# Patient Record
Sex: MALE | Race: WHITE | NOT HISPANIC OR LATINO
[De-identification: names, ages, dates, MRNs, and addresses within clinical notes are randomized per-mention and may not be internally consistent; named-entity substitution may affect disease eponyms.]

---

## 2017-01-04 ENCOUNTER — APPOINTMENT (OUTPATIENT)
Dept: CARDIOLOGY | Facility: CLINIC | Age: 82
End: 2017-01-04

## 2017-01-16 ENCOUNTER — APPOINTMENT (OUTPATIENT)
Dept: CARDIOLOGY | Facility: CLINIC | Age: 82
End: 2017-01-16

## 2017-02-05 ENCOUNTER — TRANSCRIPTION ENCOUNTER (OUTPATIENT)
Age: 82
End: 2017-02-05

## 2017-02-06 ENCOUNTER — APPOINTMENT (OUTPATIENT)
Dept: CARDIOLOGY | Facility: CLINIC | Age: 82
End: 2017-02-06

## 2017-02-07 ENCOUNTER — APPOINTMENT (OUTPATIENT)
Dept: CARDIOLOGY | Facility: CLINIC | Age: 82
End: 2017-02-07

## 2017-03-07 ENCOUNTER — RX RENEWAL (OUTPATIENT)
Age: 82
End: 2017-03-07

## 2017-03-13 ENCOUNTER — APPOINTMENT (OUTPATIENT)
Dept: CARDIOLOGY | Facility: CLINIC | Age: 82
End: 2017-03-13

## 2017-03-20 ENCOUNTER — NON-APPOINTMENT (OUTPATIENT)
Age: 82
End: 2017-03-20

## 2017-03-20 ENCOUNTER — APPOINTMENT (OUTPATIENT)
Dept: CARDIOLOGY | Facility: CLINIC | Age: 82
End: 2017-03-20

## 2017-03-20 VITALS
SYSTOLIC BLOOD PRESSURE: 122 MMHG | HEIGHT: 68 IN | HEART RATE: 65 BPM | OXYGEN SATURATION: 98 % | WEIGHT: 165 LBS | BODY MASS INDEX: 25.01 KG/M2 | DIASTOLIC BLOOD PRESSURE: 76 MMHG | RESPIRATION RATE: 17 BRPM

## 2017-03-20 DIAGNOSIS — I49.3 VENTRICULAR PREMATURE DEPOLARIZATION: ICD-10-CM

## 2017-03-22 ENCOUNTER — APPOINTMENT (OUTPATIENT)
Dept: ELECTROPHYSIOLOGY | Facility: CLINIC | Age: 82
End: 2017-03-22

## 2017-03-22 VITALS
SYSTOLIC BLOOD PRESSURE: 121 MMHG | BODY MASS INDEX: 25.01 KG/M2 | WEIGHT: 165 LBS | HEIGHT: 68 IN | DIASTOLIC BLOOD PRESSURE: 68 MMHG

## 2017-03-27 ENCOUNTER — TRANSCRIPTION ENCOUNTER (OUTPATIENT)
Age: 82
End: 2017-03-27

## 2017-03-27 ENCOUNTER — OUTPATIENT (OUTPATIENT)
Dept: INPATIENT UNIT | Facility: HOSPITAL | Age: 82
LOS: 1 days | Discharge: HOME | End: 2017-03-27
Payer: MEDICARE

## 2017-03-27 VITALS
SYSTOLIC BLOOD PRESSURE: 147 MMHG | HEIGHT: 67 IN | RESPIRATION RATE: 16 BRPM | WEIGHT: 162.04 LBS | TEMPERATURE: 98 F | OXYGEN SATURATION: 96 % | HEART RATE: 67 BPM | DIASTOLIC BLOOD PRESSURE: 74 MMHG

## 2017-03-27 VITALS
HEART RATE: 60 BPM | DIASTOLIC BLOOD PRESSURE: 73 MMHG | RESPIRATION RATE: 17 BRPM | SYSTOLIC BLOOD PRESSURE: 123 MMHG | OXYGEN SATURATION: 96 %

## 2017-03-27 DIAGNOSIS — I44.2 ATRIOVENTRICULAR BLOCK, COMPLETE: ICD-10-CM

## 2017-03-27 DIAGNOSIS — Z45.018 ENCOUNTER FOR ADJUSTMENT AND MANAGEMENT OF OTHER PART OF CARDIAC PACEMAKER: ICD-10-CM

## 2017-03-27 DIAGNOSIS — Z98.890 OTHER SPECIFIED POSTPROCEDURAL STATES: Chronic | ICD-10-CM

## 2017-03-27 DIAGNOSIS — Z95.0 PRESENCE OF CARDIAC PACEMAKER: Chronic | ICD-10-CM

## 2017-03-27 LAB
ALBUMIN SERPL ELPH-MCNC: 4.2 G/DL — SIGNIFICANT CHANGE UP (ref 3.3–5)
ALP SERPL-CCNC: 58 U/L — SIGNIFICANT CHANGE UP (ref 40–120)
ALT FLD-CCNC: 27 U/L RC — SIGNIFICANT CHANGE UP (ref 10–45)
ANION GAP SERPL CALC-SCNC: 13 MMOL/L — SIGNIFICANT CHANGE UP (ref 5–17)
AST SERPL-CCNC: 25 U/L — SIGNIFICANT CHANGE UP (ref 10–40)
BILIRUB SERPL-MCNC: 0.7 MG/DL — SIGNIFICANT CHANGE UP (ref 0.2–1.2)
BUN SERPL-MCNC: 19 MG/DL — SIGNIFICANT CHANGE UP (ref 7–23)
CALCIUM SERPL-MCNC: 9.1 MG/DL — SIGNIFICANT CHANGE UP (ref 8.4–10.5)
CHLORIDE SERPL-SCNC: 103 MMOL/L — SIGNIFICANT CHANGE UP (ref 96–108)
CO2 SERPL-SCNC: 26 MMOL/L — SIGNIFICANT CHANGE UP (ref 22–31)
CREAT SERPL-MCNC: 1.08 MG/DL — SIGNIFICANT CHANGE UP (ref 0.5–1.3)
GLUCOSE SERPL-MCNC: 110 MG/DL — HIGH (ref 70–99)
HCT VFR BLD CALC: 45.7 % — SIGNIFICANT CHANGE UP (ref 39–50)
HGB BLD-MCNC: 15.7 G/DL — SIGNIFICANT CHANGE UP (ref 13–17)
MCHC RBC-ENTMCNC: 32.3 PG — SIGNIFICANT CHANGE UP (ref 27–34)
MCHC RBC-ENTMCNC: 34.4 GM/DL — SIGNIFICANT CHANGE UP (ref 32–36)
MCV RBC AUTO: 93.8 FL — SIGNIFICANT CHANGE UP (ref 80–100)
PLATELET # BLD AUTO: 113 K/UL — LOW (ref 150–400)
POTASSIUM SERPL-MCNC: 4.6 MMOL/L — SIGNIFICANT CHANGE UP (ref 3.5–5.3)
POTASSIUM SERPL-SCNC: 4.6 MMOL/L — SIGNIFICANT CHANGE UP (ref 3.5–5.3)
PROT SERPL-MCNC: 6.7 G/DL — SIGNIFICANT CHANGE UP (ref 6–8.3)
RBC # BLD: 4.88 M/UL — SIGNIFICANT CHANGE UP (ref 4.2–5.8)
RBC # FLD: 12.1 % — SIGNIFICANT CHANGE UP (ref 10.3–14.5)
SODIUM SERPL-SCNC: 142 MMOL/L — SIGNIFICANT CHANGE UP (ref 135–145)
WBC # BLD: 7.5 K/UL — SIGNIFICANT CHANGE UP (ref 3.8–10.5)
WBC # FLD AUTO: 7.5 K/UL — SIGNIFICANT CHANGE UP (ref 3.8–10.5)

## 2017-03-27 PROCEDURE — 85027 COMPLETE CBC AUTOMATED: CPT

## 2017-03-27 PROCEDURE — 71010: CPT | Mod: 26

## 2017-03-27 PROCEDURE — 93005 ELECTROCARDIOGRAM TRACING: CPT

## 2017-03-27 PROCEDURE — 33228 REMV&REPLC PM GEN DUAL LEAD: CPT

## 2017-03-27 PROCEDURE — 93010 ELECTROCARDIOGRAM REPORT: CPT

## 2017-03-27 PROCEDURE — 80053 COMPREHEN METABOLIC PANEL: CPT

## 2017-03-27 PROCEDURE — C1785: CPT

## 2017-03-27 PROCEDURE — 71045 X-RAY EXAM CHEST 1 VIEW: CPT

## 2017-03-27 RX ORDER — ASPIRIN/CALCIUM CARB/MAGNESIUM 324 MG
81 TABLET ORAL DAILY
Qty: 0 | Refills: 0 | Status: DISCONTINUED | OUTPATIENT
Start: 2017-03-27 | End: 2017-03-27

## 2017-03-27 RX ORDER — CEFAZOLIN SODIUM 1 G
1000 VIAL (EA) INJECTION ONCE
Qty: 0 | Refills: 0 | Status: DISCONTINUED | OUTPATIENT
Start: 2017-03-27 | End: 2017-03-27

## 2017-03-27 RX ORDER — CEPHALEXIN 500 MG
250 CAPSULE ORAL THREE TIMES A DAY
Qty: 0 | Refills: 0 | Status: DISCONTINUED | OUTPATIENT
Start: 2017-03-28 | End: 2017-03-27

## 2017-03-27 RX ORDER — CEFAZOLIN SODIUM 1 G
2000 VIAL (EA) INJECTION ONCE
Qty: 0 | Refills: 0 | Status: COMPLETED | OUTPATIENT
Start: 2017-03-27 | End: 2017-03-27

## 2017-03-27 RX ORDER — LISINOPRIL 2.5 MG/1
20 TABLET ORAL DAILY
Qty: 0 | Refills: 0 | Status: DISCONTINUED | OUTPATIENT
Start: 2017-03-27 | End: 2017-03-27

## 2017-03-27 RX ORDER — SODIUM CHLORIDE 9 MG/ML
1000 INJECTION, SOLUTION INTRAVENOUS
Qty: 0 | Refills: 0 | Status: DISCONTINUED | OUTPATIENT
Start: 2017-03-27 | End: 2017-03-27

## 2017-03-27 RX ORDER — METOPROLOL TARTRATE 50 MG
25 TABLET ORAL DAILY
Qty: 0 | Refills: 0 | Status: DISCONTINUED | OUTPATIENT
Start: 2017-03-27 | End: 2017-03-27

## 2017-03-27 RX ORDER — INSULIN LISPRO 100/ML
VIAL (ML) SUBCUTANEOUS
Qty: 0 | Refills: 0 | Status: DISCONTINUED | OUTPATIENT
Start: 2017-03-27 | End: 2017-03-27

## 2017-03-27 RX ORDER — SIMVASTATIN 20 MG/1
40 TABLET, FILM COATED ORAL AT BEDTIME
Qty: 0 | Refills: 0 | Status: DISCONTINUED | OUTPATIENT
Start: 2017-03-27 | End: 2017-03-27

## 2017-03-27 RX ORDER — GLUCAGON INJECTION, SOLUTION 0.5 MG/.1ML
1 INJECTION, SOLUTION SUBCUTANEOUS ONCE
Qty: 0 | Refills: 0 | Status: DISCONTINUED | OUTPATIENT
Start: 2017-03-27 | End: 2017-03-27

## 2017-03-27 RX ORDER — INSULIN LISPRO 100/ML
VIAL (ML) SUBCUTANEOUS AT BEDTIME
Qty: 0 | Refills: 0 | Status: DISCONTINUED | OUTPATIENT
Start: 2017-03-27 | End: 2017-03-27

## 2017-03-27 RX ORDER — DEXTROSE 50 % IN WATER 50 %
25 SYRINGE (ML) INTRAVENOUS ONCE
Qty: 0 | Refills: 0 | Status: DISCONTINUED | OUTPATIENT
Start: 2017-03-27 | End: 2017-03-27

## 2017-03-27 RX ORDER — LEVOTHYROXINE SODIUM 125 MCG
50 TABLET ORAL DAILY
Qty: 0 | Refills: 0 | Status: DISCONTINUED | OUTPATIENT
Start: 2017-03-27 | End: 2017-03-27

## 2017-03-27 RX ORDER — DEXTROSE 50 % IN WATER 50 %
1 SYRINGE (ML) INTRAVENOUS ONCE
Qty: 0 | Refills: 0 | Status: DISCONTINUED | OUTPATIENT
Start: 2017-03-27 | End: 2017-03-27

## 2017-03-27 RX ORDER — DEXTROSE 50 % IN WATER 50 %
12.5 SYRINGE (ML) INTRAVENOUS ONCE
Qty: 0 | Refills: 0 | Status: DISCONTINUED | OUTPATIENT
Start: 2017-03-27 | End: 2017-03-27

## 2017-03-27 RX ADMIN — SIMVASTATIN 40 MILLIGRAM(S): 20 TABLET, FILM COATED ORAL at 21:53

## 2017-03-27 RX ADMIN — Medication 100 MILLIGRAM(S): at 21:08

## 2017-03-27 NOTE — DISCHARGE NOTE ADULT - MEDICATION SUMMARY - MEDICATIONS TO TAKE
I will START or STAY ON the medications listed below when I get home from the hospital:    Ecotrin Adult Low Strength 81 mg oral delayed release tablet  -- 1 tab(s) by mouth once a day  -- Indication: For cad    ramipril 5 mg oral capsule  -- 1 cap(s) by mouth once a day  -- Indication: For High blood pressure    metFORMIN 500 mg oral tablet, extended release  -- 1 tab(s) by mouth once a day  -- Indication: For Diabetes    simvastatin 40 mg oral tablet  -- 1 tab(s) by mouth once a day (at bedtime)  -- Indication: For High cholesterol    metoprolol succinate 25 mg oral tablet, extended release  -- 1 tab(s) by mouth once a day  -- Indication: For High blood pressure    cephalexin 250 mg oral tablet  -- 1 tab(s) by mouth every 8 hours  -- Finish all this medication unless otherwise directed by prescriber.    -- Indication: For chest wall wound_ s/p PPM generator change    Synthroid 50 mcg (0.05 mg) oral tablet  -- 1 tab(s) by mouth once a day  -- Indication: For Hypothyroid

## 2017-03-27 NOTE — DISCHARGE NOTE ADULT - CARE PLAN
Principal Discharge DX:	Artificial pacemaker  Goal:	s/p PPM generator change  Instructions for follow-up, activity and diet:	- please follow all discharge instruction as given by EP nurse Practitioner  please cont antibiotics as prescribed- finish all doses  - please follow up for WOUND CHECK assessment with MD  Secondary Diagnosis:	DM (diabetes mellitus)  Goal:	Your hemoglobin A1C will be at a normal range (normal range is from 6-8)  Instructions for follow-up, activity and diet:	Continue to follow with your primary care MD or your endocrinologist. Discuss what the goal hemoglobin A1C level is for you.  Follow a heart healthy diabetic diet. If you check your fingerstick glucose at home, call your MD if it is greater than 250mg/dL on 2 occasions or less than 100mg/dL on 2 occasions. Know signs of low blood sugar, such as: dizziness, shakiness, sweating, confusion, hunger, nervousness- drink 4 ounces apple juice if occurs and call your doctor. Know early signs of high blood sugar, such as: frequent urination, increased thirst, blurry vision, fatigue, headache - call your doctor if this occurs.  Secondary Diagnosis:	HLD (hyperlipidemia)  Goal:	Your LDL cholesterol will be less than 70mg/dL  Instructions for follow-up, activity and diet:	Continue with your cholesterol medications. Eat a heart healthy diet that is low in saturated fats and salt, and includes whole grains, fruits, vegetables and lean protein; exercise regularly (consult with your physician or cardiologist first); maintain a heart healthy weight; if you smoke - quit (A resource to help you stop smoking is the [facility] – phone number [phone].). Continue to follow with your primary physician or cardiologist.  Secondary Diagnosis:	HTN (hypertension)  Goal:	Your blood pressure will be controlled.  Instructions for follow-up, activity and diet:	Continue with your blood pressure medications; eat a heart healthy diet with low salt diet; exercise regularly (consult with your physician or cardiologist first); maintain a heart healthy weight; if you smoke - quit (A resource to help you stop smoking is the [facility] [employer] – phone number [phone].); include healthy ways to manage stress. Continue to follow with your primary care physician or cardiologist. Principal Discharge DX:	Artificial pacemaker  Goal:	s/p PPM generator change  Instructions for follow-up, activity and diet:	- please follow all discharge instruction as given by EP nurse Practitioner  please cont antibiotics as prescribed- finish all doses  - please follow up for WOUND CHECK assessment with MD  Secondary Diagnosis:	DM (diabetes mellitus)  Goal:	Your hemoglobin A1C will be at a normal range (normal range is from 6-8)  Instructions for follow-up, activity and diet:	Continue to follow with your primary care MD or your endocrinologist. Discuss what the goal hemoglobin A1C level is for you.  Follow a heart healthy diabetic diet. If you check your fingerstick glucose at home, call your MD if it is greater than 250mg/dL on 2 occasions or less than 100mg/dL on 2 occasions. Know signs of low blood sugar, such as: dizziness, shakiness, sweating, confusion, hunger, nervousness- drink 4 ounces apple juice if occurs and call your doctor. Know early signs of high blood sugar, such as: frequent urination, increased thirst, blurry vision, fatigue, headache - call your doctor if this occurs.  Secondary Diagnosis:	HLD (hyperlipidemia)  Goal:	Your LDL cholesterol will be less than 70mg/dL  Instructions for follow-up, activity and diet:	Continue with your cholesterol medications. Eat a heart healthy diet that is low in saturated fats and salt, and includes whole grains, fruits, vegetables and lean protein; exercise regularly (consult with your physician or cardiologist first); maintain a heart healthy weight; if you smoke - quit (A resource to help you stop smoking is the [facility] [employer] – phone number [phone].). Continue to follow with your primary physician or cardiologist.  Secondary Diagnosis:	HTN (hypertension)  Goal:	Your blood pressure will be controlled.  Instructions for follow-up, activity and diet:	Continue with your blood pressure medications; eat a heart healthy diet with low salt diet; exercise regularly (consult with your physician or cardiologist first); maintain a heart healthy weight; if you smoke - quit (A resource to help you stop smoking is the [facility] [employer] [facility] – phone number [phone].); include healthy ways to manage stress. Continue to follow with your primary care physician or cardiologist.

## 2017-03-27 NOTE — DISCHARGE NOTE ADULT - NS AS ACTIVITY OBS
Walking-Outdoors allowed/Walking-Indoors allowed/No Heavy lifting/straining/activity as per EP nurse practitioner please

## 2017-03-27 NOTE — DISCHARGE NOTE ADULT - CARE PROVIDER_API CALL
Zacarias Pool (MD), Cardiac Electrophysiology; Cardiology; Internal Medicine  [address] [place] [address]  Phone: [phone]  Fax: [phone]

## 2017-03-27 NOTE — DISCHARGE NOTE ADULT - PATIENT PORTAL LINK FT
“You can access the [facility] Patient Portal, offered by [facility], by registering with the following website: [email or web address]”

## 2017-03-27 NOTE — DISCHARGE NOTE ADULT - PLAN OF CARE
s/p PPM generator change - please follow all discharge instruction as given by EP nurse Practitioner  please cont antibiotics as prescribed- finish all doses  - please follow up for WOUND CHECK assessment with MD Your hemoglobin A1C will be at a normal range (normal range is from 6-8) Continue to follow with your primary care MD or your endocrinologist. Discuss what the goal hemoglobin A1C level is for you.  Follow a heart healthy diabetic diet. If you check your fingerstick glucose at home, call your MD if it is greater than 250mg/dL on 2 occasions or less than 100mg/dL on 2 occasions. Know signs of low blood sugar, such as: dizziness, shakiness, sweating, confusion, hunger, nervousness- drink 4 ounces apple juice if occurs and call your doctor. Know early signs of high blood sugar, such as: frequent urination, increased thirst, blurry vision, fatigue, headache - call your doctor if this occurs. Your LDL cholesterol will be less than 70mg/dL Continue with your cholesterol medications. Eat a heart healthy diet that is low in saturated fats and salt, and includes whole grains, fruits, vegetables and lean protein; exercise regularly (consult with your physician or cardiologist first); maintain a heart healthy weight; if you smoke - quit (A resource to help you stop smoking is the [facility] – phone number [phone].). Continue to follow with your primary physician or cardiologist. Your blood pressure will be controlled. Continue with your blood pressure medications; eat a heart healthy diet with low salt diet; exercise regularly (consult with your physician or cardiologist first); maintain a heart healthy weight; if you smoke - quit (A resource to help you stop smoking is the [facility] – phone number [phone].); include healthy ways to manage stress. Continue to follow with your primary care physician or cardiologist.

## 2017-03-27 NOTE — DISCHARGE NOTE ADULT - CARE PROVIDERS DIRECT ADDRESSES
[email or web address]

## 2017-03-27 NOTE — H&P CARDIOLOGY - HISTORY OF PRESENT ILLNESS
81 y/o male with PMHx of PPM, DM, type 2, HLD, HTN, Hypothyroidism presents for PPM generator change. Patient denies any chest pain, SOB, palpitation, dizziness/ syncope. Seen & evaluated by cardiologist & recommends for generator change.

## 2017-03-27 NOTE — DISCHARGE NOTE ADULT - ADDITIONAL INSTRUCTIONS
follow up for wound check- appointment given by EP nurse practitioner   follow up with your PCP and cards in 1-2 weeks

## 2017-03-28 RX ORDER — CEPHALEXIN 500 MG
1 CAPSULE ORAL
Qty: 9 | Refills: 0 | OUTPATIENT
Start: 2017-03-28 | End: 2017-03-31

## 2017-04-05 ENCOUNTER — NON-APPOINTMENT (OUTPATIENT)
Age: 82
End: 2017-04-05

## 2017-04-05 ENCOUNTER — APPOINTMENT (OUTPATIENT)
Dept: ELECTROPHYSIOLOGY | Facility: CLINIC | Age: 82
End: 2017-04-05

## 2017-04-05 VITALS
DIASTOLIC BLOOD PRESSURE: 76 MMHG | HEIGHT: 68 IN | SYSTOLIC BLOOD PRESSURE: 130 MMHG | OXYGEN SATURATION: 96 % | HEART RATE: 60 BPM

## 2017-05-01 ENCOUNTER — APPOINTMENT (OUTPATIENT)
Dept: CARDIOLOGY | Facility: CLINIC | Age: 82
End: 2017-05-01

## 2017-05-08 ENCOUNTER — RX RENEWAL (OUTPATIENT)
Age: 82
End: 2017-05-08

## 2017-07-24 ENCOUNTER — NON-APPOINTMENT (OUTPATIENT)
Age: 82
End: 2017-07-24

## 2017-07-24 ENCOUNTER — APPOINTMENT (OUTPATIENT)
Dept: CARDIOLOGY | Facility: CLINIC | Age: 82
End: 2017-07-24

## 2017-07-24 VITALS
HEART RATE: 55 BPM | OXYGEN SATURATION: 98 % | WEIGHT: 165 LBS | BODY MASS INDEX: 25.01 KG/M2 | RESPIRATION RATE: 16 BRPM | HEIGHT: 68 IN | SYSTOLIC BLOOD PRESSURE: 134 MMHG | DIASTOLIC BLOOD PRESSURE: 66 MMHG

## 2017-08-16 ENCOUNTER — APPOINTMENT (OUTPATIENT)
Dept: CARDIOLOGY | Facility: CLINIC | Age: 82
End: 2017-08-16
Payer: MEDICARE

## 2017-08-16 PROCEDURE — 93288 INTERROG EVL PM/LDLS PM IP: CPT

## 2017-09-05 ENCOUNTER — RX RENEWAL (OUTPATIENT)
Age: 82
End: 2017-09-05

## 2017-09-18 ENCOUNTER — NON-APPOINTMENT (OUTPATIENT)
Age: 82
End: 2017-09-18

## 2017-09-18 ENCOUNTER — APPOINTMENT (OUTPATIENT)
Dept: CARDIOLOGY | Facility: CLINIC | Age: 82
End: 2017-09-18
Payer: MEDICARE

## 2017-09-18 VITALS
HEART RATE: 57 BPM | OXYGEN SATURATION: 100 % | RESPIRATION RATE: 16 BRPM | BODY MASS INDEX: 25.7 KG/M2 | WEIGHT: 169 LBS | SYSTOLIC BLOOD PRESSURE: 116 MMHG | DIASTOLIC BLOOD PRESSURE: 84 MMHG

## 2017-09-18 DIAGNOSIS — I47.2 VENTRICULAR TACHYCARDIA: ICD-10-CM

## 2017-09-18 DIAGNOSIS — Z95.0 PRESENCE OF CARDIAC PACEMAKER: ICD-10-CM

## 2017-09-18 PROCEDURE — 99215 OFFICE O/P EST HI 40 MIN: CPT

## 2017-09-18 PROCEDURE — 93000 ELECTROCARDIOGRAM COMPLETE: CPT

## 2017-09-18 RX ORDER — METFORMIN ER 500 MG 500 MG/1
500 TABLET ORAL
Qty: 90 | Refills: 0 | Status: ACTIVE | COMMUNITY
Start: 2017-01-09

## 2017-09-25 ENCOUNTER — RESULT REVIEW (OUTPATIENT)
Age: 82
End: 2017-09-25

## 2017-09-28 ENCOUNTER — OUTPATIENT (OUTPATIENT)
Dept: OUTPATIENT SERVICES | Facility: HOSPITAL | Age: 82
LOS: 1 days | End: 2017-09-28
Payer: MEDICARE

## 2017-09-28 DIAGNOSIS — M54.16 RADICULOPATHY, LUMBAR REGION: ICD-10-CM

## 2017-09-28 DIAGNOSIS — Z95.0 PRESENCE OF CARDIAC PACEMAKER: Chronic | ICD-10-CM

## 2017-09-28 DIAGNOSIS — Z98.890 OTHER SPECIFIED POSTPROCEDURAL STATES: Chronic | ICD-10-CM

## 2017-09-28 PROCEDURE — 77003 FLUOROGUIDE FOR SPINE INJECT: CPT

## 2017-09-28 PROCEDURE — 62323 NJX INTERLAMINAR LMBR/SAC: CPT

## 2017-10-24 ENCOUNTER — OUTPATIENT (OUTPATIENT)
Dept: OUTPATIENT SERVICES | Facility: HOSPITAL | Age: 82
LOS: 1 days | End: 2017-10-24
Payer: MEDICARE

## 2017-10-24 DIAGNOSIS — Z95.0 PRESENCE OF CARDIAC PACEMAKER: Chronic | ICD-10-CM

## 2017-10-24 DIAGNOSIS — M54.16 RADICULOPATHY, LUMBAR REGION: ICD-10-CM

## 2017-10-24 DIAGNOSIS — Z98.890 OTHER SPECIFIED POSTPROCEDURAL STATES: Chronic | ICD-10-CM

## 2017-10-24 PROCEDURE — 77003 FLUOROGUIDE FOR SPINE INJECT: CPT

## 2017-10-24 PROCEDURE — 64483 NJX AA&/STRD TFRM EPI L/S 1: CPT | Mod: LT

## 2017-11-20 ENCOUNTER — APPOINTMENT (OUTPATIENT)
Dept: CARDIOLOGY | Facility: CLINIC | Age: 82
End: 2017-11-20
Payer: MEDICARE

## 2017-11-20 PROCEDURE — 93293 PM PHONE R-STRIP DEVICE EVAL: CPT

## 2017-11-21 ENCOUNTER — OUTPATIENT (OUTPATIENT)
Dept: OUTPATIENT SERVICES | Facility: HOSPITAL | Age: 82
LOS: 1 days | End: 2017-11-21
Payer: MEDICARE

## 2017-11-21 DIAGNOSIS — Z98.890 OTHER SPECIFIED POSTPROCEDURAL STATES: Chronic | ICD-10-CM

## 2017-11-21 DIAGNOSIS — M54.16 RADICULOPATHY, LUMBAR REGION: ICD-10-CM

## 2017-11-21 DIAGNOSIS — Z95.0 PRESENCE OF CARDIAC PACEMAKER: Chronic | ICD-10-CM

## 2017-11-21 LAB — GLUCOSE BLDC GLUCOMTR-MCNC: 112 MG/DL — HIGH (ref 70–99)

## 2017-11-21 PROCEDURE — 64484 NJX AA&/STRD TFRM EPI L/S EA: CPT | Mod: LT

## 2017-11-21 PROCEDURE — 82962 GLUCOSE BLOOD TEST: CPT

## 2017-11-21 PROCEDURE — 64483 NJX AA&/STRD TFRM EPI L/S 1: CPT | Mod: LT

## 2017-11-21 PROCEDURE — 77003 FLUOROGUIDE FOR SPINE INJECT: CPT

## 2018-01-22 ENCOUNTER — APPOINTMENT (OUTPATIENT)
Dept: CARDIOLOGY | Facility: CLINIC | Age: 83
End: 2018-01-22
Payer: MEDICARE

## 2018-01-22 ENCOUNTER — NON-APPOINTMENT (OUTPATIENT)
Age: 83
End: 2018-01-22

## 2018-01-22 VITALS — DIASTOLIC BLOOD PRESSURE: 74 MMHG | SYSTOLIC BLOOD PRESSURE: 130 MMHG

## 2018-01-22 VITALS
DIASTOLIC BLOOD PRESSURE: 71 MMHG | SYSTOLIC BLOOD PRESSURE: 151 MMHG | OXYGEN SATURATION: 98 % | WEIGHT: 164 LBS | HEIGHT: 68 IN | BODY MASS INDEX: 24.86 KG/M2 | RESPIRATION RATE: 16 BRPM | HEART RATE: 60 BPM

## 2018-01-22 DIAGNOSIS — Z87.891 PERSONAL HISTORY OF NICOTINE DEPENDENCE: ICD-10-CM

## 2018-01-22 PROCEDURE — 93000 ELECTROCARDIOGRAM COMPLETE: CPT

## 2018-01-22 PROCEDURE — 99214 OFFICE O/P EST MOD 30 MIN: CPT

## 2018-01-22 PROCEDURE — 93306 TTE W/DOPPLER COMPLETE: CPT

## 2018-02-13 ENCOUNTER — APPOINTMENT (OUTPATIENT)
Dept: ORTHOPEDIC SURGERY | Facility: CLINIC | Age: 83
End: 2018-02-13
Payer: MEDICARE

## 2018-02-13 VITALS
DIASTOLIC BLOOD PRESSURE: 84 MMHG | SYSTOLIC BLOOD PRESSURE: 158 MMHG | HEIGHT: 68 IN | WEIGHT: 162 LBS | HEART RATE: 59 BPM | BODY MASS INDEX: 24.55 KG/M2

## 2018-02-13 DIAGNOSIS — M51.26 OTHER INTERVERTEBRAL DISC DISPLACEMENT, LUMBAR REGION: ICD-10-CM

## 2018-02-13 DIAGNOSIS — Z87.442 PERSONAL HISTORY OF URINARY CALCULI: ICD-10-CM

## 2018-02-13 PROCEDURE — 99204 OFFICE O/P NEW MOD 45 MIN: CPT

## 2018-02-13 RX ORDER — LUTEIN 6 MG
TABLET ORAL
Refills: 0 | Status: ACTIVE | COMMUNITY

## 2018-02-13 RX ORDER — AMOXICILLIN 500 MG/1
500 CAPSULE ORAL
Qty: 21 | Refills: 0 | Status: DISCONTINUED | COMMUNITY
Start: 2017-08-17

## 2018-02-13 RX ORDER — MELOXICAM 15 MG/1
15 TABLET ORAL
Qty: 30 | Refills: 0 | Status: DISCONTINUED | COMMUNITY
Start: 2017-09-14 | End: 2018-02-13

## 2018-02-13 RX ORDER — AMOXICILLIN 875 MG/1
875 TABLET, FILM COATED ORAL
Qty: 10 | Refills: 0 | Status: DISCONTINUED | COMMUNITY
Start: 2017-09-15

## 2018-02-13 RX ORDER — LEVOTHYROXINE SODIUM 0.07 MG/1
75 TABLET ORAL
Qty: 90 | Refills: 0 | Status: ACTIVE | COMMUNITY
Start: 2017-12-27

## 2018-02-13 RX ORDER — AZITHROMYCIN 250 MG/1
250 TABLET, FILM COATED ORAL
Qty: 6 | Refills: 0 | Status: DISCONTINUED | COMMUNITY
Start: 2018-01-23

## 2018-02-13 RX ORDER — MELOXICAM 15 MG/1
15 TABLET ORAL
Refills: 0 | Status: DISCONTINUED | COMMUNITY
End: 2018-02-13

## 2018-02-13 RX ORDER — FLUTICASONE PROPIONATE 50 UG/1
50 SPRAY, METERED NASAL
Qty: 16 | Refills: 0 | Status: DISCONTINUED | COMMUNITY
Start: 2018-01-19

## 2018-02-25 ENCOUNTER — FORM ENCOUNTER (OUTPATIENT)
Age: 83
End: 2018-02-25

## 2018-02-26 ENCOUNTER — APPOINTMENT (OUTPATIENT)
Dept: RADIOLOGY | Facility: HOSPITAL | Age: 83
End: 2018-02-26
Payer: MEDICARE

## 2018-02-26 ENCOUNTER — OUTPATIENT (OUTPATIENT)
Dept: OUTPATIENT SERVICES | Facility: HOSPITAL | Age: 83
LOS: 1 days | End: 2018-02-26
Payer: MEDICARE

## 2018-02-26 DIAGNOSIS — M54.16 RADICULOPATHY, LUMBAR REGION: ICD-10-CM

## 2018-02-26 DIAGNOSIS — Z95.0 PRESENCE OF CARDIAC PACEMAKER: Chronic | ICD-10-CM

## 2018-02-26 DIAGNOSIS — M48.061 SPINAL STENOSIS, LUMBAR REGION WITHOUT NEUROGENIC CLAUDICATION: ICD-10-CM

## 2018-02-26 DIAGNOSIS — M51.26 OTHER INTERVERTEBRAL DISC DISPLACEMENT, LUMBAR REGION: ICD-10-CM

## 2018-02-26 DIAGNOSIS — Z00.00 ENCOUNTER FOR GENERAL ADULT MEDICAL EXAMINATION WITHOUT ABNORMAL FINDINGS: ICD-10-CM

## 2018-02-26 DIAGNOSIS — Z98.890 OTHER SPECIFIED POSTPROCEDURAL STATES: Chronic | ICD-10-CM

## 2018-02-26 PROCEDURE — 72132 CT LUMBAR SPINE W/DYE: CPT

## 2018-02-26 PROCEDURE — 62304 MYELOGRAPHY LUMBAR INJECTION: CPT

## 2018-02-26 PROCEDURE — 72132 CT LUMBAR SPINE W/DYE: CPT | Mod: 26

## 2018-02-28 ENCOUNTER — APPOINTMENT (OUTPATIENT)
Dept: CARDIOLOGY | Facility: CLINIC | Age: 83
End: 2018-02-28
Payer: MEDICARE

## 2018-02-28 PROCEDURE — 93288 INTERROG EVL PM/LDLS PM IP: CPT

## 2018-03-01 ENCOUNTER — APPOINTMENT (OUTPATIENT)
Dept: ORTHOPEDIC SURGERY | Facility: CLINIC | Age: 83
End: 2018-03-01
Payer: MEDICARE

## 2018-03-01 VITALS — WEIGHT: 162 LBS | HEIGHT: 68 IN | BODY MASS INDEX: 24.55 KG/M2

## 2018-03-01 PROCEDURE — 99213 OFFICE O/P EST LOW 20 MIN: CPT

## 2018-03-07 ENCOUNTER — OUTPATIENT (OUTPATIENT)
Dept: OUTPATIENT SERVICES | Facility: HOSPITAL | Age: 83
LOS: 1 days | End: 2018-03-07
Payer: MEDICARE

## 2018-03-07 VITALS
TEMPERATURE: 98 F | RESPIRATION RATE: 16 BRPM | OXYGEN SATURATION: 96 % | DIASTOLIC BLOOD PRESSURE: 81 MMHG | WEIGHT: 164.02 LBS | HEART RATE: 60 BPM | SYSTOLIC BLOOD PRESSURE: 126 MMHG | HEIGHT: 68 IN

## 2018-03-07 DIAGNOSIS — E11.9 TYPE 2 DIABETES MELLITUS WITHOUT COMPLICATIONS: ICD-10-CM

## 2018-03-07 DIAGNOSIS — M51.26 OTHER INTERVERTEBRAL DISC DISPLACEMENT, LUMBAR REGION: ICD-10-CM

## 2018-03-07 DIAGNOSIS — M54.16 RADICULOPATHY, LUMBAR REGION: ICD-10-CM

## 2018-03-07 DIAGNOSIS — I35.0 NONRHEUMATIC AORTIC (VALVE) STENOSIS: ICD-10-CM

## 2018-03-07 DIAGNOSIS — M48.061 SPINAL STENOSIS, LUMBAR REGION WITHOUT NEUROGENIC CLAUDICATION: ICD-10-CM

## 2018-03-07 DIAGNOSIS — Z95.0 PRESENCE OF CARDIAC PACEMAKER: Chronic | ICD-10-CM

## 2018-03-07 DIAGNOSIS — Z95.0 PRESENCE OF CARDIAC PACEMAKER: ICD-10-CM

## 2018-03-07 DIAGNOSIS — Z01.818 ENCOUNTER FOR OTHER PREPROCEDURAL EXAMINATION: ICD-10-CM

## 2018-03-07 DIAGNOSIS — Z98.890 OTHER SPECIFIED POSTPROCEDURAL STATES: Chronic | ICD-10-CM

## 2018-03-07 LAB
ANION GAP SERPL CALC-SCNC: 8 MMOL/L — SIGNIFICANT CHANGE UP (ref 5–17)
BUN SERPL-MCNC: 24 MG/DL — HIGH (ref 7–23)
CALCIUM SERPL-MCNC: 9.3 MG/DL — SIGNIFICANT CHANGE UP (ref 8.4–10.5)
CHLORIDE SERPL-SCNC: 103 MMOL/L — SIGNIFICANT CHANGE UP (ref 96–108)
CO2 SERPL-SCNC: 30 MMOL/L — SIGNIFICANT CHANGE UP (ref 22–31)
CREAT SERPL-MCNC: 1.12 MG/DL — SIGNIFICANT CHANGE UP (ref 0.5–1.3)
GLUCOSE SERPL-MCNC: 102 MG/DL — HIGH (ref 70–99)
HBA1C BLD-MCNC: 6.6 % — HIGH (ref 4–5.6)
HCT VFR BLD CALC: 45 % — SIGNIFICANT CHANGE UP (ref 39–50)
HGB BLD-MCNC: 15.5 G/DL — SIGNIFICANT CHANGE UP (ref 13–17)
MCHC RBC-ENTMCNC: 31.8 PG — SIGNIFICANT CHANGE UP (ref 27–34)
MCHC RBC-ENTMCNC: 34.4 GM/DL — SIGNIFICANT CHANGE UP (ref 32–36)
MCV RBC AUTO: 92.4 FL — SIGNIFICANT CHANGE UP (ref 80–100)
MRSA PCR RESULT.: SIGNIFICANT CHANGE UP
PLATELET # BLD AUTO: 126 K/UL — LOW (ref 150–400)
POTASSIUM SERPL-MCNC: 4 MMOL/L — SIGNIFICANT CHANGE UP (ref 3.5–5.3)
POTASSIUM SERPL-SCNC: 4 MMOL/L — SIGNIFICANT CHANGE UP (ref 3.5–5.3)
RBC # BLD: 4.87 M/UL — SIGNIFICANT CHANGE UP (ref 4.2–5.8)
RBC # FLD: 13.4 % — SIGNIFICANT CHANGE UP (ref 10.3–14.5)
S AUREUS DNA NOSE QL NAA+PROBE: SIGNIFICANT CHANGE UP
SODIUM SERPL-SCNC: 141 MMOL/L — SIGNIFICANT CHANGE UP (ref 135–145)
WBC # BLD: 5.07 K/UL — SIGNIFICANT CHANGE UP (ref 3.8–10.5)
WBC # FLD AUTO: 5.07 K/UL — SIGNIFICANT CHANGE UP (ref 3.8–10.5)

## 2018-03-07 PROCEDURE — 83036 HEMOGLOBIN GLYCOSYLATED A1C: CPT

## 2018-03-07 PROCEDURE — 85027 COMPLETE CBC AUTOMATED: CPT

## 2018-03-07 PROCEDURE — G0463: CPT

## 2018-03-07 PROCEDURE — 80048 BASIC METABOLIC PNL TOTAL CA: CPT

## 2018-03-07 PROCEDURE — 87640 STAPH A DNA AMP PROBE: CPT

## 2018-03-07 PROCEDURE — 87641 MR-STAPH DNA AMP PROBE: CPT

## 2018-03-07 RX ORDER — LEVOTHYROXINE SODIUM 125 MCG
1 TABLET ORAL
Qty: 0 | Refills: 0 | COMMUNITY

## 2018-03-07 RX ORDER — RAMIPRIL 5 MG
1 CAPSULE ORAL
Qty: 0 | Refills: 0 | COMMUNITY

## 2018-03-07 RX ORDER — CEFAZOLIN SODIUM 1 G
2000 VIAL (EA) INJECTION ONCE
Qty: 0 | Refills: 0 | Status: DISCONTINUED | OUTPATIENT
Start: 2018-03-13 | End: 2018-03-16

## 2018-03-07 NOTE — H&P PST ADULT - PROBLEM SELECTOR PLAN 2
recent EKG, Echo, cardiac note on file  Continue cardiac medications  Asprin patient stopped last dose 3/4/18, called spoke to Dr. Cuba's office: Hold ASA if Ok with cardio. ( as per Fatuma at Dr. Cuba's office). recent EKG, Echo, cardiac note on file  Continue cardiac medications  Asprin patient already stopped last dose 3/4/18, called spoke to Dr. Cuba's office: Hold ASA if Ok with cardio. ( as per Fatuma at Dr. Cuba's office). called left message with Cardio office.

## 2018-03-07 NOTE — H&P PST ADULT - PMH
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

## 2018-03-07 NOTE — H&P PST ADULT - HISTORY OF PRESENT ILLNESS
81 y/o male with PMHx of PPM, DM, type 2, HLD, HTN, Hypothyroidism presents for PPM generator change. Patient denies any chest pain, SOB, palpitation, dizziness/ syncope. Seen & evaluated by cardiologist & recommends for generator change. 82 y/o male with PMH  of PPM ([employer]tr[employer]), DM type2, HTN, Hypothyroidism with complaints of lower back pain x 1 year/ found to have herniated disc planned for L3-S1 Posterior lumbar laminectomies, L5-S1 lumbar discectomy. 82 y/o male with PMH of CAD, PPM ([employer]onic), AS, DM type2, HTN, Hypothyroidism, with complaints of lower back pain x 1 year/ found to have herniated disc planned for L3-S1 Posterior lumbar laminectomies, L5-S1 lumbar discectomy.

## 2018-03-07 NOTE — H&P PST ADULT - ATTENDING COMMENTS
I have discussed at length with the patient the indications for the planned surgery of laminectomy/decompression for spinal stenosis, with possible discectomies, the other options available, the nature of the surgery and the risks and possible complications including but not limited to death, paralysis, nerve damage, infection, failure of the surgery to relieve symptoms, possibility of worsening sx after surgery, possible need for further surgery in the future such as spinal fusion,  adjacent level deterioration, spinal fluid leakage, pulmonary/cardiac problems, blood clots, urinary tract infection, organ damage/failure, excessive bleeding requiring transfusions and related complications, etc, etc. All questions were answered, models and diagrams were used  and the patient understands and wishes to proceed- tm

## 2018-03-07 NOTE — H&P PST ADULT - PSH
Artificial pacemaker    H/O hernia repair Artificial pacemaker  [id number] model [id number] generater change 2017  H/O hernia repair

## 2018-03-07 NOTE — H&P PST ADULT - PROBLEM SELECTOR PLAN 1
planned for L3-S1 Posterior lumbar laminectomies, L5-S1 lumbar discectomy.   PST labs send  preprocedure surgical scrub instructions discussed

## 2018-03-07 NOTE — H&P PST ADULT - NSANTHOSAYNRD_GEN_A_CORE
No. YOANNA screening performed.  STOP BANG Legend: 0-2 = LOW Risk; 3-4 = INTERMEDIATE Risk; 5-8 = HIGH Risk

## 2018-03-07 NOTE — H&P PST ADULT - CONSTITUTIONAL DETAILS
well-developed/well-nourished/well-groomed/no distress well-nourished/well-groomed/well-developed/distress due to pain

## 2018-03-08 ENCOUNTER — APPOINTMENT (OUTPATIENT)
Dept: CARDIOLOGY | Facility: CLINIC | Age: 83
End: 2018-03-08
Payer: MEDICARE

## 2018-03-08 ENCOUNTER — NON-APPOINTMENT (OUTPATIENT)
Age: 83
End: 2018-03-08

## 2018-03-08 VITALS
WEIGHT: 162 LBS | OXYGEN SATURATION: 99 % | HEART RATE: 60 BPM | RESPIRATION RATE: 15 BRPM | BODY MASS INDEX: 26.03 KG/M2 | DIASTOLIC BLOOD PRESSURE: 71 MMHG | HEIGHT: 66 IN | SYSTOLIC BLOOD PRESSURE: 151 MMHG

## 2018-03-08 DIAGNOSIS — Z95.0 PRESENCE OF CARDIAC PACEMAKER: ICD-10-CM

## 2018-03-08 DIAGNOSIS — R73.01 IMPAIRED FASTING GLUCOSE: ICD-10-CM

## 2018-03-08 DIAGNOSIS — C91.40 HAIRY CELL LEUKEMIA NOT HAVING ACHIEVED REMISSION: ICD-10-CM

## 2018-03-08 PROCEDURE — 93000 ELECTROCARDIOGRAM COMPLETE: CPT

## 2018-03-08 PROCEDURE — 99214 OFFICE O/P EST MOD 30 MIN: CPT

## 2018-03-13 ENCOUNTER — TRANSCRIPTION ENCOUNTER (OUTPATIENT)
Age: 83
End: 2018-03-13

## 2018-03-13 ENCOUNTER — APPOINTMENT (OUTPATIENT)
Dept: ORTHOPEDIC SURGERY | Facility: HOSPITAL | Age: 83
End: 2018-03-13

## 2018-03-13 ENCOUNTER — INPATIENT (INPATIENT)
Facility: HOSPITAL | Age: 83
LOS: 2 days | Discharge: HOME | DRG: 519 | End: 2018-03-16
Attending: ORTHOPAEDIC SURGERY | Admitting: ORTHOPAEDIC SURGERY
Payer: MEDICARE

## 2018-03-13 ENCOUNTER — RESULT REVIEW (OUTPATIENT)
Age: 83
End: 2018-03-13

## 2018-03-13 VITALS
TEMPERATURE: 98 F | HEIGHT: 68 IN | OXYGEN SATURATION: 99 % | RESPIRATION RATE: 20 BRPM | SYSTOLIC BLOOD PRESSURE: 130 MMHG | DIASTOLIC BLOOD PRESSURE: 80 MMHG | WEIGHT: 164.02 LBS | HEART RATE: 60 BPM

## 2018-03-13 DIAGNOSIS — M51.26 OTHER INTERVERTEBRAL DISC DISPLACEMENT, LUMBAR REGION: ICD-10-CM

## 2018-03-13 DIAGNOSIS — Z98.890 OTHER SPECIFIED POSTPROCEDURAL STATES: Chronic | ICD-10-CM

## 2018-03-13 DIAGNOSIS — Z95.0 PRESENCE OF CARDIAC PACEMAKER: Chronic | ICD-10-CM

## 2018-03-13 DIAGNOSIS — M48.061 SPINAL STENOSIS, LUMBAR REGION WITHOUT NEUROGENIC CLAUDICATION: ICD-10-CM

## 2018-03-13 DIAGNOSIS — M48.06 SPINAL STENOSIS, LUMBAR REGION: ICD-10-CM

## 2018-03-13 LAB
ANION GAP SERPL CALC-SCNC: 13 MMOL/L — SIGNIFICANT CHANGE UP (ref 5–17)
BASOPHILS # BLD AUTO: 0.1 K/UL — SIGNIFICANT CHANGE UP (ref 0–0.2)
BASOPHILS NFR BLD AUTO: 0.7 % — SIGNIFICANT CHANGE UP (ref 0–2)
BLD GP AB SCN SERPL QL: NEGATIVE — SIGNIFICANT CHANGE UP
BUN SERPL-MCNC: 20 MG/DL — SIGNIFICANT CHANGE UP (ref 7–23)
CALCIUM SERPL-MCNC: 8.4 MG/DL — SIGNIFICANT CHANGE UP (ref 8.4–10.5)
CHLORIDE SERPL-SCNC: 104 MMOL/L — SIGNIFICANT CHANGE UP (ref 96–108)
CO2 SERPL-SCNC: 24 MMOL/L — SIGNIFICANT CHANGE UP (ref 22–31)
CREAT SERPL-MCNC: 0.94 MG/DL — SIGNIFICANT CHANGE UP (ref 0.5–1.3)
EOSINOPHIL # BLD AUTO: 0 K/UL — SIGNIFICANT CHANGE UP (ref 0–0.5)
EOSINOPHIL NFR BLD AUTO: 0.1 % — SIGNIFICANT CHANGE UP (ref 0–6)
GLUCOSE BLDC GLUCOMTR-MCNC: 126 MG/DL — HIGH (ref 70–99)
GLUCOSE BLDC GLUCOMTR-MCNC: 188 MG/DL — HIGH (ref 70–99)
GLUCOSE SERPL-MCNC: 194 MG/DL — HIGH (ref 70–99)
HCT VFR BLD CALC: 42.9 % — SIGNIFICANT CHANGE UP (ref 39–50)
HGB BLD-MCNC: 14.9 G/DL — SIGNIFICANT CHANGE UP (ref 13–17)
LYMPHOCYTES # BLD AUTO: 0.5 K/UL — LOW (ref 1–3.3)
LYMPHOCYTES # BLD AUTO: 6.1 % — LOW (ref 13–44)
MCHC RBC-ENTMCNC: 32.6 PG — SIGNIFICANT CHANGE UP (ref 27–34)
MCHC RBC-ENTMCNC: 34.8 GM/DL — SIGNIFICANT CHANGE UP (ref 32–36)
MCV RBC AUTO: 93.7 FL — SIGNIFICANT CHANGE UP (ref 80–100)
MONOCYTES # BLD AUTO: 0.1 K/UL — SIGNIFICANT CHANGE UP (ref 0–0.9)
MONOCYTES NFR BLD AUTO: 1 % — LOW (ref 2–14)
NEUTROPHILS # BLD AUTO: 8 K/UL — HIGH (ref 1.8–7.4)
NEUTROPHILS NFR BLD AUTO: 92.1 % — HIGH (ref 43–77)
PLATELET # BLD AUTO: 108 K/UL — LOW (ref 150–400)
POTASSIUM SERPL-MCNC: 4.3 MMOL/L — SIGNIFICANT CHANGE UP (ref 3.5–5.3)
POTASSIUM SERPL-SCNC: 4.3 MMOL/L — SIGNIFICANT CHANGE UP (ref 3.5–5.3)
RBC # BLD: 4.58 M/UL — SIGNIFICANT CHANGE UP (ref 4.2–5.8)
RBC # FLD: 11.7 % — SIGNIFICANT CHANGE UP (ref 10.3–14.5)
RH IG SCN BLD-IMP: POSITIVE — SIGNIFICANT CHANGE UP
RH IG SCN BLD-IMP: POSITIVE — SIGNIFICANT CHANGE UP
SODIUM SERPL-SCNC: 141 MMOL/L — SIGNIFICANT CHANGE UP (ref 135–145)
WBC # BLD: 8.7 K/UL — SIGNIFICANT CHANGE UP (ref 3.8–10.5)
WBC # FLD AUTO: 8.7 K/UL — SIGNIFICANT CHANGE UP (ref 3.8–10.5)

## 2018-03-13 PROCEDURE — 20926: CPT | Mod: 59

## 2018-03-13 PROCEDURE — 88311 DECALCIFY TISSUE: CPT | Mod: 26

## 2018-03-13 PROCEDURE — 63048 LAM FACETEC &FORAMOT EA ADDL: CPT | Mod: 82

## 2018-03-13 PROCEDURE — 63047 LAM FACETEC & FORAMOT LUMBAR: CPT

## 2018-03-13 PROCEDURE — 88304 TISSUE EXAM BY PATHOLOGIST: CPT | Mod: 26

## 2018-03-13 PROCEDURE — 63047 LAM FACETEC & FORAMOT LUMBAR: CPT | Mod: 82

## 2018-03-13 PROCEDURE — 20926: CPT | Mod: 82,59

## 2018-03-13 PROCEDURE — 72020 X-RAY EXAM OF SPINE 1 VIEW: CPT | Mod: 26

## 2018-03-13 PROCEDURE — 63048 LAM FACETEC &FORAMOT EA ADDL: CPT

## 2018-03-13 RX ORDER — HYDROMORPHONE HYDROCHLORIDE 2 MG/ML
0.25 INJECTION INTRAMUSCULAR; INTRAVENOUS; SUBCUTANEOUS
Qty: 0 | Refills: 0 | Status: DISCONTINUED | OUTPATIENT
Start: 2018-03-13 | End: 2018-03-13

## 2018-03-13 RX ORDER — MAGNESIUM HYDROXIDE 400 MG/1
30 TABLET, CHEWABLE ORAL EVERY 12 HOURS
Qty: 0 | Refills: 0 | Status: DISCONTINUED | OUTPATIENT
Start: 2018-03-13 | End: 2018-03-16

## 2018-03-13 RX ORDER — OXYCODONE HYDROCHLORIDE 5 MG/1
10 TABLET ORAL EVERY 4 HOURS
Qty: 0 | Refills: 0 | Status: DISCONTINUED | OUTPATIENT
Start: 2018-03-13 | End: 2018-03-16

## 2018-03-13 RX ORDER — ONDANSETRON 8 MG/1
4 TABLET, FILM COATED ORAL EVERY 6 HOURS
Qty: 0 | Refills: 0 | Status: DISCONTINUED | OUTPATIENT
Start: 2018-03-13 | End: 2018-03-16

## 2018-03-13 RX ORDER — METOPROLOL TARTRATE 50 MG
25 TABLET ORAL DAILY
Qty: 0 | Refills: 0 | Status: DISCONTINUED | OUTPATIENT
Start: 2018-03-13 | End: 2018-03-16

## 2018-03-13 RX ORDER — INSULIN LISPRO 100/ML
VIAL (ML) SUBCUTANEOUS
Qty: 0 | Refills: 0 | Status: DISCONTINUED | OUTPATIENT
Start: 2018-03-13 | End: 2018-03-16

## 2018-03-13 RX ORDER — DEXTROSE 50 % IN WATER 50 %
12.5 SYRINGE (ML) INTRAVENOUS ONCE
Qty: 0 | Refills: 0 | Status: DISCONTINUED | OUTPATIENT
Start: 2018-03-13 | End: 2018-03-16

## 2018-03-13 RX ORDER — ONDANSETRON 8 MG/1
4 TABLET, FILM COATED ORAL ONCE
Qty: 0 | Refills: 0 | Status: COMPLETED | OUTPATIENT
Start: 2018-03-13 | End: 2018-03-13

## 2018-03-13 RX ORDER — SODIUM CHLORIDE 9 MG/ML
3 INJECTION INTRAMUSCULAR; INTRAVENOUS; SUBCUTANEOUS EVERY 8 HOURS
Qty: 0 | Refills: 0 | Status: DISCONTINUED | OUTPATIENT
Start: 2018-03-13 | End: 2018-03-13

## 2018-03-13 RX ORDER — PANTOPRAZOLE SODIUM 20 MG/1
40 TABLET, DELAYED RELEASE ORAL
Qty: 0 | Refills: 0 | Status: DISCONTINUED | OUTPATIENT
Start: 2018-03-13 | End: 2018-03-16

## 2018-03-13 RX ORDER — SIMVASTATIN 20 MG/1
40 TABLET, FILM COATED ORAL AT BEDTIME
Qty: 0 | Refills: 0 | Status: DISCONTINUED | OUTPATIENT
Start: 2018-03-13 | End: 2018-03-16

## 2018-03-13 RX ORDER — ACETAMINOPHEN 500 MG
650 TABLET ORAL EVERY 6 HOURS
Qty: 0 | Refills: 0 | Status: DISCONTINUED | OUTPATIENT
Start: 2018-03-13 | End: 2018-03-16

## 2018-03-13 RX ORDER — BENZOCAINE AND MENTHOL 5; 1 G/100ML; G/100ML
1 LIQUID ORAL
Qty: 0 | Refills: 0 | Status: DISCONTINUED | OUTPATIENT
Start: 2018-03-13 | End: 2018-03-16

## 2018-03-13 RX ORDER — OXYCODONE HYDROCHLORIDE 5 MG/1
5 TABLET ORAL EVERY 4 HOURS
Qty: 0 | Refills: 0 | Status: DISCONTINUED | OUTPATIENT
Start: 2018-03-13 | End: 2018-03-16

## 2018-03-13 RX ORDER — DEXTROSE 50 % IN WATER 50 %
25 SYRINGE (ML) INTRAVENOUS ONCE
Qty: 0 | Refills: 0 | Status: DISCONTINUED | OUTPATIENT
Start: 2018-03-13 | End: 2018-03-16

## 2018-03-13 RX ORDER — HYDROMORPHONE HYDROCHLORIDE 2 MG/ML
0.5 INJECTION INTRAMUSCULAR; INTRAVENOUS; SUBCUTANEOUS EVERY 4 HOURS
Qty: 0 | Refills: 0 | Status: DISCONTINUED | OUTPATIENT
Start: 2018-03-13 | End: 2018-03-16

## 2018-03-13 RX ORDER — SODIUM CHLORIDE 9 MG/ML
1000 INJECTION, SOLUTION INTRAVENOUS
Qty: 0 | Refills: 0 | Status: DISCONTINUED | OUTPATIENT
Start: 2018-03-13 | End: 2018-03-16

## 2018-03-13 RX ORDER — DEXTROSE 50 % IN WATER 50 %
1 SYRINGE (ML) INTRAVENOUS ONCE
Qty: 0 | Refills: 0 | Status: DISCONTINUED | OUTPATIENT
Start: 2018-03-13 | End: 2018-03-16

## 2018-03-13 RX ORDER — CEFAZOLIN SODIUM 1 G
2000 VIAL (EA) INJECTION EVERY 8 HOURS
Qty: 0 | Refills: 0 | Status: COMPLETED | OUTPATIENT
Start: 2018-03-13 | End: 2018-03-14

## 2018-03-13 RX ORDER — LEVOTHYROXINE SODIUM 125 MCG
75 TABLET ORAL DAILY
Qty: 0 | Refills: 0 | Status: DISCONTINUED | OUTPATIENT
Start: 2018-03-13 | End: 2018-03-16

## 2018-03-13 RX ORDER — SENNA PLUS 8.6 MG/1
2 TABLET ORAL AT BEDTIME
Qty: 0 | Refills: 0 | Status: DISCONTINUED | OUTPATIENT
Start: 2018-03-13 | End: 2018-03-16

## 2018-03-13 RX ORDER — GLUCAGON INJECTION, SOLUTION 0.5 MG/.1ML
1 INJECTION, SOLUTION SUBCUTANEOUS ONCE
Qty: 0 | Refills: 0 | Status: DISCONTINUED | OUTPATIENT
Start: 2018-03-13 | End: 2018-03-16

## 2018-03-13 RX ORDER — DOCUSATE SODIUM 100 MG
100 CAPSULE ORAL THREE TIMES A DAY
Qty: 0 | Refills: 0 | Status: DISCONTINUED | OUTPATIENT
Start: 2018-03-13 | End: 2018-03-16

## 2018-03-13 RX ORDER — INSULIN LISPRO 100/ML
VIAL (ML) SUBCUTANEOUS AT BEDTIME
Qty: 0 | Refills: 0 | Status: DISCONTINUED | OUTPATIENT
Start: 2018-03-13 | End: 2018-03-16

## 2018-03-13 RX ORDER — LIDOCAINE HCL 20 MG/ML
0.2 VIAL (ML) INJECTION ONCE
Qty: 0 | Refills: 0 | Status: DISCONTINUED | OUTPATIENT
Start: 2018-03-13 | End: 2018-03-13

## 2018-03-13 RX ORDER — ASPIRIN/CALCIUM CARB/MAGNESIUM 324 MG
81 TABLET ORAL DAILY
Qty: 0 | Refills: 0 | Status: DISCONTINUED | OUTPATIENT
Start: 2018-03-14 | End: 2018-03-16

## 2018-03-13 RX ADMIN — SODIUM CHLORIDE 75 MILLILITER(S): 9 INJECTION, SOLUTION INTRAVENOUS at 20:44

## 2018-03-13 RX ADMIN — SODIUM CHLORIDE 3 MILLILITER(S): 9 INJECTION INTRAMUSCULAR; INTRAVENOUS; SUBCUTANEOUS at 11:08

## 2018-03-13 RX ADMIN — ONDANSETRON 4 MILLIGRAM(S): 8 TABLET, FILM COATED ORAL at 20:15

## 2018-03-13 RX ADMIN — Medication 100 MILLIGRAM(S): at 22:46

## 2018-03-13 NOTE — BRIEF OPERATIVE NOTE - PRE-OP DX
HNP (herniated nucleus pulposus), lumbar  03/13/2018  Left L4/5  Active  French Lieberman  Spinal stenosis of lumbosacral region  03/13/2018  L3-S1  Active  French Lieberman

## 2018-03-13 NOTE — CHART NOTE - NSCHARTNOTEFT_GEN_A_CORE
Patient Resting without complaints.  Denies Chest Pain, SOB, N/V.    T(C): 36.2 (03-13-18 @ 20:15)  T(F): 97.2 (03-13-18 @ 20:15)  HR: 76 (03-13-18 @ 20:45)  BP: 122/64 (03-13-18 @ 20:45)  RR: 16 (03-13-18 @ 20:45)  SpO2: 94% (03-13-18 @ 20:45)      Exam:   Gen: NAD; Alert/Oriented    Cardio: RRR S1,S2    Pulm: CTA B/L    Lumbar: Dsg CDI  HMV intact with good suction    B/L LE: Sensation/Motor grossly intact           Calves Soft/NT;   + DF/PF   RLE strength 5/5  LLE DF/EHL 3+-4/5, PF/FHL 5/5                          14.9   8.7   )-----------( 108      ( 13 Mar 2018 19:15 )             42.9       141  |  104  |  20  ----------------------------<  194<H>  4.3   |  24  |  0.94      A/P :  83y Male s/p L3-S1 Decompression & Left L4/5 Laminectomy/Discectomy; Stable  - Pain control  - DVT ppx w/SCDs; IS     - AM labs    - PT eval- WBAT B/L LE  - Cont current tx    Edith Bangura PA-C  Orthopedic Surgery  Pagers [phone]

## 2018-03-14 LAB
ANION GAP SERPL CALC-SCNC: 12 MMOL/L — SIGNIFICANT CHANGE UP (ref 5–17)
BASOPHILS # BLD AUTO: 0 K/UL — SIGNIFICANT CHANGE UP (ref 0–0.2)
BASOPHILS NFR BLD AUTO: 0 % — SIGNIFICANT CHANGE UP (ref 0–2)
BUN SERPL-MCNC: 22 MG/DL — SIGNIFICANT CHANGE UP (ref 7–23)
CALCIUM SERPL-MCNC: 8.4 MG/DL — SIGNIFICANT CHANGE UP (ref 8.4–10.5)
CHLORIDE SERPL-SCNC: 102 MMOL/L — SIGNIFICANT CHANGE UP (ref 96–108)
CO2 SERPL-SCNC: 26 MMOL/L — SIGNIFICANT CHANGE UP (ref 22–31)
CREAT SERPL-MCNC: 1.01 MG/DL — SIGNIFICANT CHANGE UP (ref 0.5–1.3)
EOSINOPHIL # BLD AUTO: 0 K/UL — SIGNIFICANT CHANGE UP (ref 0–0.5)
EOSINOPHIL NFR BLD AUTO: 0 % — SIGNIFICANT CHANGE UP (ref 0–6)
ESTIMATED AVERAGE GLUCOSE: 140 MG/DL — HIGH (ref 68–114)
GLUCOSE BLDC GLUCOMTR-MCNC: 124 MG/DL — HIGH (ref 70–99)
GLUCOSE BLDC GLUCOMTR-MCNC: 143 MG/DL — HIGH (ref 70–99)
GLUCOSE BLDC GLUCOMTR-MCNC: 154 MG/DL — HIGH (ref 70–99)
GLUCOSE BLDC GLUCOMTR-MCNC: 165 MG/DL — HIGH (ref 70–99)
GLUCOSE SERPL-MCNC: 185 MG/DL — HIGH (ref 70–99)
HBA1C BLD-MCNC: 6.5 % — HIGH (ref 4–5.6)
HCT VFR BLD CALC: 42.3 % — SIGNIFICANT CHANGE UP (ref 39–50)
HGB BLD-MCNC: 14.1 G/DL — SIGNIFICANT CHANGE UP (ref 13–17)
IMM GRANULOCYTES NFR BLD AUTO: 0.2 % — SIGNIFICANT CHANGE UP (ref 0–1.5)
LYMPHOCYTES # BLD AUTO: 0.75 K/UL — LOW (ref 1–3.3)
LYMPHOCYTES # BLD AUTO: 7.4 % — LOW (ref 13–44)
MCHC RBC-ENTMCNC: 31.6 PG — SIGNIFICANT CHANGE UP (ref 27–34)
MCHC RBC-ENTMCNC: 33.3 GM/DL — SIGNIFICANT CHANGE UP (ref 32–36)
MCV RBC AUTO: 94.8 FL — SIGNIFICANT CHANGE UP (ref 80–100)
MONOCYTES # BLD AUTO: 0.75 K/UL — SIGNIFICANT CHANGE UP (ref 0–0.9)
MONOCYTES NFR BLD AUTO: 7.4 % — SIGNIFICANT CHANGE UP (ref 2–14)
NEUTROPHILS # BLD AUTO: 8.57 K/UL — HIGH (ref 1.8–7.4)
NEUTROPHILS NFR BLD AUTO: 85 % — HIGH (ref 43–77)
PLATELET # BLD AUTO: 121 K/UL — LOW (ref 150–400)
POTASSIUM SERPL-MCNC: 4.6 MMOL/L — SIGNIFICANT CHANGE UP (ref 3.5–5.3)
POTASSIUM SERPL-SCNC: 4.6 MMOL/L — SIGNIFICANT CHANGE UP (ref 3.5–5.3)
RBC # BLD: 4.46 M/UL — SIGNIFICANT CHANGE UP (ref 4.2–5.8)
RBC # FLD: 13.1 % — SIGNIFICANT CHANGE UP (ref 10.3–14.5)
SODIUM SERPL-SCNC: 140 MMOL/L — SIGNIFICANT CHANGE UP (ref 135–145)
WBC # BLD: 10.09 K/UL — SIGNIFICANT CHANGE UP (ref 3.8–10.5)
WBC # FLD AUTO: 10.09 K/UL — SIGNIFICANT CHANGE UP (ref 3.8–10.5)

## 2018-03-14 RX ORDER — SODIUM CHLORIDE 0.65 %
1 AEROSOL, SPRAY (ML) NASAL EVERY 4 HOURS
Qty: 0 | Refills: 0 | Status: DISCONTINUED | OUTPATIENT
Start: 2018-03-14 | End: 2018-03-16

## 2018-03-14 RX ADMIN — Medication 75 MICROGRAM(S): at 06:47

## 2018-03-14 RX ADMIN — Medication 100 MILLIGRAM(S): at 06:46

## 2018-03-14 RX ADMIN — Medication 81 MILLIGRAM(S): at 11:32

## 2018-03-14 RX ADMIN — Medication 25 MILLIGRAM(S): at 06:46

## 2018-03-14 RX ADMIN — BENZOCAINE AND MENTHOL 1 LOZENGE: 5; 1 LIQUID ORAL at 11:32

## 2018-03-14 RX ADMIN — Medication 100 MILLIGRAM(S): at 14:47

## 2018-03-14 RX ADMIN — SODIUM CHLORIDE 75 MILLILITER(S): 9 INJECTION, SOLUTION INTRAVENOUS at 06:47

## 2018-03-14 RX ADMIN — Medication 1 TABLET(S): at 11:32

## 2018-03-14 RX ADMIN — Medication 650 MILLIGRAM(S): at 15:15

## 2018-03-14 RX ADMIN — SENNA PLUS 2 TABLET(S): 8.6 TABLET ORAL at 21:38

## 2018-03-14 RX ADMIN — Medication 650 MILLIGRAM(S): at 14:47

## 2018-03-14 RX ADMIN — SIMVASTATIN 40 MILLIGRAM(S): 20 TABLET, FILM COATED ORAL at 21:38

## 2018-03-14 RX ADMIN — PANTOPRAZOLE SODIUM 40 MILLIGRAM(S): 20 TABLET, DELAYED RELEASE ORAL at 06:46

## 2018-03-14 RX ADMIN — Medication 100 MILLIGRAM(S): at 21:38

## 2018-03-14 NOTE — PHYSICAL THERAPY INITIAL EVALUATION ADULT - PLANNED THERAPY INTERVENTIONS, PT EVAL
gait training/transfer training/GOAL: Pt will negotiate 5 steps with handrail in a step-to pattern independently in 2 weeks/balance training/bed mobility training/strengthening

## 2018-03-14 NOTE — PHYSICAL THERAPY INITIAL EVALUATION ADULT - ADDITIONAL COMMENTS
Pt states he lives in a private home with spouse and daughter with 5 steps to enter (+handrail) and 5 steps to bedroom/bathroom (+handrail). Pt states prior to admission being independent with all functional mobility and ADLs.

## 2018-03-14 NOTE — PHYSICAL THERAPY INITIAL EVALUATION ADULT - PERTINENT HX OF CURRENT PROBLEM, REHAB EVAL
82 y/o male with c/o low back pain x1 year, found to have herniated disc. Pt presents s/p above procedure.

## 2018-03-14 NOTE — PROGRESS NOTE ADULT - ATTENDING COMMENTS
Attending:  patient seen wed early am    agree with above assessment-- left ankle DF weak ?3+/5    otherwise doing well    Plan as per above    Gera SONG

## 2018-03-15 ENCOUNTER — TRANSCRIPTION ENCOUNTER (OUTPATIENT)
Age: 83
End: 2018-03-15

## 2018-03-15 LAB
ANION GAP SERPL CALC-SCNC: 11 MMOL/L — SIGNIFICANT CHANGE UP (ref 5–17)
BASOPHILS # BLD AUTO: 0.01 K/UL — SIGNIFICANT CHANGE UP (ref 0–0.2)
BASOPHILS NFR BLD AUTO: 0.1 % — SIGNIFICANT CHANGE UP (ref 0–2)
BUN SERPL-MCNC: 21 MG/DL — SIGNIFICANT CHANGE UP (ref 7–23)
CALCIUM SERPL-MCNC: 8.6 MG/DL — SIGNIFICANT CHANGE UP (ref 8.4–10.5)
CHLORIDE SERPL-SCNC: 102 MMOL/L — SIGNIFICANT CHANGE UP (ref 96–108)
CO2 SERPL-SCNC: 26 MMOL/L — SIGNIFICANT CHANGE UP (ref 22–31)
CREAT SERPL-MCNC: 0.96 MG/DL — SIGNIFICANT CHANGE UP (ref 0.5–1.3)
EOSINOPHIL # BLD AUTO: 0.03 K/UL — SIGNIFICANT CHANGE UP (ref 0–0.5)
EOSINOPHIL NFR BLD AUTO: 0.3 % — SIGNIFICANT CHANGE UP (ref 0–6)
GLUCOSE BLDC GLUCOMTR-MCNC: 141 MG/DL — HIGH (ref 70–99)
GLUCOSE BLDC GLUCOMTR-MCNC: 167 MG/DL — HIGH (ref 70–99)
GLUCOSE BLDC GLUCOMTR-MCNC: 169 MG/DL — HIGH (ref 70–99)
GLUCOSE BLDC GLUCOMTR-MCNC: 184 MG/DL — HIGH (ref 70–99)
GLUCOSE SERPL-MCNC: 167 MG/DL — HIGH (ref 70–99)
HCT VFR BLD CALC: 39.9 % — SIGNIFICANT CHANGE UP (ref 39–50)
HGB BLD-MCNC: 12.6 G/DL — LOW (ref 13–17)
IMM GRANULOCYTES NFR BLD AUTO: 0.2 % — SIGNIFICANT CHANGE UP (ref 0–1.5)
LYMPHOCYTES # BLD AUTO: 1.27 K/UL — SIGNIFICANT CHANGE UP (ref 1–3.3)
LYMPHOCYTES # BLD AUTO: 13.2 % — SIGNIFICANT CHANGE UP (ref 13–44)
MCHC RBC-ENTMCNC: 29.4 PG — SIGNIFICANT CHANGE UP (ref 27–34)
MCHC RBC-ENTMCNC: 31.6 GM/DL — LOW (ref 32–36)
MCV RBC AUTO: 93 FL — SIGNIFICANT CHANGE UP (ref 80–100)
MONOCYTES # BLD AUTO: 0.94 K/UL — HIGH (ref 0–0.9)
MONOCYTES NFR BLD AUTO: 9.7 % — SIGNIFICANT CHANGE UP (ref 2–14)
NEUTROPHILS # BLD AUTO: 7.38 K/UL — SIGNIFICANT CHANGE UP (ref 1.8–7.4)
NEUTROPHILS NFR BLD AUTO: 76.5 % — SIGNIFICANT CHANGE UP (ref 43–77)
PLATELET # BLD AUTO: 108 K/UL — LOW (ref 150–400)
POTASSIUM SERPL-MCNC: 4 MMOL/L — SIGNIFICANT CHANGE UP (ref 3.5–5.3)
POTASSIUM SERPL-SCNC: 4 MMOL/L — SIGNIFICANT CHANGE UP (ref 3.5–5.3)
RBC # BLD: 4.29 M/UL — SIGNIFICANT CHANGE UP (ref 4.2–5.8)
RBC # FLD: 13.4 % — SIGNIFICANT CHANGE UP (ref 10.3–14.5)
SODIUM SERPL-SCNC: 139 MMOL/L — SIGNIFICANT CHANGE UP (ref 135–145)
WBC # BLD: 9.65 K/UL — SIGNIFICANT CHANGE UP (ref 3.8–10.5)
WBC # FLD AUTO: 9.65 K/UL — SIGNIFICANT CHANGE UP (ref 3.8–10.5)

## 2018-03-15 RX ORDER — SENNA PLUS 8.6 MG/1
2 TABLET ORAL
Qty: 0 | Refills: 0 | COMMUNITY
Start: 2018-03-15

## 2018-03-15 RX ORDER — DOCUSATE SODIUM 100 MG
1 CAPSULE ORAL
Qty: 0 | Refills: 0 | COMMUNITY
Start: 2018-03-15

## 2018-03-15 RX ORDER — ACETAMINOPHEN 500 MG
2 TABLET ORAL
Qty: 0 | Refills: 0 | COMMUNITY
Start: 2018-03-15

## 2018-03-15 RX ADMIN — Medication 100 MILLIGRAM(S): at 05:26

## 2018-03-15 RX ADMIN — Medication 650 MILLIGRAM(S): at 22:15

## 2018-03-15 RX ADMIN — Medication 75 MICROGRAM(S): at 05:26

## 2018-03-15 RX ADMIN — Medication 1 TABLET(S): at 11:24

## 2018-03-15 RX ADMIN — Medication 650 MILLIGRAM(S): at 02:21

## 2018-03-15 RX ADMIN — SIMVASTATIN 40 MILLIGRAM(S): 20 TABLET, FILM COATED ORAL at 21:42

## 2018-03-15 RX ADMIN — Medication 650 MILLIGRAM(S): at 01:51

## 2018-03-15 RX ADMIN — Medication 25 MILLIGRAM(S): at 05:26

## 2018-03-15 RX ADMIN — Medication 1 SPRAY(S): at 05:26

## 2018-03-15 RX ADMIN — Medication 81 MILLIGRAM(S): at 11:24

## 2018-03-15 RX ADMIN — PANTOPRAZOLE SODIUM 40 MILLIGRAM(S): 20 TABLET, DELAYED RELEASE ORAL at 05:26

## 2018-03-15 RX ADMIN — Medication 650 MILLIGRAM(S): at 09:11

## 2018-03-15 RX ADMIN — Medication 100 MILLIGRAM(S): at 11:24

## 2018-03-15 RX ADMIN — Medication 100 MILLIGRAM(S): at 21:42

## 2018-03-15 RX ADMIN — SENNA PLUS 2 TABLET(S): 8.6 TABLET ORAL at 21:42

## 2018-03-15 RX ADMIN — Medication 650 MILLIGRAM(S): at 08:41

## 2018-03-15 RX ADMIN — Medication 650 MILLIGRAM(S): at 21:41

## 2018-03-15 NOTE — OCCUPATIONAL THERAPY INITIAL EVALUATION ADULT - PERTINENT HX OF CURRENT PROBLEM, REHAB EVAL
82 y/o male with PMH of CAD, PPM (Medtronic), AS, DM type2, HTN, Hypothyroidism, with complaints of lower back pain x 1 year/ found to have herniated disc planned for L3-S1 Posterior lumbar laminectomies, L5-S1 lumbar discectomy.

## 2018-03-15 NOTE — PROGRESS NOTE ADULT - ATTENDING COMMENTS
Attending:  patient seen    agree with above assessment- maybe some slight improvement in partial left foot drop    Plan as per above-- discussed discharge options    Gera SONG

## 2018-03-15 NOTE — DISCHARGE NOTE ADULT - NS AS ACTIVITY OBS
Walking-Outdoors allowed/Stairs allowed/Do not drive or operate machinery/No Heavy lifting/straining/Walking-Indoors allowed/Showering allowed/stairs/shower with assistance Walking-Indoors allowed/Walking-Outdoors allowed/Stairs allowed/Do not drive or operate machinery/Showering allowed/No Heavy lifting/straining/Keep lower back wound clean and dry for first 7 days, may shower cover dressing/wound with plastic wrap

## 2018-03-15 NOTE — DISCHARGE NOTE ADULT - PLAN OF CARE
improved ADL's, pain control Please call Dr. Boss's office on discharge from the hospital to make a follow up appointment in 7-10 days.  Physical Therapy-weight bearing as tolerated.  Keep dressings clean/dry and intact. Pain relief, improvement with activities of daily living Please call Dr. Boss's office on discharge from the hospital to make a follow up appointment in 7-10 days.  Physical Therapy-weight bearing as tolerated.  Keep dressings/ wound clean and dry

## 2018-03-15 NOTE — PROVIDER CONTACT NOTE (OTHER) - RECOMMENDATIONS
Start patient on IV fluids. Patient placed back into bed and placed into Trendelenburg. /63 s/p placing pt in bed

## 2018-03-15 NOTE — DISCHARGE NOTE ADULT - CARE PLAN
Principal Discharge DX:	S/P lumbar laminectomy  Goal:	improved ADL's, pain control  Assessment and plan of treatment:	Please call Dr. Boss's office on discharge from the hospital to make a follow up appointment in 7-10 days.  Physical Therapy-weight bearing as tolerated.  Keep dressings clean/dry and intact. Principal Discharge DX:	S/P lumbar laminectomy  Goal:	Pain relief, improvement with activities of daily living  Assessment and plan of treatment:	Please call Dr. Boss's office on discharge from the hospital to make a follow up appointment in 7-10 days.  Physical Therapy-weight bearing as tolerated.  Keep dressings/ wound clean and dry

## 2018-03-15 NOTE — DISCHARGE NOTE ADULT - REASON FOR ADMISSION
chronic LBP chronic LBP-L3-S1 laminectomy with L4-L5 discectomy. Spinal stenosis with low back pain/ Lumbar Laminectomy L3-S1 with Discectomy L4-L5

## 2018-03-15 NOTE — OCCUPATIONAL THERAPY INITIAL EVALUATION ADULT - ADDITIONAL COMMENTS
X-Ray Spine 3/13/18: Posterior localizers are noted over the L4 and L5 spinous processes. There is mild disc height loss at L4-5 with endplate sclerosis better visualized on the recent CT myelogram. Small anterior spurs are noted. Aortic calcification is again noted.  Pt s/p decompression of lumbar spine, discectomy for herniated nucleus pulposus  3/13/18.

## 2018-03-15 NOTE — OCCUPATIONAL THERAPY INITIAL EVALUATION ADULT - DIAGNOSIS, OT EVAL
Pt with impaired motor control, strength, balance, pain impacting pt's ability to complete ADLs, functional mobility.

## 2018-03-15 NOTE — DISCHARGE NOTE ADULT - PATIENT PORTAL LINK FT
You can access the [facility] Patient Portal, offered by [facility], by registering with the following website: [email or web address]

## 2018-03-15 NOTE — OCCUPATIONAL THERAPY INITIAL EVALUATION ADULT - LIVES WITH, PROFILE
Pt lives with his wife and daughter in a private home, 5 steps to enter, 5 steps inside. Prior to admission, pt was independent with ADLs and functional mobility without an AD./spouse

## 2018-03-15 NOTE — DISCHARGE NOTE ADULT - MEDICATION SUMMARY - MEDICATIONS TO TAKE
I will START or STAY ON the medications listed below when I get home from the hospital:    Ecotrin Adult Low Strength 81 mg oral delayed release tablet  -- 1 tab(s) by mouth once a day  -- Indication: For Antiplatelet therapy    acetaminophen 325 mg oral tablet  -- 2 tab(s) by mouth every 6 hours, As needed, For Temp greater than 38 C (100.4 F)  -- Indication: For Temps    acetaminophen 325 mg oral tablet  -- 2 tab(s) by mouth every 6 hours, As needed, Mild Pain (1 - 3)  -- Indication: For Pain    metFORMIN 500 mg oral tablet, extended release  -- 1 tab(s) by mouth once a day  -- Indication: For Antidiabetic agent     simvastatin 40 mg oral tablet  -- 1 tab(s) by mouth once a day (at bedtime)  -- Indication: For Antihyperlipidemia agent    metoprolol succinate 25 mg oral tablet, extended release  -- 1 tab(s) by mouth once a day  -- Indication: For Antihypertensive agent    docusate sodium 100 mg oral capsule  -- 1 cap(s) by mouth 3 times a day  -- Indication: For Stool softener    senna oral tablet  -- 2 tab(s) by mouth once a day (at bedtime)  -- Indication: For Laxative    Synthroid 75 mcg (0.075 mg) oral tablet  -- 1 tab(s) by mouth once a day  -- Indication: For Thyroid hormone replacement    Multiple Vitamins oral tablet  -- 1 tab(s) by mouth once a day  -- Indication: For Supplement

## 2018-03-15 NOTE — OCCUPATIONAL THERAPY INITIAL EVALUATION ADULT - PLANNED THERAPY INTERVENTIONS, OT EVAL
ADL retraining/balance training/bed mobility training/IADL retraining/strengthening/transfer training

## 2018-03-15 NOTE — OCCUPATIONAL THERAPY INITIAL EVALUATION ADULT - BALANCE TRAINING, PT EVAL
Goal: Pt will demonstrate G dynamic standing balance with least restrictive device in 4 weeks to increase safety for ADL performance and functional mobility.

## 2018-03-15 NOTE — OCCUPATIONAL THERAPY INITIAL EVALUATION ADULT - ANTICIPATED DISCHARGE DISPOSITION, OT EVAL
Home OT to increase independence with ADLs, functional mobility and assess safety in the home environment. Recommend supervision/assist with functional activities prn./home w/ OT

## 2018-03-15 NOTE — OCCUPATIONAL THERAPY INITIAL EVALUATION ADULT - ADL RETRAINING, OT EVAL
Goal: Pt will independently complete UB dressing/LB dressing/toileting/bathing using AE prn in 4 weeks.

## 2018-03-15 NOTE — CHART NOTE - NSCHARTNOTEFT_GEN_A_CORE
Patient visited for drain pull. Suture d/c'd.  Hemostasis achieved, patient tolerated well, tip intact. 4x4 Dsg/tegaderm placed.    Segundo Cuello PA-C  Team Pager: #[phone]

## 2018-03-15 NOTE — DISCHARGE NOTE ADULT - CARE PROVIDER_API CALL
Toi Boss), Orthopaedic Surgery  [address] [place] [address]  Phone: [phone]  Fax: [phone]

## 2018-03-15 NOTE — DISCHARGE NOTE ADULT - HOSPITAL COURSE
History of Present Illness	  84 y/o male with PMH of CAD, PPM (Medtronic), AS, DM type2, HTN, Hypothyroidism, with complaints of lower back pain x 1 year/ found to have herniated disc planned for L3-S1 Posterior lumbar laminectomies, L5-S1 lumbar discectomy.     Allergies/Medications:   Allergies:        Allergies:  	No Known Allergies:     Home Medications:   * Patient Currently Takes Medications as of 07-Mar-2018 10:02 documented in Structured Notes  · 	Synthroid 75 mcg (0.075 mg) oral tablet: Last Dose Taken:  , 1 tab(s) orally once a day  · 	Ecotrin Adult Low Strength 81 mg oral delayed release tablet: Last Dose Taken:  , 1 tab(s) orally once a day  · 	metFORMIN 500 mg oral tablet, extended release: Last Dose Taken:  , 1 tab(s) orally once a day  · 	metoprolol succinate 25 mg oral tablet, extended release: Last Dose Taken:  , 1 tab(s) orally once a day  · 	simvastatin 40 mg oral tablet: Last Dose Taken:  , 1 tab(s) orally once a day (at bedtime)    PMH/PSH/FH/SH:    Past Medical History:  Aortic stenosis    CAD (coronary artery disease)  denies any hx of MI or cardiac stents  DM (diabetes mellitus)  type 2  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     Past Surgical History:  Artificial pacemaker  2008Medtronic [id number] model [id number] generater change 2017  H/O hernia repair.    3/13/18-Patient admitted to [facility].  Underwent an uncomplicated L3-S1 laminectomy with L4-L5 discectomy.  3/15/18-Evaluated and treated by PT, with recommendations for home with home PT. History of Present Illness	  84 y/o male with PMH of CAD, PPM (Medtronic), AS, DM type2, HTN, Hypothyroidism, with complaints of lower back pain x 1 year/ found to have herniated disc planned for L3-S1 Posterior lumbar laminectomies, L5-S1 lumbar discectomy.     Allergies/Medications:   Allergies:        Allergies:  	No Known Allergies:     Home Medications:   * Patient Currently Takes Medications as of 07-Mar-2018 10:02 documented in Structured Notes  · 	Synthroid 75 mcg (0.075 mg) oral tablet: Last Dose Taken:  , 1 tab(s) orally once a day  · 	Ecotrin Adult Low Strength 81 mg oral delayed release tablet: Last Dose Taken:  , 1 tab(s) orally once a day  · 	metFORMIN 500 mg oral tablet, extended release: Last Dose Taken:  , 1 tab(s) orally once a day  · 	metoprolol succinate 25 mg oral tablet, extended release: Last Dose Taken:  , 1 tab(s) orally once a day  · 	simvastatin 40 mg oral tablet: Last Dose Taken:  , 1 tab(s) orally once a day (at bedtime)    PMH/PSH/FH/SH:    Past Medical History:  Aortic stenosis    CAD (coronary artery disease)  denies any hx of MI or cardiac stents  DM (diabetes mellitus)  type 2  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     Past Surgical History:  Artificial pacemaker  2008Medtronic [id number] model A2DR01 generater change 2017  H/O hernia repair.    3/13/18-Patient presents to the hospital for elective surgery, underwent a Lumbar laminectomy L3-S1 with discectomy [id number] procedure without complications.  Patient was evaluated by PT, whom recommended home for discharge plan. Patient is stable for discharge when cleared by PT.

## 2018-03-16 VITALS
HEART RATE: 67 BPM | SYSTOLIC BLOOD PRESSURE: 153 MMHG | OXYGEN SATURATION: 95 % | TEMPERATURE: 98 F | DIASTOLIC BLOOD PRESSURE: 75 MMHG | RESPIRATION RATE: 18 BRPM

## 2018-03-16 LAB
ANION GAP SERPL CALC-SCNC: 13 MMOL/L — SIGNIFICANT CHANGE UP (ref 5–17)
BASOPHILS # BLD AUTO: 0.01 K/UL — SIGNIFICANT CHANGE UP (ref 0–0.2)
BASOPHILS NFR BLD AUTO: 0.1 % — SIGNIFICANT CHANGE UP (ref 0–2)
BUN SERPL-MCNC: 13 MG/DL — SIGNIFICANT CHANGE UP (ref 7–23)
CALCIUM SERPL-MCNC: 8.9 MG/DL — SIGNIFICANT CHANGE UP (ref 8.4–10.5)
CHLORIDE SERPL-SCNC: 101 MMOL/L — SIGNIFICANT CHANGE UP (ref 96–108)
CO2 SERPL-SCNC: 26 MMOL/L — SIGNIFICANT CHANGE UP (ref 22–31)
CREAT SERPL-MCNC: 0.84 MG/DL — SIGNIFICANT CHANGE UP (ref 0.5–1.3)
EOSINOPHIL # BLD AUTO: 0.05 K/UL — SIGNIFICANT CHANGE UP (ref 0–0.5)
EOSINOPHIL NFR BLD AUTO: 0.7 % — SIGNIFICANT CHANGE UP (ref 0–6)
GLUCOSE BLDC GLUCOMTR-MCNC: 148 MG/DL — HIGH (ref 70–99)
GLUCOSE SERPL-MCNC: 145 MG/DL — HIGH (ref 70–99)
HCT VFR BLD CALC: 39.8 % — SIGNIFICANT CHANGE UP (ref 39–50)
HGB BLD-MCNC: 13.3 G/DL — SIGNIFICANT CHANGE UP (ref 13–17)
IMM GRANULOCYTES NFR BLD AUTO: 0.4 % — SIGNIFICANT CHANGE UP (ref 0–1.5)
LYMPHOCYTES # BLD AUTO: 1.08 K/UL — SIGNIFICANT CHANGE UP (ref 1–3.3)
LYMPHOCYTES # BLD AUTO: 14.1 % — SIGNIFICANT CHANGE UP (ref 13–44)
MANUAL SMEAR VERIFICATION: SIGNIFICANT CHANGE UP
MCHC RBC-ENTMCNC: 31.4 PG — SIGNIFICANT CHANGE UP (ref 27–34)
MCHC RBC-ENTMCNC: 33.4 GM/DL — SIGNIFICANT CHANGE UP (ref 32–36)
MCV RBC AUTO: 94.1 FL — SIGNIFICANT CHANGE UP (ref 80–100)
MONOCYTES # BLD AUTO: 0.74 K/UL — SIGNIFICANT CHANGE UP (ref 0–0.9)
MONOCYTES NFR BLD AUTO: 9.7 % — SIGNIFICANT CHANGE UP (ref 2–14)
NEUTROPHILS # BLD AUTO: 5.74 K/UL — SIGNIFICANT CHANGE UP (ref 1.8–7.4)
NEUTROPHILS NFR BLD AUTO: 75 % — SIGNIFICANT CHANGE UP (ref 43–77)
PLAT MORPH BLD: NORMAL — SIGNIFICANT CHANGE UP
PLATELET # BLD AUTO: SIGNIFICANT CHANGE UP K/UL (ref 150–400)
POTASSIUM SERPL-MCNC: 3.8 MMOL/L — SIGNIFICANT CHANGE UP (ref 3.5–5.3)
POTASSIUM SERPL-SCNC: 3.8 MMOL/L — SIGNIFICANT CHANGE UP (ref 3.5–5.3)
RBC # BLD: 4.23 M/UL — SIGNIFICANT CHANGE UP (ref 4.2–5.8)
RBC # FLD: 13.2 % — SIGNIFICANT CHANGE UP (ref 10.3–14.5)
RBC BLD AUTO: NORMAL — SIGNIFICANT CHANGE UP
SODIUM SERPL-SCNC: 140 MMOL/L — SIGNIFICANT CHANGE UP (ref 135–145)
WBC # BLD: 7.65 K/UL — SIGNIFICANT CHANGE UP (ref 3.8–10.5)
WBC # FLD AUTO: 7.65 K/UL — SIGNIFICANT CHANGE UP (ref 3.8–10.5)

## 2018-03-16 PROCEDURE — 97161 PT EVAL LOW COMPLEX 20 MIN: CPT

## 2018-03-16 PROCEDURE — 82962 GLUCOSE BLOOD TEST: CPT

## 2018-03-16 PROCEDURE — 72020 X-RAY EXAM OF SPINE 1 VIEW: CPT

## 2018-03-16 PROCEDURE — C1889: CPT

## 2018-03-16 PROCEDURE — 86900 BLOOD TYPING SEROLOGIC ABO: CPT

## 2018-03-16 PROCEDURE — 97165 OT EVAL LOW COMPLEX 30 MIN: CPT

## 2018-03-16 PROCEDURE — 97116 GAIT TRAINING THERAPY: CPT

## 2018-03-16 PROCEDURE — C1769: CPT

## 2018-03-16 PROCEDURE — 88304 TISSUE EXAM BY PATHOLOGIST: CPT

## 2018-03-16 PROCEDURE — 97530 THERAPEUTIC ACTIVITIES: CPT

## 2018-03-16 PROCEDURE — 83036 HEMOGLOBIN GLYCOSYLATED A1C: CPT

## 2018-03-16 PROCEDURE — 80048 BASIC METABOLIC PNL TOTAL CA: CPT

## 2018-03-16 PROCEDURE — 85027 COMPLETE CBC AUTOMATED: CPT

## 2018-03-16 PROCEDURE — 86901 BLOOD TYPING SEROLOGIC RH(D): CPT

## 2018-03-16 PROCEDURE — 86850 RBC ANTIBODY SCREEN: CPT

## 2018-03-16 PROCEDURE — 88311 DECALCIFY TISSUE: CPT

## 2018-03-16 RX ADMIN — Medication 25 MILLIGRAM(S): at 05:41

## 2018-03-16 RX ADMIN — Medication 75 MICROGRAM(S): at 05:41

## 2018-03-16 NOTE — PROGRESS NOTE ADULT - SUBJECTIVE AND OBJECTIVE BOX
Patient seen and examined; nothing acute overnight; overall pain controlled, still having some discomfort and trouble sleeping, planning on working w/ PT today OOB    Vital Signs Last 24 Hrs  Vital Signs Last 24 Hrs  T(C): 37 (15 Mar 2018 04:39), Max: 37.4 (14 Mar 2018 23:43)  T(F): 98.6 (15 Mar 2018 04:39), Max: 99.3 (14 Mar 2018 23:43)  HR: 70 (15 Mar 2018 04:39) (60 - 74)  BP: 129/69 (15 Mar 2018 04:39) (108/69 - 129/70)  BP(mean): --  RR: 18 (15 Mar 2018 04:39) (18 - 18)  SpO2: 94% (15 Mar 2018 04:39) (94% - 95%)      NAD  Lumbar dressing c/d/i  HV in place: 35/60  5/5 R IP/Q/H/TA/GS/EHL/FHL  5/5 L IP/Q/H/GS/FHL  3+/5 L TA/EHL  SILT, Tingling in LLE improved  WWP
Patient seen and examined; nothing acute overnight; pain well controlled    Vital Signs Last 24 Hrs  T(C): 36.7 (14 Mar 2018 05:30), Max: 36.7 (14 Mar 2018 05:30)  T(F): 98.1 (14 Mar 2018 05:30), Max: 98.1 (14 Mar 2018 05:30)  HR: 73 (14 Mar 2018 05:30) (59 - 85)  BP: 111/68 (14 Mar 2018 05:30) (84/52 - 150/91)  BP(mean): 67 (13 Mar 2018 22:00) (64 - 113)  RR: 18 (14 Mar 2018 05:30) (15 - 20)  SpO2: 96% (14 Mar 2018 05:30) (94% - 99%)    03-13    141  |  104  |  20  ----------------------------<  194<H>  4.3   |  24  |  0.94    Ca    8.4      13 Mar 2018 19:15                            14.9   8.7   )-----------( 108      ( 13 Mar 2018 19:15 )             42.9     NAD  Lumbar dressing c/d/i  HV in place  5/5 R IP/Q/H/TA/GS/EHL/FHL  5/5 L IP/Q/H/GS/FHL  3+/5 L TA/EHL  SILT, some tingling on dorsum of B/L feet  WWP
Patient seen and examined; pain controlled on PO regimen    Vital Signs Last 24 Hrs  T(C): 36.9 (16 Mar 2018 05:19), Max: 37.7 (15 Mar 2018 21:30)  T(F): 98.5 (16 Mar 2018 05:19), Max: 99.8 (15 Mar 2018 21:30)  HR: 69 (16 Mar 2018 05:19) (66 - 77)  BP: 150/54 (16 Mar 2018 05:19) (71/47 - 150/54)  BP(mean): --  RR: 18 (16 Mar 2018 05:19) (18 - 18)  SpO2: 96% (16 Mar 2018 05:19) (94% - 97%)    03-15    139  |  102  |  21  ----------------------------<  167<H>  4.0   |  26  |  0.96    Ca    8.6      15 Mar 2018 06:14                            12.6   9.65  )-----------( 108      ( 15 Mar 2018 07:42 )             39.9     NAD  Lumbar dressing changed, incision c/d/i  5/5 R IP/Q/H/GS/TA/EHL/FHL  5/5 L IP/Q/H/GS/FHL, 3+/5 L TA/EHL (improving)  URBANO CANTRELL

## 2018-03-16 NOTE — PROGRESS NOTE ADULT - ASSESSMENT
83M POD2 s/p L3-S1 lami, L4/5 discectomy  1. Pain control  2. Venodynes  3. Continue HV  4. ASA 81  5. WBAT/PT/OT/OOB  6. DC Planning      Frantz Bell III, D.O.  PGY IV Orthopedic Resident  # [id number]
83M POD1 s/p L3-S1 lami, L4/5 discectomy  1. Pain control  2. Venodynes  3. Continue HV  4. ASA 81  5. WBAT/PT/OT/OOB  6. Martínez out after OOB with PT  7. D/W Dr. Gera Lieberman MD
83M POD3 s/p L3-S1 decompression, L4/5 discectomy  1. Pain control  2. Venodynes, ASA81  3. WBAT/PT/OOB  4. Home today  5. D/W Dr. Gera Lieberman MD

## 2018-03-17 ENCOUNTER — CLINICAL ADVICE (OUTPATIENT)
Age: 83
End: 2018-03-17

## 2018-03-23 ENCOUNTER — APPOINTMENT (OUTPATIENT)
Dept: ORTHOPEDIC SURGERY | Facility: CLINIC | Age: 83
End: 2018-03-23
Payer: MEDICARE

## 2018-03-23 VITALS — BODY MASS INDEX: 26.03 KG/M2 | WEIGHT: 162 LBS | HEIGHT: 66 IN

## 2018-03-23 LAB — SURGICAL PATHOLOGY STUDY: SIGNIFICANT CHANGE UP

## 2018-03-23 PROCEDURE — 72100 X-RAY EXAM L-S SPINE 2/3 VWS: CPT

## 2018-03-23 PROCEDURE — 99024 POSTOP FOLLOW-UP VISIT: CPT

## 2018-04-13 ENCOUNTER — APPOINTMENT (OUTPATIENT)
Dept: ORTHOPEDIC SURGERY | Facility: CLINIC | Age: 83
End: 2018-04-13
Payer: MEDICARE

## 2018-04-13 VITALS — WEIGHT: 162 LBS | BODY MASS INDEX: 26.03 KG/M2 | HEIGHT: 66 IN

## 2018-04-13 PROCEDURE — 99024 POSTOP FOLLOW-UP VISIT: CPT

## 2018-04-29 ENCOUNTER — RX RENEWAL (OUTPATIENT)
Age: 83
End: 2018-04-29

## 2018-05-21 ENCOUNTER — APPOINTMENT (OUTPATIENT)
Dept: CARDIOLOGY | Facility: CLINIC | Age: 83
End: 2018-05-21

## 2018-05-25 ENCOUNTER — APPOINTMENT (OUTPATIENT)
Dept: ORTHOPEDIC SURGERY | Facility: CLINIC | Age: 83
End: 2018-05-25
Payer: MEDICARE

## 2018-05-25 VITALS — WEIGHT: 162 LBS | BODY MASS INDEX: 24.55 KG/M2 | HEIGHT: 68 IN

## 2018-05-25 DIAGNOSIS — M48.061 SPINAL STENOSIS, LUMBAR REGION WITHOUT NEUROGENIC CLAUDICATION: ICD-10-CM

## 2018-05-25 DIAGNOSIS — M54.16 RADICULOPATHY, LUMBAR REGION: ICD-10-CM

## 2018-05-25 PROCEDURE — 99024 POSTOP FOLLOW-UP VISIT: CPT

## 2018-05-25 RX ORDER — TRAMADOL HYDROCHLORIDE 50 MG/1
50 TABLET, COATED ORAL
Qty: 28 | Refills: 0 | Status: DISCONTINUED | COMMUNITY
Start: 2018-03-17 | End: 2018-05-25

## 2018-05-25 RX ORDER — TRAMADOL HYDROCHLORIDE 50 MG/1
50 TABLET, COATED ORAL 4 TIMES DAILY
Qty: 60 | Refills: 1 | Status: DISCONTINUED | COMMUNITY
Start: 2018-03-23 | End: 2018-05-25

## 2018-06-04 ENCOUNTER — APPOINTMENT (OUTPATIENT)
Dept: CARDIOLOGY | Facility: CLINIC | Age: 83
End: 2018-06-04
Payer: MEDICARE

## 2018-06-04 PROCEDURE — 93293 PM PHONE R-STRIP DEVICE EVAL: CPT

## 2018-07-16 ENCOUNTER — APPOINTMENT (OUTPATIENT)
Dept: CARDIOLOGY | Facility: CLINIC | Age: 83
End: 2018-07-16
Payer: MEDICARE

## 2018-07-16 ENCOUNTER — NON-APPOINTMENT (OUTPATIENT)
Age: 83
End: 2018-07-16

## 2018-07-16 VITALS
DIASTOLIC BLOOD PRESSURE: 75 MMHG | HEART RATE: 62 BPM | HEIGHT: 66 IN | RESPIRATION RATE: 15 BRPM | BODY MASS INDEX: 26.36 KG/M2 | OXYGEN SATURATION: 98 % | WEIGHT: 164 LBS | SYSTOLIC BLOOD PRESSURE: 140 MMHG

## 2018-07-16 DIAGNOSIS — I35.9 NONRHEUMATIC AORTIC VALVE DISORDER, UNSPECIFIED: ICD-10-CM

## 2018-07-16 DIAGNOSIS — E78.5 HYPERLIPIDEMIA, UNSPECIFIED: ICD-10-CM

## 2018-07-16 DIAGNOSIS — I10 ESSENTIAL (PRIMARY) HYPERTENSION: ICD-10-CM

## 2018-07-16 DIAGNOSIS — I25.10 ATHEROSCLEROTIC HEART DISEASE OF NATIVE CORONARY ARTERY W/OUT ANGINA PECTORIS: ICD-10-CM

## 2018-07-16 PROBLEM — E11.9 TYPE 2 DIABETES MELLITUS WITHOUT COMPLICATIONS: Chronic | Status: ACTIVE | Noted: 2017-03-27

## 2018-07-16 PROBLEM — E03.9 HYPOTHYROIDISM, UNSPECIFIED: Chronic | Status: ACTIVE | Noted: 2017-03-27

## 2018-07-16 PROCEDURE — 93000 ELECTROCARDIOGRAM COMPLETE: CPT

## 2018-07-16 PROCEDURE — 99214 OFFICE O/P EST MOD 30 MIN: CPT

## 2018-08-04 ENCOUNTER — INPATIENT (INPATIENT)
Facility: HOSPITAL | Age: 83
LOS: 7 days | DRG: 813 | End: 2018-08-12
Attending: INTERNAL MEDICINE | Admitting: INTERNAL MEDICINE
Payer: MEDICARE

## 2018-08-04 VITALS
DIASTOLIC BLOOD PRESSURE: 74 MMHG | SYSTOLIC BLOOD PRESSURE: 179 MMHG | WEIGHT: 164.02 LBS | OXYGEN SATURATION: 97 % | TEMPERATURE: 97 F | HEIGHT: 67 IN | HEART RATE: 53 BPM | RESPIRATION RATE: 14 BRPM

## 2018-08-04 DIAGNOSIS — D69.6 THROMBOCYTOPENIA, UNSPECIFIED: ICD-10-CM

## 2018-08-04 DIAGNOSIS — Z98.890 OTHER SPECIFIED POSTPROCEDURAL STATES: Chronic | ICD-10-CM

## 2018-08-04 DIAGNOSIS — E11.9 TYPE 2 DIABETES MELLITUS WITHOUT COMPLICATIONS: ICD-10-CM

## 2018-08-04 DIAGNOSIS — Z29.9 ENCOUNTER FOR PROPHYLACTIC MEASURES, UNSPECIFIED: ICD-10-CM

## 2018-08-04 DIAGNOSIS — E78.5 HYPERLIPIDEMIA, UNSPECIFIED: ICD-10-CM

## 2018-08-04 DIAGNOSIS — M48.00 SPINAL STENOSIS, SITE UNSPECIFIED: Chronic | ICD-10-CM

## 2018-08-04 DIAGNOSIS — I10 ESSENTIAL (PRIMARY) HYPERTENSION: ICD-10-CM

## 2018-08-04 DIAGNOSIS — Z95.0 PRESENCE OF CARDIAC PACEMAKER: Chronic | ICD-10-CM

## 2018-08-04 DIAGNOSIS — Z95.0 PRESENCE OF CARDIAC PACEMAKER: ICD-10-CM

## 2018-08-04 DIAGNOSIS — E03.9 HYPOTHYROIDISM, UNSPECIFIED: ICD-10-CM

## 2018-08-04 DIAGNOSIS — D69.9 HEMORRHAGIC CONDITION, UNSPECIFIED: ICD-10-CM

## 2018-08-04 DIAGNOSIS — K13.70 UNSPECIFIED LESIONS OF ORAL MUCOSA: ICD-10-CM

## 2018-08-04 PROBLEM — N20.0 CALCULUS OF KIDNEY: Chronic | Status: ACTIVE | Noted: 2018-03-07

## 2018-08-04 PROBLEM — I35.0 NONRHEUMATIC AORTIC (VALVE) STENOSIS: Chronic | Status: ACTIVE | Noted: 2018-03-07

## 2018-08-04 PROBLEM — I25.10 ATHEROSCLEROTIC HEART DISEASE OF NATIVE CORONARY ARTERY WITHOUT ANGINA PECTORIS: Chronic | Status: ACTIVE | Noted: 2018-03-07

## 2018-08-04 PROBLEM — M51.26 OTHER INTERVERTEBRAL DISC DISPLACEMENT, LUMBAR REGION: Chronic | Status: ACTIVE | Noted: 2018-03-07

## 2018-08-04 PROBLEM — G61.0 GUILLAIN-BARRE SYNDROME: Chronic | Status: ACTIVE | Noted: 2018-03-07

## 2018-08-04 PROBLEM — C91.41 HAIRY CELL LEUKEMIA, IN REMISSION: Chronic | Status: ACTIVE | Noted: 2018-03-07

## 2018-08-04 PROBLEM — M54.16 RADICULOPATHY, LUMBAR REGION: Chronic | Status: ACTIVE | Noted: 2018-03-07

## 2018-08-04 LAB
ALBUMIN SERPL ELPH-MCNC: 3.2 G/DL — LOW (ref 3.3–5)
ALP SERPL-CCNC: 63 U/L — SIGNIFICANT CHANGE UP (ref 40–120)
ALT FLD-CCNC: 24 U/L — SIGNIFICANT CHANGE UP (ref 12–78)
ANION GAP SERPL CALC-SCNC: 8 MMOL/L — SIGNIFICANT CHANGE UP (ref 5–17)
APPEARANCE UR: CLEAR — SIGNIFICANT CHANGE UP
AST SERPL-CCNC: 30 U/L — SIGNIFICANT CHANGE UP (ref 15–37)
BACTERIA # UR AUTO: ABNORMAL
BASOPHILS # BLD AUTO: 0.03 K/UL — SIGNIFICANT CHANGE UP (ref 0–0.2)
BASOPHILS NFR BLD AUTO: 0.5 % — SIGNIFICANT CHANGE UP (ref 0–2)
BILIRUB SERPL-MCNC: 0.6 MG/DL — SIGNIFICANT CHANGE UP (ref 0.2–1.2)
BILIRUB UR-MCNC: NEGATIVE — SIGNIFICANT CHANGE UP
BLD GP AB SCN SERPL QL: SIGNIFICANT CHANGE UP
BUN SERPL-MCNC: 24 MG/DL — HIGH (ref 7–23)
CALCIUM SERPL-MCNC: 8.2 MG/DL — LOW (ref 8.5–10.1)
CHLORIDE SERPL-SCNC: 109 MMOL/L — HIGH (ref 96–108)
CO2 SERPL-SCNC: 25 MMOL/L — SIGNIFICANT CHANGE UP (ref 22–31)
COLOR SPEC: YELLOW — SIGNIFICANT CHANGE UP
CREAT SERPL-MCNC: 1.1 MG/DL — SIGNIFICANT CHANGE UP (ref 0.5–1.3)
DIFF PNL FLD: ABNORMAL
EOSINOPHIL # BLD AUTO: 0.19 K/UL — SIGNIFICANT CHANGE UP (ref 0–0.5)
EOSINOPHIL NFR BLD AUTO: 3.4 % — SIGNIFICANT CHANGE UP (ref 0–6)
EPI CELLS # UR: SIGNIFICANT CHANGE UP
GLUCOSE SERPL-MCNC: 151 MG/DL — HIGH (ref 70–99)
GLUCOSE UR QL: NEGATIVE — SIGNIFICANT CHANGE UP
HCT VFR BLD CALC: 41.9 % — SIGNIFICANT CHANGE UP (ref 39–50)
HGB BLD-MCNC: 14.5 G/DL — SIGNIFICANT CHANGE UP (ref 13–17)
IMM GRANULOCYTES NFR BLD AUTO: 0.5 % — SIGNIFICANT CHANGE UP (ref 0–1.5)
INR BLD: 0.95 RATIO — SIGNIFICANT CHANGE UP (ref 0.88–1.16)
KETONES UR-MCNC: NEGATIVE — SIGNIFICANT CHANGE UP
LEUKOCYTE ESTERASE UR-ACNC: NEGATIVE — SIGNIFICANT CHANGE UP
LYMPHOCYTES # BLD AUTO: 1.01 K/UL — SIGNIFICANT CHANGE UP (ref 1–3.3)
LYMPHOCYTES # BLD AUTO: 18.2 % — SIGNIFICANT CHANGE UP (ref 13–44)
MCHC RBC-ENTMCNC: 30.5 PG — SIGNIFICANT CHANGE UP (ref 27–34)
MCHC RBC-ENTMCNC: 34.6 GM/DL — SIGNIFICANT CHANGE UP (ref 32–36)
MCV RBC AUTO: 88 FL — SIGNIFICANT CHANGE UP (ref 80–100)
MONOCYTES # BLD AUTO: 0.56 K/UL — SIGNIFICANT CHANGE UP (ref 0–0.9)
MONOCYTES NFR BLD AUTO: 10.1 % — SIGNIFICANT CHANGE UP (ref 2–14)
NEUTROPHILS # BLD AUTO: 3.73 K/UL — SIGNIFICANT CHANGE UP (ref 1.8–7.4)
NEUTROPHILS NFR BLD AUTO: 67.3 % — SIGNIFICANT CHANGE UP (ref 43–77)
NITRITE UR-MCNC: NEGATIVE — SIGNIFICANT CHANGE UP
NRBC # BLD: 0 /100 WBCS — SIGNIFICANT CHANGE UP (ref 0–0)
PH UR: 7 — SIGNIFICANT CHANGE UP (ref 5–8)
PLATELET # BLD AUTO: <3 K/UL — CRITICAL LOW (ref 150–400)
POTASSIUM SERPL-MCNC: 4.4 MMOL/L — SIGNIFICANT CHANGE UP (ref 3.5–5.3)
POTASSIUM SERPL-SCNC: 4.4 MMOL/L — SIGNIFICANT CHANGE UP (ref 3.5–5.3)
PROT SERPL-MCNC: 6.4 G/DL — SIGNIFICANT CHANGE UP (ref 6–8.3)
PROT UR-MCNC: NEGATIVE — SIGNIFICANT CHANGE UP
PROTHROM AB SERPL-ACNC: 10.3 SEC — SIGNIFICANT CHANGE UP (ref 9.8–12.7)
RBC # BLD: 4.76 M/UL — SIGNIFICANT CHANGE UP (ref 4.2–5.8)
RBC # FLD: 12.8 % — SIGNIFICANT CHANGE UP (ref 10.3–14.5)
RBC CASTS # UR COMP ASSIST: ABNORMAL /HPF (ref 0–4)
SODIUM SERPL-SCNC: 142 MMOL/L — SIGNIFICANT CHANGE UP (ref 135–145)
SP GR SPEC: 1.01 — SIGNIFICANT CHANGE UP (ref 1.01–1.02)
UROBILINOGEN FLD QL: NEGATIVE — SIGNIFICANT CHANGE UP
WBC # BLD: 5.55 K/UL — SIGNIFICANT CHANGE UP (ref 3.8–10.5)
WBC # FLD AUTO: 5.55 K/UL — SIGNIFICANT CHANGE UP (ref 3.8–10.5)
WBC UR QL: SIGNIFICANT CHANGE UP

## 2018-08-04 PROCEDURE — 99223 1ST HOSP IP/OBS HIGH 75: CPT | Mod: GC,AI

## 2018-08-04 PROCEDURE — 99285 EMERGENCY DEPT VISIT HI MDM: CPT

## 2018-08-04 PROCEDURE — 71045 X-RAY EXAM CHEST 1 VIEW: CPT | Mod: 26

## 2018-08-04 PROCEDURE — 93010 ELECTROCARDIOGRAM REPORT: CPT

## 2018-08-04 RX ORDER — SODIUM CHLORIDE 9 MG/ML
1000 INJECTION, SOLUTION INTRAVENOUS
Qty: 0 | Refills: 0 | Status: DISCONTINUED | OUTPATIENT
Start: 2018-08-04 | End: 2018-08-12

## 2018-08-04 RX ORDER — ASPIRIN/CALCIUM CARB/MAGNESIUM 324 MG
1 TABLET ORAL
Qty: 0 | Refills: 0 | COMMUNITY

## 2018-08-04 RX ORDER — DIPHENHYDRAMINE HCL 50 MG
50 CAPSULE ORAL DAILY
Qty: 0 | Refills: 0 | Status: DISCONTINUED | OUTPATIENT
Start: 2018-08-04 | End: 2018-08-04

## 2018-08-04 RX ORDER — DEXAMETHASONE 0.5 MG/5ML
40 ELIXIR ORAL DAILY
Qty: 0 | Refills: 0 | Status: COMPLETED | OUTPATIENT
Start: 2018-08-05 | End: 2018-08-07

## 2018-08-04 RX ORDER — SODIUM CHLORIDE 9 MG/ML
1000 INJECTION INTRAMUSCULAR; INTRAVENOUS; SUBCUTANEOUS ONCE
Qty: 0 | Refills: 0 | Status: COMPLETED | OUTPATIENT
Start: 2018-08-04 | End: 2018-08-04

## 2018-08-04 RX ORDER — ACETAMINOPHEN 500 MG
650 TABLET ORAL DAILY
Qty: 0 | Refills: 0 | Status: DISCONTINUED | OUTPATIENT
Start: 2018-08-04 | End: 2018-08-04

## 2018-08-04 RX ORDER — LEVOTHYROXINE SODIUM 125 MCG
1 TABLET ORAL
Qty: 0 | Refills: 0 | COMMUNITY

## 2018-08-04 RX ORDER — DEXTROSE 50 % IN WATER 50 %
15 SYRINGE (ML) INTRAVENOUS ONCE
Qty: 0 | Refills: 0 | Status: DISCONTINUED | OUTPATIENT
Start: 2018-08-04 | End: 2018-08-12

## 2018-08-04 RX ORDER — SIMVASTATIN 20 MG/1
1 TABLET, FILM COATED ORAL
Qty: 0 | Refills: 0 | COMMUNITY

## 2018-08-04 RX ORDER — ACETAMINOPHEN 500 MG
650 TABLET ORAL DAILY
Qty: 0 | Refills: 0 | Status: COMPLETED | OUTPATIENT
Start: 2018-08-04 | End: 2018-08-06

## 2018-08-04 RX ORDER — DEXAMETHASONE 0.5 MG/5ML
40 ELIXIR ORAL DAILY
Qty: 0 | Refills: 0 | Status: DISCONTINUED | OUTPATIENT
Start: 2018-08-04 | End: 2018-08-04

## 2018-08-04 RX ORDER — ASPIRIN/CALCIUM CARB/MAGNESIUM 324 MG
81 TABLET ORAL DAILY
Qty: 0 | Refills: 0 | Status: DISCONTINUED | OUTPATIENT
Start: 2018-08-04 | End: 2018-08-04

## 2018-08-04 RX ORDER — METOPROLOL TARTRATE 50 MG
25 TABLET ORAL DAILY
Qty: 0 | Refills: 0 | Status: DISCONTINUED | OUTPATIENT
Start: 2018-08-04 | End: 2018-08-12

## 2018-08-04 RX ORDER — INSULIN LISPRO 100/ML
VIAL (ML) SUBCUTANEOUS AT BEDTIME
Qty: 0 | Refills: 0 | Status: DISCONTINUED | OUTPATIENT
Start: 2018-08-04 | End: 2018-08-12

## 2018-08-04 RX ORDER — DEXAMETHASONE 0.5 MG/5ML
40 ELIXIR ORAL ONCE
Qty: 0 | Refills: 0 | Status: COMPLETED | OUTPATIENT
Start: 2018-08-04 | End: 2018-08-04

## 2018-08-04 RX ORDER — DEXTROSE 50 % IN WATER 50 %
25 SYRINGE (ML) INTRAVENOUS ONCE
Qty: 0 | Refills: 0 | Status: DISCONTINUED | OUTPATIENT
Start: 2018-08-04 | End: 2018-08-12

## 2018-08-04 RX ORDER — SIMVASTATIN 20 MG/1
40 TABLET, FILM COATED ORAL AT BEDTIME
Qty: 0 | Refills: 0 | Status: DISCONTINUED | OUTPATIENT
Start: 2018-08-04 | End: 2018-08-12

## 2018-08-04 RX ORDER — IMMUNE GLOBULIN,GAMMA(IGG) 5 %
75 VIAL (ML) INTRAVENOUS DAILY
Qty: 0 | Refills: 0 | Status: DISCONTINUED | OUTPATIENT
Start: 2018-08-04 | End: 2018-08-06

## 2018-08-04 RX ORDER — LEVOTHYROXINE SODIUM 125 MCG
75 TABLET ORAL DAILY
Qty: 0 | Refills: 0 | Status: DISCONTINUED | OUTPATIENT
Start: 2018-08-04 | End: 2018-08-12

## 2018-08-04 RX ORDER — LANOLIN ALCOHOL/MO/W.PET/CERES
3 CREAM (GRAM) TOPICAL ONCE
Qty: 0 | Refills: 0 | Status: COMPLETED | OUTPATIENT
Start: 2018-08-04 | End: 2018-08-04

## 2018-08-04 RX ORDER — INSULIN LISPRO 100/ML
VIAL (ML) SUBCUTANEOUS
Qty: 0 | Refills: 0 | Status: DISCONTINUED | OUTPATIENT
Start: 2018-08-04 | End: 2018-08-12

## 2018-08-04 RX ORDER — METFORMIN HYDROCHLORIDE 850 MG/1
1 TABLET ORAL
Qty: 0 | Refills: 0 | COMMUNITY

## 2018-08-04 RX ORDER — DIPHENHYDRAMINE HCL 50 MG
50 CAPSULE ORAL DAILY
Qty: 0 | Refills: 0 | Status: COMPLETED | OUTPATIENT
Start: 2018-08-04 | End: 2018-08-06

## 2018-08-04 RX ORDER — METOPROLOL TARTRATE 50 MG
1 TABLET ORAL
Qty: 0 | Refills: 0 | COMMUNITY

## 2018-08-04 RX ORDER — GLUCAGON INJECTION, SOLUTION 0.5 MG/.1ML
1 INJECTION, SOLUTION SUBCUTANEOUS ONCE
Qty: 0 | Refills: 0 | Status: DISCONTINUED | OUTPATIENT
Start: 2018-08-04 | End: 2018-08-12

## 2018-08-04 RX ORDER — PANTOPRAZOLE SODIUM 20 MG/1
40 TABLET, DELAYED RELEASE ORAL
Qty: 0 | Refills: 0 | Status: DISCONTINUED | OUTPATIENT
Start: 2018-08-04 | End: 2018-08-12

## 2018-08-04 RX ORDER — DEXTROSE 50 % IN WATER 50 %
12.5 SYRINGE (ML) INTRAVENOUS ONCE
Qty: 0 | Refills: 0 | Status: DISCONTINUED | OUTPATIENT
Start: 2018-08-04 | End: 2018-08-12

## 2018-08-04 RX ADMIN — SIMVASTATIN 40 MILLIGRAM(S): 20 TABLET, FILM COATED ORAL at 21:59

## 2018-08-04 RX ADMIN — Medication 50 MILLIGRAM(S): at 13:55

## 2018-08-04 RX ADMIN — Medication 3 MILLIGRAM(S): at 22:37

## 2018-08-04 RX ADMIN — SODIUM CHLORIDE 1000 MILLILITER(S): 9 INJECTION INTRAMUSCULAR; INTRAVENOUS; SUBCUTANEOUS at 08:52

## 2018-08-04 RX ADMIN — SODIUM CHLORIDE 1000 MILLILITER(S): 9 INJECTION INTRAMUSCULAR; INTRAVENOUS; SUBCUTANEOUS at 09:52

## 2018-08-04 RX ADMIN — Medication 125 GRAM(S): at 16:11

## 2018-08-04 RX ADMIN — Medication 120 MILLIGRAM(S): at 11:12

## 2018-08-04 RX ADMIN — Medication 2: at 16:43

## 2018-08-04 RX ADMIN — Medication 650 MILLIGRAM(S): at 13:57

## 2018-08-04 NOTE — H&P ADULT - PROBLEM SELECTOR PLAN 8
- Continue home aspirin  - Padua score of 1 - SCDs given thrombocytopenia - Hold home aspirin given platelet count  - Padua score of 1 - SCDs given thrombocytopenia

## 2018-08-04 NOTE — CONSULT NOTE ADULT - ASSESSMENT
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 Daryl Allred, now admitted with 1 day history of petichial rash on LEs and bleeding from oral muco-cutaneous tissue; platelet count <3 documented and confirmed with review of peripheral blood smear with completely normal WBC and Hg level; working diagnosis of ITP  - no role for transfusion of platelets as patient does not have life threatening bleeding  - Decadron 40mg IV X4 days and IVIG 1g/kg IV X2 days  - hold Aspirin until platelet count >50K  - would monitor blood glucose while on high dose pulse decadron  - please start PPI  - follow CBC daily

## 2018-08-04 NOTE — H&P ADULT - PSH
Artificial pacemaker  [address] [place], [place] [address]tronic [id number] model [id number] generater change 2017  H/O hernia repair    Spinal stenosis

## 2018-08-04 NOTE — H&P ADULT - ASSESSMENT
84 year old male with PMH of hairy cell leukemia (in remission, last chemo tx 8 years ago), s/p PPM placed 2017, s/p lumbar laminectomy (3/2018), HTN, HLD, hypothyroidism presenting with petechial rash in bilateral extremities and hamartoma/bleeding in mouth found to be thrombocytopenic and admitted for ITP.

## 2018-08-04 NOTE — ED ADULT NURSE NOTE - PMH
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

## 2018-08-04 NOTE — H&P ADULT - PROBLEM/PLAN-6
DISPLAY PLAN FREE TEXT conducted a detailed discussion... I had a detailed discussion with the patient and/or guardian regarding the historical points, exam findings, and any diagnostic results supporting the discharge/admit diagnosis.

## 2018-08-04 NOTE — H&P ADULT - PROBLEM SELECTOR PLAN 5
- s/p placement in 2017  - place on tele - continue to monitor heart rhythm. - chronic  - s/p placement in 2017  - place on tele - continue to monitor heart rhythm.

## 2018-08-04 NOTE — ED ADULT NURSE NOTE - CHIEF COMPLAINT QUOTE
Pt states" I have a history of Leukemia, now have red mouth sores not painful, sitting blood, little purplish spots on my leg and arms"

## 2018-08-04 NOTE — H&P ADULT - ATTENDING COMMENTS
84 year old male with PMH of hairy cell leukemia (in remission, last chemo tx 8 years ago), s/p PPM placed 2017, s/p lumbar laminectomy (3/2018), HTN, HLD, hypothyroidism presenting with petechial rash in bilateral extremities and hamartoma/bleeding in mouth found to be thrombocytopenic and admitted for ITP Plan: apprec hemat/onco recs, iv steroids and ivig, monitor for bleed, apprec icu consult, continuous pulse ox, aspiration precautions

## 2018-08-04 NOTE — ED PROVIDER NOTE - PROGRESS NOTE DETAILS
Dr. Daryl Holly [phone] Dr. Daryl Holly [phone], paged, awaiting call back spoke with Dr. Holly, labs reviwed, no platelet transfusion for now, recommend to start high dose steroids decadron iv 40mg and IVIG 1gm/kg for 4 days, if patient emergently needs platlets then may transfuse, spoke with heme/onc Dr. Reid, will consult

## 2018-08-04 NOTE — ED ADULT TRIAGE NOTE - CHIEF COMPLAINT QUOTE
Pt states" I have a history of Leukemia, now have red mouth sores not painful, sitting blood, little purplish spots on my leg and arms" Pt states" I have a history of Leukemia,( remission 7 yrs ) now have red mouth sores not painful, sitting blood, little purplish spots on my leg and arms"

## 2018-08-04 NOTE — PATIENT PROFILE ADULT. - ABILITY TO HEAR (WITH HEARING AID OR HEARING APPLIANCE IF NORMALLY USED):
Mildly to Moderately Impaired: difficulty hearing in some environments or speaker may need to increase volume or speak distinctly/Wears bilateral hearing aids.

## 2018-08-04 NOTE — ED PROVIDER NOTE - MEDICAL DECISION MAKING DETAILS
84 male with bilateral lower extremity petechia and inner mucosal hematomas concern for low platelets, f/u type and screen, cbc with screen, cmp, coags, ua, call heme/onc

## 2018-08-04 NOTE — H&P ADULT - PROBLEM SELECTOR PLAN 3
-Hypertensive at 179/94 likely secondary to anxiety from current state - will follow up repeat vitals  - Continue home metoprolol. - chronic, stable  - Hypertensive at 179/94 likely secondary to anxiety from current state - will follow up repeat vitals  - Continue home metoprolol.

## 2018-08-04 NOTE — H&P ADULT - PROBLEM SELECTOR PLAN 2
- Patient presents with 2x1cm hamartoma on right lateral side of the inside of the mouth.  - Not currently bleeding.  - H/H currently stable, continue to montior.  - Place on continuous pulse oximetry.  - Aspiration precautions - ensure bed is elevated above 30 degrees. - acute  -  Patient presents with 2x1cm hamartoma on right lateral side of the inside of the mouth.  - Not currently bleeding.  - H/H currently stable, continue to montior.  - Place on continuous pulse oximetry.  - Aspiration precautions - ensure bed is elevated above 30 degrees.

## 2018-08-04 NOTE — ED PROVIDER NOTE - PMH
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

## 2018-08-04 NOTE — ED ADULT NURSE NOTE - NSIMPLEMENTINTERV_GEN_ALL_ED
Implemented All Universal Safety Interventions:  [place] to call system. Call bell, personal items and telephone within reach. Instruct patient to call for assistance. Room bathroom lighting operational. Non-slip footwear when patient is off stretcher. Physically safe environment: no spills, clutter or unnecessary equipment. Stretcher in lowest position, wheels locked, appropriate side rails in place.

## 2018-08-04 NOTE — ED PROVIDER NOTE - OBJECTIVE STATEMENT
SAVANNAH- Drake  Dio Garcia/Onc- Veronicaali 84 male presents to ER with wife states last night he noted a pimple like mass in his right inner cheek, this morning patient   PMD- [facility]  Cardio- Allan Garcia/Onc- Elayne 84 male presents to ER with wife states last night he noted a pimple like mass in his right inner cheek, this morning patient went to brush teeth and noted his mouth was full of dried blood, then noted bilateral lower extremity petechia and came to the ER. Patient states he feels well, no shortness of breath no chest pain, no fever, no vomiting.  PMD- FederLinch  Cardio- Allan  Heme/Onc- Elayne

## 2018-08-04 NOTE — CONSULT NOTE ADULT - ASSESSMENT
83 y/o male with pmhx of hairy cell leukemia treated with chemotherapy 8 years ago, s/p PPM for sick sinus syndrome, hypothyroid, HTN, spinal stenosis, herniated disk with repair complicated by chronic foot drop and LLE weakness now with severe thrombocytopenia likely a result of ITP with oral blood ulcers and lower extremity petechiae. Patient is currently not a candidate for ICU since he is not actively bleeding. If he starts having active hemorrhaging putting his airway at risk please reconsult.      HEME: Management for ITP as per heme with high dose decadron and IVIG. Keep HOB at 30 degrees for aspiration precautions. Frequent accuchecks while on high dose decadron. Prophylactic protonix. trend CBC. hold all chemical anticaogulation and aspirin. transfuse platelets for life threatening bleeding.           Case discussed in detail with ICU Dr. Núñez.

## 2018-08-04 NOTE — CONSULT NOTE ADULT - SUBJECTIVE AND OBJECTIVE BOX
Patient is a 84y old  Male who presents with a chief complaint of Bleeding under the skin. (04 Aug 2018 10:51)      BRIEF HOSPITAL COURSE: 83 y/o male with pmhx of hairy cell leukemia treated with chemotherapy 8 years ago, s/p PPM for sick sinus syndrome, hypothyroid, HTN, spinal stenosis, herniated disk with repair complicated by chronic foot drop and LLE weakness presents to ED today c/o bleeding in his mouth that started when he woke up this morning. Last night when he got home from dinner with his wife he noticed a pain in his mouth that he described as feeling like a pimple but his wife looked at it and noticed a "blood blister". It was not actively bleeding at the time. This morning when he woke up his wife noticed that he had mroe red spots on his tongue that were actively bleeding and the blood blister was still present. He also noticed bilateral lower extremity petechiae. In ED he was found to have a platelet count of < 3 and oozing from lesions on tongue. He had control of his airway and bleeding stopped after a brief period in ED.     ICU was consulted to evaluate for observing patient in ICU due to patients airway being at high risk if he had active orophayngeal bleeding.     Patient denies chest pain, hematuria, melena, hematemesis, headache, fever, cough, nausea/vomiting/diarrhea.         PAST MEDICAL & SURGICAL HISTORY:  Guillain-[place] syndrome following vaccination:   Hairy cell leukemia, in remission  Aortic stenosis  Kidney stone  Sick sinus syndrome: s/p PPM ,   CAD (coronary artery disease): denies any hx of MI or cardiac stents  Lumbar herniated disc  Lumbar radiculopathy  DM (diabetes mellitus): type 2  HLD (hyperlipidemia)  HTN (hypertension)  Hypothyroid  Spinal stenosis  H/O hernia repair  Artificial pacemaker: [employer]pile [id number] model A2DR01 generater change 2017    Allergies    No Known Allergies    Intolerances      FAMILY HISTORY:  No pertinent family history in first degree relatives      Family history otherwise noncontributory.      Review of Systems:  CONSTITUTIONAL: No fever, chills, or fatigue  EYES: No eye pain, visual disturbances, or discharge  ENMT:  No difficulty hearing, tinnitus, vertigo; No sinus or throat pain  NECK: No pain or stiffness  RESPIRATORY: No cough, wheezing, chills or hemoptysis; No shortness of breath  CARDIOVASCULAR: No chest pain, palpitations, dizziness, or leg swelling  GASTROINTESTINAL: No abdominal or epigastric pain. No nausea, vomiting, or hematemesis; No diarrhea or constipation. No melena or hematochezia.  GENITOURINARY: No dysuria, frequency, hematuria, or incontinence  NEUROLOGICAL: No headaches, memory loss, loss of strength, numbness, or tremors  SKIN: No itching, burning, rashes, or lesions   MUSCULOSKELETAL: No joint pain or swelling; No muscle, back, or extremity pain  PSYCHIATRIC: No depression, anxiety, mood swings, or difficulty sleeping    All other review of systems negative except as mentioned in HPI.      Social History: denies history of illicit drug use, etoh abuse, tobacco use. lives at home with wife.      Medications:    metoprolol succinate ER 25 milliGRAM(s) Oral daily      acetaminophen   Tablet 650 milliGRAM(s) Oral daily  diphenhydrAMINE   Capsule 50 milliGRAM(s) Oral daily        pantoprazole    Tablet 40 milliGRAM(s) Oral before breakfast      dexamethasone  IVPB 40 milliGRAM(s) IV Intermittent daily  dextrose 40% Gel 15 Gram(s) Oral once PRN  dextrose 50% Injectable 12.5 Gram(s) IV Push once  dextrose 50% Injectable 25 Gram(s) IV Push once  dextrose 50% Injectable 25 Gram(s) IV Push once  glucagon  Injectable 1 milliGRAM(s) IntraMuscular once PRN  insulin lispro (HumaLOG) corrective regimen sliding scale   SubCutaneous three times a day before meals  insulin lispro (HumaLOG) corrective regimen sliding scale   SubCutaneous at bedtime  levothyroxine 75 MICROGram(s) Oral daily  simvastatin 40 milliGRAM(s) Oral at bedtime    dextrose 5%. 1000 milliLiter(s) IV Continuous <Continuous>  multivitamin 1 Tablet(s) Oral daily    immune globulin gamma IVPB 75 Gram(s) IV Intermittent daily              ICU Vital Signs Last 24 Hrs  T(C): 36.4 (04 Aug 2018 15:47), Max: 36.6 (04 Aug 2018 14:06)  T(F): 97.6 (04 Aug 2018 15:47), Max: 97.9 (04 Aug 2018 14:06)  HR: 68 (04 Aug 2018 15:47) (53 - 69)  BP: 138/78 (04 Aug 2018 15:04) (138/74 - 179/74)  BP(mean): --  ABP: --  ABP(mean): --  RR: 18 (04 Aug 2018 15:47) (14 - 18)  SpO2: 94% (04 Aug 2018 15:47) (94% - 97%)    Vital Signs Last 24 Hrs  T(C): 36.4 (04 Aug 2018 15:47), Max: 36.6 (04 Aug 2018 14:06)  T(F): 97.6 (04 Aug 2018 15:47), Max: 97.9 (04 Aug 2018 14:06)  HR: 68 (04 Aug 2018 15:47) (53 - 69)  BP: 138/78 (04 Aug 2018 15:04) (138/74 - 179/74)  BP(mean): --  RR: 18 (04 Aug 2018 15:47) (14 - 18)  SpO2: 94% (04 Aug 2018 15:47) (94% - 97%)        I&O's Detail        LABS:                        14.5   5.55  )-----------( <3.0     ( 04 Aug 2018 08:55 )             41.9     08-04    142  |  109<H>  |  24<H>  ----------------------------<  151<H>  4.4   |  25  |  1.10    Ca    8.2<L>      04 Aug 2018 08:55    TPro  6.4  /  Alb  3.2<L>  /  TBili  0.6  /  DBili  x   /  AST  30  /  ALT  24  /  AlkPhos  63  08-04          CAPILLARY BLOOD GLUCOSE      POCT Blood Glucose.: 214 mg/dL (04 Aug 2018 16:40)    PT/INR - ( 04 Aug 2018 08:55 )   PT: 10.3 sec;   INR: 0.95 ratio           Urinalysis Basic - ( 04 Aug 2018 10:32 )    Color: Yellow / Appearance: Clear / S.010 / pH: x  Gluc: x / Ketone: Negative  / Bili: Negative / Urobili: Negative   Blood: x / Protein: Negative / Nitrite: Negative   Leuk Esterase: Negative / RBC: 6-10 /HPF / WBC 0-2   Sq Epi: x / Non Sq Epi: Occasional / Bacteria: Few      CULTURES:      Physical Examination:    GENERAL: No acute distress. resting comfortably in bed.     EYES: Pupils equal, reactive to light.  Symmetric.    EARS, NOSE, THROAT: 2-3 cm x 1 cm closed blood ulcer on right mucosa as well as multiple ~1 mm open ulcers on tongue, not actively bleeding. ; supple neck, no lymphadenopathy; trachea midline    PULM: Clear to auscultation bilaterally, no significant sputum production. No use of accessory respiratory muscles.    CVS: Regular rate and rhythm, no murmurs, rubs, or gallops    GI: Soft, nondistended, nontender, normoactive bowel sounds, no masses, no guarding    EXTREMITIES: No edema. DP and radial pulses 2+ bilaterally.    SKIN: non-blanching petechiae throughout bilateral lower extremities. Warm and well perfused    NEURO: Alert, oriented, interactive, nonfocal    DEVICES: PPM    RADIOLOGY:    EXAM:  XR CHEST PORTABLE URGENT 1V                            PROCEDURE DATE:  2018          INTERPRETATION:  Low platelets.    AP chest. Prior 3/7/2008.    Impression: Left cardiac pacer reidentified in situ. No change heart   mediastinum. No consolidation or effusion.                VETO HUITRON M.D., ATTENDING RADIOLOGIST  This document has been electronically signed. Aug  4 2018  9:34AM

## 2018-08-04 NOTE — H&P ADULT - NSHPPHYSICALEXAM_GEN_ALL_CORE
Physical Exam:  General: Well developed, well nourished, NAD  HEENT: NCAT, PERRLA, EOMI bl, 2x1cm violet hamartoma on right lateral side of mouth, diffuse papules on tongue.  Neurology: A&Ox3, nonfocal,   Respiratory: CTA B/L, No W/R/R  CV: RRR, +S1/S2, no murmurs, rubs or gallops  Abdominal: Soft, NT, ND +BSx4. Petechial rash throughout all 4 quadrants.  Extremities: non blanching petechial rash throughout bilateral lower extremities, no edema.  MSK: Normal ROM, no joint erythema or warmth, no joint swelling

## 2018-08-04 NOTE — H&P ADULT - NSHPREVIEWOFSYSTEMS_GEN_ALL_CORE
Constitutional: denies fever, chills, diaphoresis   HEENT: denies blurry vision  Respiratory: denies SOB, MORENO, cough, sputum production, wheezing, hemoptysis  Cardiovascular: denies CP, palpitations, edema  Gastrointestinal: denies nausea, vomiting, abdominal pain, melena, hematochezia   Genitourinary: denies dysuria, frequency, urgency, hematuria   Skin/Breast: denies rash, itching, epistaxis  Musculoskeletal: denies myalgias, joint swelling, muscle weakness  Neurologic: denies headache, weakness, dizziness, paresthesias, numbness/tingling  Hematology/Oncology: denies bruising, tender or enlarged lymph nodes   ROS negative except as noted above

## 2018-08-04 NOTE — H&P ADULT - PROBLEM SELECTOR PLAN 4
- Type 2 diabetic - HbA1c 6.5 (3/2018)  - Will repeat HbA1c  - Insulin coverage scale.  - Monitor daily fingersticks, goal blood glucose 140-180 in hospital.  - Consistent carbohydrate diet. - Type 2 diabetic - HbA1c 6.5 (3/2018)  - Will repeat HbA1c  - Insulin coverage scale with hypoglycemic protocol  - Consistent carbohydrate diet.

## 2018-08-04 NOTE — H&P ADULT - PMH
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

## 2018-08-04 NOTE — ED PROVIDER NOTE - CHPI ED SYMPTOMS NEG
no fever/no numbness/no pain/no nausea/no tingling/no weakness/no chills/no decreased eating/drinking/no dizziness/no vomiting

## 2018-08-04 NOTE — CONSULT NOTE ADULT - SUBJECTIVE AND OBJECTIVE BOX
Patient is a 84y old  Male who presents with a chief complaint of Bleeding under the skin. (04 Aug 2018 10:51)      HPI:  84 year old male with PMH of hairy cell leukemia diagnosed in 2010 and treated with with 7 days CIVI cladribine, noted this morning to have bleeding from his oral mucosa and noted petichea on his bilateral lower extremities. He has not been exposed to any new medications or any new foods that he recalls. Denies any recent travel.      He denies any blood in the stool or urine; denies any neurologic deficits.  At baseline patient is able to ambulate independently but has a left foot drop which developed following a lumbar laminectomy for spinal stenosis in March 2018. The operation did not have any bleeding complications.    Labs drawn revealed platelet count <3 with normal Hg and WBC count.    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  Aortic stenosis  Kidney stone  Sick sinus syndrome: s/p PPM 2008, 2017  CAD (coronary artery disease): denies any hx of MI or cardiac stents  Lumbar herniated disc  Lumbar radiculopathy  DM (diabetes mellitus): type 2  HLD (hyperlipidemia)  HTN (hypertension)  Hypothyroid  Spinal stenosis  H/O hernia repair  Artificial pacemaker: [employer] [id number] model [id number] generater change 2017      SOCIAL HISTORY:  lives at home with wife  denies ETOH   denies tobacco use    FAMILY HISTORY:  No pertinent family history in first degree relatives      MEDICATIONS  (STANDING):  acetaminophen   Tablet 650 milliGRAM(s) Oral daily  dextrose 5%. 1000 milliLiter(s) (50 mL/Hr) IV Continuous <Continuous>  diphenhydrAMINE   Capsule 50 milliGRAM(s) Oral daily  immune globulin gamma IVPB 75 Gram(s) IV Intermittent daily  insulin lispro (HumaLOG) corrective regimen sliding scale   SubCutaneous three times a day before meals  insulin lispro (HumaLOG) corrective regimen sliding scale   SubCutaneous at bedtime  levothyroxine 75 MICROGram(s) Oral daily  metoprolol succinate ER 25 milliGRAM(s) Oral daily  multivitamin 1 Tablet(s) Oral daily  simvastatin 40 milliGRAM(s) Oral at bedtime    MEDICATIONS  (PRN):  dextrose 40% Gel 15 Gram(s) Oral once PRN Blood Glucose LESS THAN 70 milliGRAM(s)/deciliter  glucagon  Injectable 1 milliGRAM(s) IntraMuscular once PRN Glucose LESS THAN 70 milligrams/deciliter      Allergies    No Known Allergies      Vital Signs Last 24 Hrs  T(C): 36.2 (04 Aug 2018 07:59), Max: 36.2 (04 Aug 2018 07:59)  T(F): 97.1 (04 Aug 2018 07:59), Max: 97.1 (04 Aug 2018 07:59)  HR: 53 (04 Aug 2018 07:59) (53 - 53)  BP: 179/74 (04 Aug 2018 07:59) (179/74 - 179/74)  RR: 14 (04 Aug 2018 07:59) (14 - 14)  SpO2: 97% (04 Aug 2018 07:59) (97% - 97%)    PHYSICAL EXAM  General: adult in NAD  HEENT: + blood blisters on tongue; + large 2cm blood blister on right inner cheek  Neck: supple  CV: normal S1S2 with no murmur rubs or gallops  Lungs: clear to auscultation, no wheezes, no rhales  Abdomen: soft non-tender non-distended, no hepato/splenomegaly  Ext: no clubbing cyanosis or edema  Skin: + petichiae on bilateral LEs  Neuro: alert and oriented X3 no focal deficits      LABS:    CBC Full  -  ( 04 Aug 2018 08:55 )  WBC Count : 5.55 K/uL  Hemoglobin : 14.5 g/dL  Hematocrit : 41.9 %  Platelet Count - Automated : <3.0 K/uL  Mean Cell Volume : 88.0 fl  Mean Cell Hemoglobin : 30.5 pg  Mean Cell Hemoglobin Concentration : 34.6 gm/dL  Auto Neutrophil # : 3.73 K/uL  Auto Lymphocyte # : 1.01 K/uL  Auto Monocyte # : 0.56 K/uL  Auto Eosinophil # : 0.19 K/uL  Auto Basophil # : 0.03 K/uL  Auto Neutrophil % : 67.3 %  Auto Lymphocyte % : 18.2 %  Auto Monocyte % : 10.1 %  Auto Eosinophil % : 3.4 %  Auto Basophil % : 0.5 %    08-04    142  |  109<H>  |  24<H>  ----------------------------<  151<H>  4.4   |  25  |  1.10    Ca    8.2<L>      04 Aug 2018 08:55    TPro  6.4  /  Alb  3.2<L>  /  TBili  0.6  /  DBili  x   /  AST  30  /  ALT  24  /  AlkPhos  63  08-04    PT/INR - ( 04 Aug 2018 08:55 )   PT: 10.3 sec;   INR: 0.95 ratio      BLOOD SMEAR INTERPRETATION:    * RBC - normocytic, normochromic, no anisiocytosis or poikiolocytosis  * WBC - neutrophils with normal morphology, small mature lymphs, no evidence of left shift  * Platelets - rare platelet seen

## 2018-08-04 NOTE — H&P ADULT - HISTORY OF PRESENT ILLNESS
84 male presents to ER with wife states last night he noted a pimple like mass in his right inner cheek, this morning patient went to brush teeth and noted his mouth was full of dried blood, then noted bilateral lower extremity petechia and came to the ER. Patient states he feels well, no shortness of breath no chest pain, no fever, no vomiting.    In the ED: Vitals: T - 97.1, HR 53, /74, RR 14, SpO2 - 97% on RA, Labs significant for platelet count of 3.    EKG: Sinus rhythm with 1st degree AV block with possible premature atrial complexes with aberrant conduction    CXR: Left cardiac pacer reidentified in situ    Patient was given 1L NS, started on IV dexamethoasone, and given one unit of platelets as per Dr. Holly. Heme/onc Dr. Reid consulted. 84 year old male with PMH     In the ED: Vitals: T - 97.1, HR 53, /74, RR 14, SpO2 - 97% on RA, Labs significant for platelet count of 3.    EKG: Sinus rhythm with 1st degree AV block with possible premature atrial complexes with aberrant conduction    CXR: Left cardiac pacer reidentified in situ    Patient was given 1L NS 84 year old male with PMH of hairy cell leukemia (last treated with chemo 8 years ago), s/p PPM 2017, HTN, hypothyroidism and HLD presenting with a chief complaint of papule and bleeding from the mouth and petechial rash in the bilateral lower extremities. Patient states last night he noticed a small right papule on the right side of his mouth. In the morning, the patient noticed blood on his shirt and a small amount of blood coming from the right side of his mouth as well as a petechial rash along both of his lower extremities prompting him to come to the ED. The patient denies any pain, numbness or tingling in his mouth or legs and does not recall biting the side of his cheek. Patient does not endorse bleeding from any other area of the body. Patient currently denies any light headedness, difficulty swallowing, chest pain, dyspnea, abdominal pain, n/v, hematuria or weakness. At baseline patient is able to ambulate independently but has a left foot drop which developed following surgery for spinal stenosis. Patient is able to perform all ADLs independently and is full code status.    In the ED: Vitals: T - 97.1, HR 53, /74, RR 14, SpO2 - 97% on RA, Labs significant for platelet count of <3.    EKG: Sinus rhythm with 1st degree AV block with possible premature atrial complexes with aberrant conduction    CXR: Left cardiac pacer reidentified in situ    Patient was given 1L NS 84 year old male with PMH of hairy cell leukemia (last treated with chemo 8 years ago), s/p PPM 2017, Diabetes type 2, HTN, hypothyroidism and HLD presenting with a chief complaint of papule and bleeding from the mouth and petechial rash in the bilateral lower extremities. Patient states last night he noticed a small right papule on the right side of his mouth. In the morning, the patient noticed blood on his shirt and a small amount of blood coming from the right side of his mouth as well as a petechial rash along both of his lower extremities prompting him to come to the ED. The patient denies any pain, numbness or tingling in his mouth or legs and does not recall biting the side of his cheek. Patient does not endorse bleeding from any other area of the body. Patient currently denies any light headedness, difficulty swallowing, chest pain, dyspnea, abdominal pain, n/v, hematuria or weakness. At baseline patient is able to ambulate independently but has a left foot drop which developed following surgery for spinal stenosis. Patient is able to perform all ADLs independently and is full code status.    In the ED: Vitals: T - 97.1, HR 53, /74, RR 14, SpO2 - 97% on RA, Labs significant for platelet count of <3.    EKG: Sinus rhythm with 1st degree AV block with possible premature atrial complexes with aberrant conduction    CXR: Left cardiac pacer reidentified in situ    Patient was given 1L NS. 84 year old male with PMH of hairy cell leukemia (last treated with chemo 8 years ago), s/p PPM 2017, Diabetes type 2, HTN, hypothyroidism and HLD presenting with a chief complaint of papule and bleeding from the mouth and petechial rash in the bilateral lower extremities. Patient states last night he noticed a small right papule on the right side of his mouth. In the morning, the patient noticed blood on his shirt and a small amount of blood coming from the right side of his mouth as well as a petechial rash along both of his lower extremities prompting him to come to the ED. The patient denies any pain, numbness or tingling in his mouth or legs and does not recall biting the side of his cheek. Patient does not endorse bleeding from any other area of the body. Patient currently denies any light headedness, difficulty swallowing, chest pain, dyspnea, abdominal pain, n/v, hematuria or weakness. At baseline patient is able to ambulate independently but has a left foot drop which developed following a lumbar laminectomy for spinal stenosis in March. Patient is able to perform all ADLs independently and is full code status.    In the ED: Vitals: T - 97.1, HR 53, /74, RR 14, SpO2 - 97% on RA, Labs significant for platelet count of <3.    EKG: Sinus rhythm with 1st degree AV block with possible premature atrial complexes with aberrant conduction    CXR: Left cardiac pacer reidentified in situ    Patient was given 1L NS.

## 2018-08-04 NOTE — H&P ADULT - PROBLEM SELECTOR PLAN 1
- Current platelet level <3, likely secondary to remission of hairy cell leukemia  - Heme/onc consulted (Dr. Reid)  - Start 40 mg IV dexamethasone daily for 4 days  - Start IVIG 75 grams/day for 2 days, premedicate with tylenol 650 and benadryl 50 30 minutes prior to each dose.  - Follow up additional heme/onc recommendations - acute  - Current platelet level <3, likely secondary to remission of hairy cell leukemia  - Heme/onc consulted (Dr. Reid)  - Start 40 mg IV dexamethasone daily for 4 days, day 1 of 4  - Start IVIG 75 grams/day for 2 days, premedicate with tylenol 650 and benadryl 50 30 minutes prior to each dose.  - Follow up additional heme/onc recommendations - acute  - Current platelet level <3, likely secondary to remission of hairy cell leukemia  - Heme/onc consulted (Dr. Reid)  - Start 40 mg IV dexamethasone daily for 4 days, day 1 of 4  - d/c PPI once steroids stopped.  - Start IVIG 75 grams/day for 2 days, premedicate with tylenol 650 and benadryl 50 30 minutes prior to each dose.  - Follow up additional heme/onc recommendations

## 2018-08-04 NOTE — ED ADULT NURSE NOTE - OBJECTIVE STATEMENT
Patient presents with report that he had a "pimple" in his mouth last night, and this morning he has red spots on his tongue and red spots on his legs.

## 2018-08-05 DIAGNOSIS — I49.5 SICK SINUS SYNDROME: ICD-10-CM

## 2018-08-05 LAB
ANION GAP SERPL CALC-SCNC: 8 MMOL/L — SIGNIFICANT CHANGE UP (ref 5–17)
BASOPHILS # BLD AUTO: 0 K/UL — SIGNIFICANT CHANGE UP (ref 0–0.2)
BASOPHILS NFR BLD AUTO: 0 % — SIGNIFICANT CHANGE UP (ref 0–2)
BUN SERPL-MCNC: 26 MG/DL — HIGH (ref 7–23)
CALCIUM SERPL-MCNC: 8.2 MG/DL — LOW (ref 8.5–10.1)
CHLORIDE SERPL-SCNC: 108 MMOL/L — SIGNIFICANT CHANGE UP (ref 96–108)
CO2 SERPL-SCNC: 24 MMOL/L — SIGNIFICANT CHANGE UP (ref 22–31)
CREAT SERPL-MCNC: 0.98 MG/DL — SIGNIFICANT CHANGE UP (ref 0.5–1.3)
EOSINOPHIL # BLD AUTO: 0 K/UL — SIGNIFICANT CHANGE UP (ref 0–0.5)
EOSINOPHIL NFR BLD AUTO: 0 % — SIGNIFICANT CHANGE UP (ref 0–6)
GLUCOSE SERPL-MCNC: 181 MG/DL — HIGH (ref 70–99)
HBA1C BLD-MCNC: 6.5 % — HIGH (ref 4–5.6)
HCT VFR BLD CALC: 39.7 % — SIGNIFICANT CHANGE UP (ref 39–50)
HGB BLD-MCNC: 13.7 G/DL — SIGNIFICANT CHANGE UP (ref 13–17)
IMM GRANULOCYTES NFR BLD AUTO: 0.5 % — SIGNIFICANT CHANGE UP (ref 0–1.5)
LYMPHOCYTES # BLD AUTO: 0.6 K/UL — LOW (ref 1–3.3)
LYMPHOCYTES # BLD AUTO: 7.5 % — LOW (ref 13–44)
MCHC RBC-ENTMCNC: 30.6 PG — SIGNIFICANT CHANGE UP (ref 27–34)
MCHC RBC-ENTMCNC: 34.5 GM/DL — SIGNIFICANT CHANGE UP (ref 32–36)
MCV RBC AUTO: 88.8 FL — SIGNIFICANT CHANGE UP (ref 80–100)
MONOCYTES # BLD AUTO: 0.4 K/UL — SIGNIFICANT CHANGE UP (ref 0–0.9)
MONOCYTES NFR BLD AUTO: 5 % — SIGNIFICANT CHANGE UP (ref 2–14)
NEUTROPHILS # BLD AUTO: 6.92 K/UL — SIGNIFICANT CHANGE UP (ref 1.8–7.4)
NEUTROPHILS NFR BLD AUTO: 87 % — HIGH (ref 43–77)
PLATELET # BLD AUTO: 5 K/UL — CRITICAL LOW (ref 150–400)
POTASSIUM SERPL-MCNC: 4.3 MMOL/L — SIGNIFICANT CHANGE UP (ref 3.5–5.3)
POTASSIUM SERPL-SCNC: 4.3 MMOL/L — SIGNIFICANT CHANGE UP (ref 3.5–5.3)
RBC # BLD: 4.47 M/UL — SIGNIFICANT CHANGE UP (ref 4.2–5.8)
RBC # FLD: 12.7 % — SIGNIFICANT CHANGE UP (ref 10.3–14.5)
SODIUM SERPL-SCNC: 140 MMOL/L — SIGNIFICANT CHANGE UP (ref 135–145)
WBC # BLD: 7.96 K/UL — SIGNIFICANT CHANGE UP (ref 3.8–10.5)
WBC # FLD AUTO: 7.96 K/UL — SIGNIFICANT CHANGE UP (ref 3.8–10.5)

## 2018-08-05 PROCEDURE — 99233 SBSQ HOSP IP/OBS HIGH 50: CPT

## 2018-08-05 RX ORDER — DOCUSATE SODIUM 100 MG
100 CAPSULE ORAL
Qty: 0 | Refills: 0 | Status: DISCONTINUED | OUTPATIENT
Start: 2018-08-05 | End: 2018-08-12

## 2018-08-05 RX ORDER — DIPHENHYDRAMINE HCL 50 MG
25 CAPSULE ORAL AT BEDTIME
Qty: 0 | Refills: 0 | Status: DISCONTINUED | OUTPATIENT
Start: 2018-08-05 | End: 2018-08-12

## 2018-08-05 RX ADMIN — Medication 650 MILLIGRAM(S): at 11:46

## 2018-08-05 RX ADMIN — Medication 1 TABLET(S): at 11:47

## 2018-08-05 RX ADMIN — Medication 25 MILLIGRAM(S): at 21:40

## 2018-08-05 RX ADMIN — Medication 125 GRAM(S): at 12:56

## 2018-08-05 RX ADMIN — Medication 75 MICROGRAM(S): at 05:20

## 2018-08-05 RX ADMIN — Medication 25 MILLIGRAM(S): at 05:20

## 2018-08-05 RX ADMIN — PANTOPRAZOLE SODIUM 40 MILLIGRAM(S): 20 TABLET, DELAYED RELEASE ORAL at 05:20

## 2018-08-05 RX ADMIN — Medication 120 MILLIGRAM(S): at 17:00

## 2018-08-05 RX ADMIN — Medication 1: at 08:04

## 2018-08-05 RX ADMIN — Medication 100 MILLIGRAM(S): at 17:10

## 2018-08-05 RX ADMIN — Medication 1: at 11:56

## 2018-08-05 RX ADMIN — Medication 50 MILLIGRAM(S): at 11:46

## 2018-08-05 RX ADMIN — SIMVASTATIN 40 MILLIGRAM(S): 20 TABLET, FILM COATED ORAL at 21:40

## 2018-08-05 NOTE — PROGRESS NOTE ADULT - PROBLEM SELECTOR PLAN 4
- Type 2 diabetic - HbA1c 6.5%  - Insulin coverage scale with hypoglycemic protocol  - Consistent carbohydrate diet.

## 2018-08-05 NOTE — PROGRESS NOTE ADULT - SUBJECTIVE AND OBJECTIVE BOX
Patient seen and examined;  Chart reviewed and events noted;   wife at bedside  reports less bleeding this morning from oral mucosa  s/p day 1 of IVIG and Dex    MEDICATIONS  (STANDING):  acetaminophen   Tablet 650 milliGRAM(s) Oral daily  dexamethasone  IVPB 40 milliGRAM(s) IV Intermittent daily  dextrose 5%. 1000 milliLiter(s) (50 mL/Hr) IV Continuous <Continuous>  diphenhydrAMINE   Capsule 50 milliGRAM(s) Oral daily  immune globulin gamma IVPB 75 Gram(s) IV Intermittent daily  insulin lispro (HumaLOG) corrective regimen sliding scale   SubCutaneous three times a day before meals  insulin lispro (HumaLOG) corrective regimen sliding scale   SubCutaneous at bedtime  levothyroxine 75 MICROGram(s) Oral daily  metoprolol succinate ER 25 milliGRAM(s) Oral daily  multivitamin 1 Tablet(s) Oral daily  pantoprazole    Tablet 40 milliGRAM(s) Oral before breakfast  simvastatin 40 milliGRAM(s) Oral at bedtime    MEDICATIONS  (PRN):  dextrose 40% Gel 15 Gram(s) Oral once PRN Blood Glucose LESS THAN 70 milliGRAM(s)/deciliter  glucagon  Injectable 1 milliGRAM(s) IntraMuscular once PRN Glucose LESS THAN 70 milligrams/deciliter      Vital Signs Last 24 Hrs  T(C): 36.5 (05 Aug 2018 07:45), Max: 37.1 (05 Aug 2018 04:34)  T(F): 97.7 (05 Aug 2018 07:45), Max: 98.8 (05 Aug 2018 04:34)  HR: 73 (05 Aug 2018 07:45) (63 - 78)  BP: 125/73 (05 Aug 2018 07:45) (125/73 - 171/82)  RR: 19 (05 Aug 2018 07:45) (16 - 19)  SpO2: 95% (05 Aug 2018 07:45) (93% - 97%)    PHYSICAL EXAM  General: adult in NAD  HEENT: + oral mucosa with blood blisters less prominent; no active bleeding  Neck: supple  CV: normal S1S2 with no murmur rubs or gallops  Lungs: clear to auscultation, no wheezes, no rhales  Abdomen: soft non-tender non-distended, no hepato/splenomegaly  Ext: no clubbing cyanosis or edema  Skin: petichiae on bilaterl LE's unchanged  Neuro: alert and oriented X3 no focal deficits      LABS:                        13.7   7.96  )-----------( 5        ( 05 Aug 2018 06:40 )             39.7     Platelet Count - Automated: 5 K/uL (08-05 @ 06:40)  Platelet Count - Automated: <3.0 K/uL (08-04 @ 08:55)    08-05    140  |  108  |  26<H>  ----------------------------<  181<H>  4.3   |  24  |  0.98    Ca    8.2<L>      05 Aug 2018 06:40    TPro  6.4  /  Alb  3.2<L>  /  TBili  0.6  /  DBili  x   /  AST  30  /  ALT  24  /  AlkPhos  63  08-04    PT/INR - ( 04 Aug 2018 08:55 )   PT: 10.3 sec;   INR: 0.95 ratio

## 2018-08-05 NOTE — PROGRESS NOTE ADULT - ASSESSMENT
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

## 2018-08-05 NOTE — PROGRESS NOTE ADULT - SUBJECTIVE AND OBJECTIVE BOX
Patient is a 84y old  Male who presents with a chief complaint of mucosal bleeding in the mouth and rash. (04 Aug 2018 10:51)      INTERVAL HPI/OVERNIGHT EVENTS: Pt states he feels well and that the gum bleeding has resolved. The petechial rash, which is most prominent in his legs, is slowly changing color. States he could not sleep at all last night and is requesting Benadryl which has worked for him. Pt denies fever, chills, melena, hematochezia, hematuria.     MEDICATIONS  (STANDING):  acetaminophen   Tablet 650 milliGRAM(s) Oral daily  dexamethasone  IVPB 40 milliGRAM(s) IV Intermittent daily  dextrose 5%. 1000 milliLiter(s) (50 mL/Hr) IV Continuous <Continuous>  dextrose 50% Injectable 12.5 Gram(s) IV Push once  dextrose 50% Injectable 25 Gram(s) IV Push once  dextrose 50% Injectable 25 Gram(s) IV Push once  diphenhydrAMINE   Capsule 50 milliGRAM(s) Oral daily  docusate sodium 100 milliGRAM(s) Oral two times a day  immune globulin gamma IVPB 75 Gram(s) IV Intermittent daily  insulin lispro (HumaLOG) corrective regimen sliding scale   SubCutaneous three times a day before meals  insulin lispro (HumaLOG) corrective regimen sliding scale   SubCutaneous at bedtime  levothyroxine 75 MICROGram(s) Oral daily  metoprolol succinate ER 25 milliGRAM(s) Oral daily  multivitamin 1 Tablet(s) Oral daily  pantoprazole    Tablet 40 milliGRAM(s) Oral before breakfast  simvastatin 40 milliGRAM(s) Oral at bedtime    MEDICATIONS  (PRN):  dextrose 40% Gel 15 Gram(s) Oral once PRN Blood Glucose LESS THAN 70 milliGRAM(s)/deciliter  diphenhydrAMINE   Capsule 25 milliGRAM(s) Oral at bedtime PRN Insomnia  glucagon  Injectable 1 milliGRAM(s) IntraMuscular once PRN Glucose LESS THAN 70 milligrams/deciliter      Allergies    No Known Allergies    Intolerances        REVIEW OF SYSTEMS:  CONSTITUTIONAL: No fever or chills  HEENT:  gum bleeding has resolved today. No headache, no sore throat  RESPIRATORY: No cough, wheezing, or shortness of breath  CARDIOVASCULAR: No chest pain, palpitations, or leg swelling  GASTROINTESTINAL: No abd pain, nausea, vomiting, or diarrhea  GENITOURINARY: No dysuria, frequency, or hematuria  NEUROLOGICAL: no focal weakness or dizziness  MUSCULOSKELETAL: no myalgias   SKIN: rash worst on legs, non-pruritic     Vital Signs Last 24 Hrs  T(C): 36.6 (05 Aug 2018 19:33), Max: 37.2 (05 Aug 2018 15:14)  T(F): 97.8 (05 Aug 2018 19:33), Max: 98.9 (05 Aug 2018 15:14)  HR: 66 (05 Aug 2018 19:33) (65 - 78)  BP: 166/80 (05 Aug 2018 19:33) (125/73 - 166/80)  BP(mean): --  RR: 18 (05 Aug 2018 19:33) (18 - 19)  SpO2: 96% (05 Aug 2018 19:33) (93% - 97%)    PHYSICAL EXAM:  GENERAL: NAD  HEENT:  anicteric, moist mucous membranes, no blood in the mouth  CHEST/LUNG:  CTA b/l, no rales, wheezes, or rhonchi  HEART:  RRR, S1, S2  ABDOMEN:  BS+, soft, nontender, nondistended  EXTREMITIES: no edema, cyanosis, or calf tenderness  NERVOUS SYSTEM: AA&Ox3  SKIN: petechial rash worst over the legs    LABS:                        13.7   7.96  )-----------( 5        ( 05 Aug 2018 06:40 )             39.7     CBC Full  -  ( 05 Aug 2018 06:40 )  WBC Count : 7.96 K/uL  Hemoglobin : 13.7 g/dL  Hematocrit : 39.7 %  Platelet Count - Automated : 5 K/uL  Mean Cell Volume : 88.8 fl  Mean Cell Hemoglobin : 30.6 pg  Mean Cell Hemoglobin Concentration : 34.5 gm/dL  Auto Neutrophil # : 6.92 K/uL  Auto Lymphocyte # : 0.60 K/uL  Auto Monocyte # : 0.40 K/uL  Auto Eosinophil # : 0.00 K/uL  Auto Basophil # : 0.00 K/uL  Auto Neutrophil % : 87.0 %  Auto Lymphocyte % : 7.5 %  Auto Monocyte % : 5.0 %  Auto Eosinophil % : 0.0 %  Auto Basophil % : 0.0 %    05 Aug 2018 06:40    140    |  108    |  26     ----------------------------<  181    4.3     |  24     |  0.98     Ca    8.2        05 Aug 2018 06:40      PT/INR - ( 04 Aug 2018 08:55 )   PT: 10.3 sec;   INR: 0.95 ratio           Urinalysis Basic - ( 04 Aug 2018 10:32 )    Color: Yellow / Appearance: Clear / S.010 / pH: x  Gluc: x / Ketone: Negative  / Bili: Negative / Urobili: Negative   Blood: x / Protein: Negative / Nitrite: Negative   Leuk Esterase: Negative / RBC: 6-10 /HPF / WBC 0-2   Sq Epi: x / Non Sq Epi: Occasional / Bacteria: Few      CAPILLARY BLOOD GLUCOSE      POCT Blood Glucose.: 235 mg/dL (05 Aug 2018 21:25)  POCT Blood Glucose.: 133 mg/dL (05 Aug 2018 16:25)  POCT Blood Glucose.: 193 mg/dL (05 Aug 2018 11:34)  POCT Blood Glucose.: 173 mg/dL (05 Aug 2018 07:31)          RADIOLOGY & ADDITIONAL TESTS:  CXR: Left cardiac pacer reidentified in situ. No change heart mediastinum. No consolidation or effusion.    Personally reviewed.     Consultant(s) Notes Reviewed:  [x] YES  [ ] NO Patient is a 84y old  Male who presents with a chief complaint of mucosal bleeding in the mouth and rash. (04 Aug 2018 10:51)    INTERVAL HPI/OVERNIGHT EVENTS: Pt states he feels well and that the gum bleeding has resolved. The petechial rash, which is most prominent in his legs, is slowly changing color. States he could not sleep at all last night and is requesting Benadryl which has worked for him. Pt denies fever, chills, melena, hematochezia, hematuria.     MEDICATIONS  (STANDING):  acetaminophen   Tablet 650 milliGRAM(s) Oral daily  dexamethasone  IVPB 40 milliGRAM(s) IV Intermittent daily  dextrose 5%. 1000 milliLiter(s) (50 mL/Hr) IV Continuous <Continuous>  dextrose 50% Injectable 12.5 Gram(s) IV Push once  dextrose 50% Injectable 25 Gram(s) IV Push once  dextrose 50% Injectable 25 Gram(s) IV Push once  diphenhydrAMINE   Capsule 50 milliGRAM(s) Oral daily  docusate sodium 100 milliGRAM(s) Oral two times a day  immune globulin gamma IVPB 75 Gram(s) IV Intermittent daily  insulin lispro (HumaLOG) corrective regimen sliding scale   SubCutaneous three times a day before meals  insulin lispro (HumaLOG) corrective regimen sliding scale   SubCutaneous at bedtime  levothyroxine 75 MICROGram(s) Oral daily  metoprolol succinate ER 25 milliGRAM(s) Oral daily  multivitamin 1 Tablet(s) Oral daily  pantoprazole    Tablet 40 milliGRAM(s) Oral before breakfast  simvastatin 40 milliGRAM(s) Oral at bedtime    MEDICATIONS  (PRN):  dextrose 40% Gel 15 Gram(s) Oral once PRN Blood Glucose LESS THAN 70 milliGRAM(s)/deciliter  diphenhydrAMINE   Capsule 25 milliGRAM(s) Oral at bedtime PRN Insomnia  glucagon  Injectable 1 milliGRAM(s) IntraMuscular once PRN Glucose LESS THAN 70 milligrams/deciliter      Allergies    No Known Allergies    Intolerances        REVIEW OF SYSTEMS:  CONSTITUTIONAL: No fever or chills  HEENT:  gum bleeding has resolved today. No headache, no sore throat  RESPIRATORY: No cough, wheezing, or shortness of breath  CARDIOVASCULAR: No chest pain, palpitations, or leg swelling  GASTROINTESTINAL: No abd pain, nausea, vomiting, or diarrhea  GENITOURINARY: No dysuria, frequency, or hematuria  NEUROLOGICAL: no focal weakness or dizziness  MUSCULOSKELETAL: no myalgias   SKIN: rash worst on legs, non-pruritic     Vital Signs Last 24 Hrs  T(C): 36.6 (05 Aug 2018 19:33), Max: 37.2 (05 Aug 2018 15:14)  T(F): 97.8 (05 Aug 2018 19:33), Max: 98.9 (05 Aug 2018 15:14)  HR: 66 (05 Aug 2018 19:33) (65 - 78)  BP: 166/80 (05 Aug 2018 19:33) (125/73 - 166/80)  BP(mean): --  RR: 18 (05 Aug 2018 19:33) (18 - 19)  SpO2: 96% (05 Aug 2018 19:33) (93% - 97%)    PHYSICAL EXAM:  GENERAL: NAD  HEENT:  anicteric, moist mucous membranes, no blood in the mouth  CHEST/LUNG:  CTA b/l, no rales, wheezes, or rhonchi  HEART:  RRR, S1, S2  ABDOMEN:  BS+, soft, nontender, nondistended  EXTREMITIES: no edema, cyanosis, or calf tenderness  NERVOUS SYSTEM: AA&Ox3  SKIN: petechial rash worst over the legs    LABS:                        13.7   7.96  )-----------( 5        ( 05 Aug 2018 06:40 )             39.7     CBC Full  -  ( 05 Aug 2018 06:40 )  WBC Count : 7.96 K/uL  Hemoglobin : 13.7 g/dL  Hematocrit : 39.7 %  Platelet Count - Automated : 5 K/uL  Mean Cell Volume : 88.8 fl  Mean Cell Hemoglobin : 30.6 pg  Mean Cell Hemoglobin Concentration : 34.5 gm/dL  Auto Neutrophil # : 6.92 K/uL  Auto Lymphocyte # : 0.60 K/uL  Auto Monocyte # : 0.40 K/uL  Auto Eosinophil # : 0.00 K/uL  Auto Basophil # : 0.00 K/uL  Auto Neutrophil % : 87.0 %  Auto Lymphocyte % : 7.5 %  Auto Monocyte % : 5.0 %  Auto Eosinophil % : 0.0 %  Auto Basophil % : 0.0 %    05 Aug 2018 06:40    140    |  108    |  26     ----------------------------<  181    4.3     |  24     |  0.98     Ca    8.2        05 Aug 2018 06:40      PT/INR - ( 04 Aug 2018 08:55 )   PT: 10.3 sec;   INR: 0.95 ratio           Urinalysis Basic - ( 04 Aug 2018 10:32 )    Color: Yellow / Appearance: Clear / S.010 / pH: x  Gluc: x / Ketone: Negative  / Bili: Negative / Urobili: Negative   Blood: x / Protein: Negative / Nitrite: Negative   Leuk Esterase: Negative / RBC: 6-10 /HPF / WBC 0-2   Sq Epi: x / Non Sq Epi: Occasional / Bacteria: Few      CAPILLARY BLOOD GLUCOSE      POCT Blood Glucose.: 235 mg/dL (05 Aug 2018 21:25)  POCT Blood Glucose.: 133 mg/dL (05 Aug 2018 16:25)  POCT Blood Glucose.: 193 mg/dL (05 Aug 2018 11:34)  POCT Blood Glucose.: 173 mg/dL (05 Aug 2018 07:31)          RADIOLOGY & ADDITIONAL TESTS:  CXR: Left cardiac pacer reidentified in situ. No change heart mediastinum. No consolidation or effusion.    Personally reviewed.     Consultant(s) Notes Reviewed:  [x] YES  [ ] NO

## 2018-08-05 NOTE — PROGRESS NOTE ADULT - PROBLEM SELECTOR PLAN 1
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   No glaring etiology at this point.  heme recs appreciated  c/w IVIg and pulse dose steroids  monitor platelet count   avoid all blood thinners for now

## 2018-08-05 NOTE — PROGRESS NOTE ADULT - ASSESSMENT
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 Daryl Allred, admitted on 8/4/18 with 1 day history of petichial rash on LEs and bleeding from oral muco-cutaneous tissue; platelet count <3 documented at time of admission and confirmed with review of peripheral blood smear with completely normal WBC and Hg level; working diagnosis of ITP  - no role for transfusion of platelets as patient does not have life threatening bleeding  - continue Decadron 40mg IV X4 days (today is day 2 out of 4)  - continue IVIG 1g/kg IV (75 gram); today is day 2 out of 2  - although no dramatic rise in Platelet count, clinically much improved  - hold Aspirin until platelet count >50K  - would monitor blood glucose while on high dose pulse decadron; on sliding scale insulin   - continue with PPI  - follow CBC daily

## 2018-08-06 LAB
ANION GAP SERPL CALC-SCNC: 11 MMOL/L — SIGNIFICANT CHANGE UP (ref 5–17)
BUN SERPL-MCNC: 27 MG/DL — HIGH (ref 7–23)
CALCIUM SERPL-MCNC: 8.7 MG/DL — SIGNIFICANT CHANGE UP (ref 8.5–10.1)
CHLORIDE SERPL-SCNC: 103 MMOL/L — SIGNIFICANT CHANGE UP (ref 96–108)
CO2 SERPL-SCNC: 25 MMOL/L — SIGNIFICANT CHANGE UP (ref 22–31)
CREAT SERPL-MCNC: 1.2 MG/DL — SIGNIFICANT CHANGE UP (ref 0.5–1.3)
GLUCOSE SERPL-MCNC: 189 MG/DL — HIGH (ref 70–99)
HCT VFR BLD CALC: 40.8 % — SIGNIFICANT CHANGE UP (ref 39–50)
HGB BLD-MCNC: 13.9 G/DL — SIGNIFICANT CHANGE UP (ref 13–17)
MCHC RBC-ENTMCNC: 30.3 PG — SIGNIFICANT CHANGE UP (ref 27–34)
MCHC RBC-ENTMCNC: 34.1 GM/DL — SIGNIFICANT CHANGE UP (ref 32–36)
MCV RBC AUTO: 88.9 FL — SIGNIFICANT CHANGE UP (ref 80–100)
NRBC # BLD: 0 /100 WBCS — SIGNIFICANT CHANGE UP (ref 0–0)
PLATELET # BLD AUTO: 15 K/UL — CRITICAL LOW (ref 150–400)
POTASSIUM SERPL-MCNC: 3.9 MMOL/L — SIGNIFICANT CHANGE UP (ref 3.5–5.3)
POTASSIUM SERPL-SCNC: 3.9 MMOL/L — SIGNIFICANT CHANGE UP (ref 3.5–5.3)
RBC # BLD: 4.59 M/UL — SIGNIFICANT CHANGE UP (ref 4.2–5.8)
RBC # FLD: 13.2 % — SIGNIFICANT CHANGE UP (ref 10.3–14.5)
SODIUM SERPL-SCNC: 139 MMOL/L — SIGNIFICANT CHANGE UP (ref 135–145)
WBC # BLD: 9.87 K/UL — SIGNIFICANT CHANGE UP (ref 3.8–10.5)
WBC # FLD AUTO: 9.87 K/UL — SIGNIFICANT CHANGE UP (ref 3.8–10.5)

## 2018-08-06 PROCEDURE — 99233 SBSQ HOSP IP/OBS HIGH 50: CPT | Mod: GC

## 2018-08-06 RX ADMIN — Medication 100 MILLIGRAM(S): at 07:13

## 2018-08-06 RX ADMIN — SIMVASTATIN 40 MILLIGRAM(S): 20 TABLET, FILM COATED ORAL at 21:29

## 2018-08-06 RX ADMIN — Medication 100 MILLIGRAM(S): at 17:05

## 2018-08-06 RX ADMIN — Medication 1: at 07:53

## 2018-08-06 RX ADMIN — Medication 25 MILLIGRAM(S): at 21:29

## 2018-08-06 RX ADMIN — Medication 25 MILLIGRAM(S): at 07:14

## 2018-08-06 RX ADMIN — Medication 2: at 17:05

## 2018-08-06 RX ADMIN — Medication 1 TABLET(S): at 12:04

## 2018-08-06 RX ADMIN — Medication 1: at 12:04

## 2018-08-06 RX ADMIN — PANTOPRAZOLE SODIUM 40 MILLIGRAM(S): 20 TABLET, DELAYED RELEASE ORAL at 07:14

## 2018-08-06 RX ADMIN — Medication 120 MILLIGRAM(S): at 07:13

## 2018-08-06 RX ADMIN — Medication 75 MICROGRAM(S): at 07:13

## 2018-08-06 NOTE — DISCHARGE NOTE ADULT - CARE PROVIDER_API CALL
Daryl Allred), Internal Medicine; Medical Oncology  [address] [place] [address]  Phone: [phone]  Fax: [phone]    Lele Claire), Internal Medicine  [address] [place] [address]  [address]  [place] [address]  Phone: [phone]  Fax: [phone]    Joel Lemus), Cardiology; Internal Medicine  [address] [facility]  [address]  [place] [address]  Phone: [phone]  Fax: [phone]

## 2018-08-06 NOTE — DISCHARGE NOTE ADULT - CARE PLAN
Principal Discharge DX:	Thrombocytopenia  Goal:	Improving  Assessment and plan of treatment:	During your stay you were found to have a platelet count of 3 that improved with IVIG and steroids. Please follow up with your hematologist/oncologist within the next week to monitor your platelet count.  Secondary Diagnosis:	Oral lesion  Assessment and plan of treatment:	Your oral lesion is no longer bleeding and has reduced in size and developed likely due to your decreased platelet count. Please follow up with your hematologist/oncologist within the next week to monitor your platelet count.  Secondary Diagnosis:	Sick sinus syndrome  Assessment and plan of treatment:	Please follow up with your cardiologist should you start to feel palpitations or believe your pacemaker may not be functioning.  Secondary Diagnosis:	DM (diabetes mellitus)  Assessment and plan of treatment:	Continue your home metformin.  Secondary Diagnosis:	HTN (hypertension)  Assessment and plan of treatment:	Continue your home metoprolol  Secondary Diagnosis:	HLD (hyperlipidemia)  Assessment and plan of treatment:	Continue your home simvastatin.  Secondary Diagnosis:	CAD (coronary artery disease)  Assessment and plan of treatment:	Your aspirin was held due to your low platelet count. Please refrain from taking your aspirin until your platelet count increases to above 50. Principal Discharge DX:	Thrombocytopenia  Secondary Diagnosis:	Oral lesion  Secondary Diagnosis:	Sick sinus syndrome  Secondary Diagnosis:	DM (diabetes mellitus)  Secondary Diagnosis:	HTN (hypertension)  Secondary Diagnosis:	HLD (hyperlipidemia)  Secondary Diagnosis:	CAD (coronary artery disease)

## 2018-08-06 NOTE — DISCHARGE NOTE ADULT - HOSPITAL COURSE
84 year old male with PMH of hairy cell leukemia (last treated with chemo 8 years ago), s/p PPM 2017, Diabetes type 2, HTN, hypothyroidism and HLD presenting with a chief complaint of papule and bleeding from the mouth and petechial rash in the bilateral lower extremities, found to be thrombocytopenic to a platelet count of <3, admitted for ITP. Patient was placed on IVIG for two days and four days of IV decadron with improvement of his platelet count. 84 year old male with PMH of hairy cell leukemia (last treated with chemo 8 years ago), s/p PPM 2017, Diabetes type 2, HTN, hypothyroidism and HLD presenting with a chief complaint of papule and bleeding from the mouth and petechial rash in the bilateral lower extremities, found to be thrombocytopenic to a platelet count of <3, admitted for ITP. Patient was placed on IVIG for two days and four days of IV decadron with increase of his platelet count to 17. Patient stayed overnight until his platelet count was stable above 20. 84 year old male with PMH of hairy cell leukemia (last treated with chemo 8 years ago), s/p PPM 2017, Diabetes type 2, HTN, hypothyroidism and HLD presenting with a chief complaint of papule and bleeding from the mouth and petechial rash in the bilateral lower extremities, found to be thrombocytopenic to a platelet count of <3, admitted for ITP. Patient was placed on IVIG for two days and four days of IV decadron with increase of his platelet count to 17, complicated by refractory ITP with decrease in his platelet count to 8. Patient received a bone marrow biopsy s/p 1 unit platelets.     . Patient stayed overnight until his platelet count was stable above 20. 84 year old male with PMH of hairy cell leukemia (last treated with chemo 8 years ago), s/p PPM 2017, Diabetes type 2, HTN, hypothyroidism and HLD presenting with a chief complaint of papule and bleeding from the mouth and petechial rash in the bilateral lower extremities, found to be thrombocytopenic to a platelet count of <3, admitted for ITP. Patient was placed on IVIG for two days and four days of IV decadron with increase of his platelet count to 17, complicated by refractory ITP with decrease in his platelet count to 8. Patient received a bone marrow biopsy s/p 1 unit platelets and was started on prednisone 60 BID.    Patient stayed until his platelet count was stable above 20. 84 year old male with PMH of hairy cell leukemia (last treated with chemo 8 years ago), s/p PPM 2017, Diabetes type 2, HTN, hypothyroidism and HLD presenting with a chief complaint of papule and bleeding from the mouth and petechial rash in the bilateral lower extremities, Found to be thrombocytopenic to a platelet count of <3, admitted for ITP. Patient was placed on IVIG for two days and four days of IV decadron with increase of his platelet count to 17, complicated by refractory ITP with decrease in his platelet count to 8. Patient received a bone marrow biopsy s/p 1 unit platelets and was started on prednisone 60mg daily.  He was also started on NPlate and given additional unit of platelet transfusion.  Unfortunately on the morning of  patient was found with altered mental status and neurologic deficits.  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 Early signs of hydrocephalus with mild bilateral temporal horn flaring.  Pt. was transferred to ICU and family had requested palliative care and comfort care measures only.  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

## 2018-08-06 NOTE — DISCHARGE NOTE ADULT - ABILITY TO HEAR (WITH HEARING AID OR HEARING APPLIANCE IF NORMALLY USED):
Wears bilateral hearing aids./Mildly to Moderately Impaired: difficulty hearing in some environments or speaker may need to increase volume or speak distinctly

## 2018-08-06 NOTE — DISCHARGE NOTE ADULT - SECONDARY DIAGNOSIS.
Oral lesion Sick sinus syndrome DM (diabetes mellitus) HTN (hypertension) HLD (hyperlipidemia) CAD (coronary artery disease)

## 2018-08-06 NOTE — PROGRESS NOTE ADULT - SUBJECTIVE AND OBJECTIVE BOX
Patient seen and examined;  Chart reviewed and events noted;   bleeding in his mouth has stopped    MEDICATIONS  (STANDING):  acetaminophen   Tablet 650 milliGRAM(s) Oral daily  dexamethasone  IVPB 40 milliGRAM(s) IV Intermittent daily  dextrose 5%. 1000 milliLiter(s) (50 mL/Hr) IV Continuous <Continuous>  diphenhydrAMINE   Capsule 50 milliGRAM(s) Oral daily  docusate sodium 100 milliGRAM(s) Oral two times a day  insulin lispro (HumaLOG) corrective regimen sliding scale   SubCutaneous three times a day before meals  insulin lispro (HumaLOG) corrective regimen sliding scale   SubCutaneous at bedtime  levothyroxine 75 MICROGram(s) Oral daily  metoprolol succinate ER 25 milliGRAM(s) Oral daily  multivitamin 1 Tablet(s) Oral daily  pantoprazole    Tablet 40 milliGRAM(s) Oral before breakfast  simvastatin 40 milliGRAM(s) Oral at bedtime    MEDICATIONS  (PRN):  dextrose 40% Gel 15 Gram(s) Oral once PRN Blood Glucose LESS THAN 70 milliGRAM(s)/deciliter  diphenhydrAMINE   Capsule 25 milliGRAM(s) Oral at bedtime PRN Insomnia  glucagon  Injectable 1 milliGRAM(s) IntraMuscular once PRN Glucose LESS THAN 70 milligrams/deciliter      Vital Signs Last 24 Hrs  T(C): 36.7 (06 Aug 2018 08:03), Max: 37.2 (05 Aug 2018 15:14)  T(F): 98 (06 Aug 2018 08:03), Max: 98.9 (05 Aug 2018 15:14)  HR: 70 (06 Aug 2018 08:03) (65 - 70)  BP: 140/70 (06 Aug 2018 08:03) (134/77 - 166/80)  RR: 18 (06 Aug 2018 08:03) (18 - 18)  SpO2: 98% (06 Aug 2018 08:03) (96% - 98%)    PHYSICAL EXAM  General: adult in NAD  HEENT: clear oropharynx, anicteric sclera, pink conjunctivae; blood blisters have resolved; large right cheek blister is dramatically improved  Neck: supple  CV: normal S1S2 with no murmur rubs or gallops  Lungs: clear to auscultation, no wheezes, no rhales  Abdomen: soft non-tender non-distended, no hepato/splenomegaly  Ext: no clubbing cyanosis or edema  Skin: +  petichiae in bilateral LEs; worsened by SCD boots  Neuro: alert and oriented X3 no focal deficits      LABS:                        13.9   9.87  )-----------( 15      ( 06 Aug 2018 09:29 )             40.8     Platelet Count - Automated: 15 K/uL (08-06 @ 09:29)  Platelet Count - Automated: 5 K/uL (08-05 @ 06:40)  Platelet Count - Automated: <3.0 K/uL (08-04 @ 08:55)    08-06    139  |  103  |  27<H>  ----------------------------<  189<H>  3.9   |  25  |  1.20    Ca    8.7      06 Aug 2018 09:29

## 2018-08-06 NOTE — PROGRESS NOTE ADULT - ASSESSMENT
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 Daryl Allred, admitted on 8/4/18 with 1 day history of petichial rash on LEs and bleeding from oral muco-cutaneous tissue; platelet count <3 documented at time of admission and confirmed with review of peripheral blood smear with completely normal WBC and Hg level; working diagnosis of ITP; patient also reportedly had augmentin about 2 weeks prior to acute drop in platelet count (possible trigger)  - no role for transfusion of platelets as patient does not have life threatening bleeding  - continue Decadron 40mg IV X4 days (today is day 3 out of 4)  - completed 2 days of  IVIG 1g/kg IV (75 gram)  - clinically much improved with mild improvement in platelet count  - hold Aspirin until platelet count >50K  - would monitor blood glucose while on high dose pulse decadron; on sliding scale insulin   - continue with PPI  - if not improvement after Decadron, would consider bone marrow evaluation to rule out relapsed Hairy Cell Leukemia  - follow CBC daily

## 2018-08-06 NOTE — DISCHARGE NOTE ADULT - PATIENT PORTAL LINK FT
You can access the [facility] Patient Portal, offered by [facility], by registering with the following website: [email or web address]

## 2018-08-06 NOTE — PROGRESS NOTE ADULT - SUBJECTIVE AND OBJECTIVE BOX
HPI:  84 year old male with PMH of hairy cell leukemia (last treated with chemo 8 years ago), s/p PPM 2017, Diabetes type 2, HTN, hypothyroidism and HLD chief complaint of mucosal bleeding in the mouth and rash.    INTERVAL EVENTS: No significant events overnight. Patient denies any oral bleeding, chest pain, shortness of breath, difficulty swallowing, nausea/vomiting or leg pain.    REVIEW OF SYSTEMS:    CONSTITUTIONAL: No weakness.   RESPIRATORY: No shortness of breath  CARDIOVASCULAR: No chest pain  GASTROINTESTINAL: No nausea, vomiting.   GENITOURINARY: No dysuria, frequency or hematuria  NEUROLOGICAL: No dizziness, numbness, or weakness  SKIN: No itching, burning, rashes, or lesions   All other review of systems is negative unless indicated above.    VITAL SIGNS:        PHYSICAL EXAM:     GENERAL: no acute distress  HEENT: NC/AT, EOMI, neck supple, MMM  RESPIRATORY: LCTAB/L, no rhonchi, rales, or wheezing  CARDIOVASCULAR: RRR, no murmurs, gallops, rubs  ABDOMINAL: soft, non-tender, non-distended, positive bowel sounds   EXTREMITIES: no clubbing, cyanosis, or edema  NEUROLOGICAL: alert and oriented x 3, non-focal  SKIN: no rashes or lesions   MUSCULOSKELETAL: no gross joint deformity                          13.9   9.87  )-----------( 15       ( 06 Aug 2018 09:29 )             40.8     08-06    139  |  103  |  27<H>  ----------------------------<  189<H>  3.9   |  25  |  1.20    Ca    8.7      06 Aug 2018 09:29        CAPILLARY BLOOD GLUCOSE      POCT Blood Glucose.: 179 mg/dL (06 Aug 2018 11:51)  POCT Blood Glucose.: 191 mg/dL (06 Aug 2018 07:48)  POCT Blood Glucose.: 235 mg/dL (05 Aug 2018 21:25)  POCT Blood Glucose.: 133 mg/dL (05 Aug 2018 16:25)      MEDICATIONS  (STANDING):  acetaminophen   Tablet 650 milliGRAM(s) Oral daily  dexamethasone  IVPB 40 milliGRAM(s) IV Intermittent daily  dextrose 5%. 1000 milliLiter(s) (50 mL/Hr) IV Continuous <Continuous>  dextrose 50% Injectable 12.5 Gram(s) IV Push once  dextrose 50% Injectable 25 Gram(s) IV Push once  dextrose 50% Injectable 25 Gram(s) IV Push once  diphenhydrAMINE   Capsule 50 milliGRAM(s) Oral daily  docusate sodium 100 milliGRAM(s) Oral two times a day  insulin lispro (HumaLOG) corrective regimen sliding scale   SubCutaneous three times a day before meals  insulin lispro (HumaLOG) corrective regimen sliding scale   SubCutaneous at bedtime  levothyroxine 75 MICROGram(s) Oral daily  metoprolol succinate ER 25 milliGRAM(s) Oral daily  multivitamin 1 Tablet(s) Oral daily  pantoprazole    Tablet 40 milliGRAM(s) Oral before breakfast  simvastatin 40 milliGRAM(s) Oral at bedtime HPI:  84 year old male with PMH of hairy cell leukemia (last treated with chemo 8 years ago), s/p PPM 2017, Diabetes type 2, HTN, hypothyroidism and HLD chief complaint of mucosal bleeding in the mouth and rash.    INTERVAL EVENTS: No significant events overnight. Patient denies any oral bleeding, chest pain, shortness of breath, difficulty swallowing, nausea/vomiting or leg pain.    REVIEW OF SYSTEMS:    CONSTITUTIONAL: No weakness.   RESPIRATORY: No shortness of breath  CARDIOVASCULAR: No chest pain  GASTROINTESTINAL: No nausea, vomiting.   GENITOURINARY: No dysuria, frequency or hematuria  NEUROLOGICAL: No dizziness, numbness, or weakness  SKIN: No itching, burning, rashes, or lesions   All other review of systems is negative unless indicated above.    Vital Signs Last 24 Hrs  T(C): 36.4 (06 Aug 2018 12:13), Max: 37.2 (05 Aug 2018 15:14)  T(F): 97.6 (06 Aug 2018 12:13), Max: 98.9 (05 Aug 2018 15:14)  HR: 60 (06 Aug 2018 12:13) (60 - 70)  BP: 157/71 (06 Aug 2018 12:13) (134/77 - 166/80)  BP(mean): --  RR: 17 (06 Aug 2018 12:13) (17 - 18)  SpO2: 96% (06 Aug 2018 12:13) (96% - 98%)    PHYSICAL EXAM:     GENERAL: no acute distress  HEENT: Small nonbleeding ulcer on right lateral side of inner mouth.   RESPIRATORY: LCTAB/L, no rhonchi, rales, or wheezing  CARDIOVASCULAR: RRR, no murmurs, gallops, rubs  ABDOMINAL: soft, non-tender, non-distended.  EXTREMITIES: no clubbing, cyanosis, or edema  NEUROLOGICAL: alert and oriented x 3, non-focal  SKIN: non-blanching petechiae in bilateral lower extremities.  MUSCULOSKELETAL: no gross joint deformity                          13.9   9.87  )-----------( 15       ( 06 Aug 2018 09:29 )             40.8     08-06    139  |  103  |  27<H>  ----------------------------<  189<H>  3.9   |  25  |  1.20    Ca    8.7      06 Aug 2018 09:29        CAPILLARY BLOOD GLUCOSE      POCT Blood Glucose.: 179 mg/dL (06 Aug 2018 11:51)  POCT Blood Glucose.: 191 mg/dL (06 Aug 2018 07:48)  POCT Blood Glucose.: 235 mg/dL (05 Aug 2018 21:25)  POCT Blood Glucose.: 133 mg/dL (05 Aug 2018 16:25)      MEDICATIONS  (STANDING):  acetaminophen   Tablet 650 milliGRAM(s) Oral daily  dexamethasone  IVPB 40 milliGRAM(s) IV Intermittent daily  dextrose 5%. 1000 milliLiter(s) (50 mL/Hr) IV Continuous <Continuous>  dextrose 50% Injectable 12.5 Gram(s) IV Push once  dextrose 50% Injectable 25 Gram(s) IV Push once  dextrose 50% Injectable 25 Gram(s) IV Push once  diphenhydrAMINE   Capsule 50 milliGRAM(s) Oral daily  docusate sodium 100 milliGRAM(s) Oral two times a day  insulin lispro (HumaLOG) corrective regimen sliding scale   SubCutaneous three times a day before meals  insulin lispro (HumaLOG) corrective regimen sliding scale   SubCutaneous at bedtime  levothyroxine 75 MICROGram(s) Oral daily  metoprolol succinate ER 25 milliGRAM(s) Oral daily  multivitamin 1 Tablet(s) Oral daily  pantoprazole    Tablet 40 milliGRAM(s) Oral before breakfast  simvastatin 40 milliGRAM(s) Oral at bedtime HPI:  84 year old male with PMH of hairy cell leukemia (last treated with chemo 8 years ago), s/p PPM 2017, Diabetes type 2, HTN, hypothyroidism and HLD chief complaint of mucosal bleeding in the mouth and rash.    INTERVAL EVENTS: No significant events overnight. Patient denies any oral bleeding, chest pain, shortness of breath, difficulty swallowing, nausea/vomiting or leg pain. Rash on legs persists.    REVIEW OF SYSTEMS:    CONSTITUTIONAL: No weakness.   HEENT: oral bleeding resolved; no vision changes  RESPIRATORY: No shortness of breath  CARDIOVASCULAR: No chest pain  GASTROINTESTINAL: No nausea, vomiting.   GENITOURINARY: No dysuria, frequency or hematuria  NEUROLOGICAL: No dizziness, numbness, or weakness  SKIN: +Rash on LEs unchanged; No itching  All other review of systems is negative unless indicated above.    Vital Signs Last 24 Hrs  T(C): 36.4 (06 Aug 2018 12:13), Max: 37.2 (05 Aug 2018 15:14)  T(F): 97.6 (06 Aug 2018 12:13), Max: 98.9 (05 Aug 2018 15:14)  HR: 60 (06 Aug 2018 12:13) (60 - 70)  BP: 157/71 (06 Aug 2018 12:13) (134/77 - 166/80)  BP(mean): --  RR: 17 (06 Aug 2018 12:13) (17 - 18)  SpO2: 96% (06 Aug 2018 12:13) (96% - 98%)    PHYSICAL EXAM:     GENERAL: no acute distress  HEENT: Small nonbleeding ulcer on right lateral side of inner mouth.   RESPIRATORY: CTA B/L, no rhonchi, rales, or wheezing  CARDIOVASCULAR: RRR, no murmurs, gallops, rubs  ABDOMINAL: soft, non-tender, non-distended.  EXTREMITIES: no clubbing, cyanosis, or edema  NEUROLOGICAL: alert and oriented x 3, non-focal  SKIN: non-blanching petechiae in bilateral lower extremities.  MUSCULOSKELETAL: no gross joint deformity                          13.9   9.87  )-----------( 15       ( 06 Aug 2018 09:29 )             40.8     08-06    139  |  103  |  27<H>  ----------------------------<  189<H>  3.9   |  25  |  1.20    Ca    8.7      06 Aug 2018 09:29        CAPILLARY BLOOD GLUCOSE      POCT Blood Glucose.: 179 mg/dL (06 Aug 2018 11:51)  POCT Blood Glucose.: 191 mg/dL (06 Aug 2018 07:48)  POCT Blood Glucose.: 235 mg/dL (05 Aug 2018 21:25)  POCT Blood Glucose.: 133 mg/dL (05 Aug 2018 16:25)      MEDICATIONS  (STANDING):  acetaminophen   Tablet 650 milliGRAM(s) Oral daily  dexamethasone  IVPB 40 milliGRAM(s) IV Intermittent daily  dextrose 5%. 1000 milliLiter(s) (50 mL/Hr) IV Continuous <Continuous>  dextrose 50% Injectable 12.5 Gram(s) IV Push once  dextrose 50% Injectable 25 Gram(s) IV Push once  dextrose 50% Injectable 25 Gram(s) IV Push once  diphenhydrAMINE   Capsule 50 milliGRAM(s) Oral daily  docusate sodium 100 milliGRAM(s) Oral two times a day  insulin lispro (HumaLOG) corrective regimen sliding scale   SubCutaneous three times a day before meals  insulin lispro (HumaLOG) corrective regimen sliding scale   SubCutaneous at bedtime  levothyroxine 75 MICROGram(s) Oral daily  metoprolol succinate ER 25 milliGRAM(s) Oral daily  multivitamin 1 Tablet(s) Oral daily  pantoprazole    Tablet 40 milliGRAM(s) Oral before breakfast  simvastatin 40 milliGRAM(s) Oral at bedtime

## 2018-08-06 NOTE — DISCHARGE NOTE ADULT - CARE PROVIDERS DIRECT ADDRESSES
,[email or web address],[email or web address]

## 2018-08-06 NOTE — PROGRESS NOTE ADULT - PROBLEM SELECTOR PLAN 4
- Type 2 diabetic - HbA1c 6.5%  - Insulin coverage scale with hypoglycemic protocol  - Consistent carbohydrate diet. - Type 2 diabetic - HbA1c 6.5%  - Insulin coverage scale with hypoglycemic protocol -- elevated FSG due to pulse dose steroids which will end tomorrow.  - Consistent carbohydrate diet.

## 2018-08-06 NOTE — DISCHARGE NOTE ADULT - PLAN OF CARE
Improving During your stay you were found to have a platelet count of 3 that improved with IVIG and steroids. Please follow up with your hematologist/oncologist within the next week to monitor your platelet count. Your oral lesion is no longer bleeding and has reduced in size and developed likely due to your decreased platelet count. Please follow up with your hematologist/oncologist within the next week to monitor your platelet count. Please follow up with your cardiologist should you start to feel palpitations or believe your pacemaker may not be functioning. Continue your home metformin. Continue your home metoprolol Continue your home simvastatin. Your aspirin was held due to your low platelet count. Please refrain from taking your aspirin until your platelet count increases to above 50.

## 2018-08-06 NOTE — PROGRESS NOTE ADULT - PROBLEM SELECTOR PLAN 1
- Likely due to ITP, possibly secondary to augmentin use 2 weeks ago.  - Platelet level uptrendin -> 15.  - heme recs appreciated  - IVIG stopped - completed 2 days.  - c/w pulse dose steroids (day 3 of 4)  - monitor platelet count   - avoid all blood thinners for now

## 2018-08-07 DIAGNOSIS — I25.10 ATHEROSCLEROTIC HEART DISEASE OF NATIVE CORONARY ARTERY WITHOUT ANGINA PECTORIS: ICD-10-CM

## 2018-08-07 DIAGNOSIS — I49.5 SICK SINUS SYNDROME: ICD-10-CM

## 2018-08-07 LAB
ANION GAP SERPL CALC-SCNC: 10 MMOL/L — SIGNIFICANT CHANGE UP (ref 5–17)
BUN SERPL-MCNC: 29 MG/DL — HIGH (ref 7–23)
CALCIUM SERPL-MCNC: 8.5 MG/DL — SIGNIFICANT CHANGE UP (ref 8.5–10.1)
CHLORIDE SERPL-SCNC: 105 MMOL/L — SIGNIFICANT CHANGE UP (ref 96–108)
CO2 SERPL-SCNC: 25 MMOL/L — SIGNIFICANT CHANGE UP (ref 22–31)
CREAT SERPL-MCNC: 1.2 MG/DL — SIGNIFICANT CHANGE UP (ref 0.5–1.3)
GLUCOSE SERPL-MCNC: 175 MG/DL — HIGH (ref 70–99)
HCT VFR BLD CALC: 37.1 % — LOW (ref 39–50)
HCT VFR BLD CALC: 37.2 % — LOW (ref 39–50)
HGB BLD-MCNC: 12.9 G/DL — LOW (ref 13–17)
HGB BLD-MCNC: 13 G/DL — SIGNIFICANT CHANGE UP (ref 13–17)
MCHC RBC-ENTMCNC: 30.5 PG — SIGNIFICANT CHANGE UP (ref 27–34)
MCHC RBC-ENTMCNC: 30.6 PG — SIGNIFICANT CHANGE UP (ref 27–34)
MCHC RBC-ENTMCNC: 34.8 GM/DL — SIGNIFICANT CHANGE UP (ref 32–36)
MCHC RBC-ENTMCNC: 34.9 GM/DL — SIGNIFICANT CHANGE UP (ref 32–36)
MCV RBC AUTO: 87.5 FL — SIGNIFICANT CHANGE UP (ref 80–100)
MCV RBC AUTO: 87.7 FL — SIGNIFICANT CHANGE UP (ref 80–100)
NRBC # BLD: 0 /100 WBCS — SIGNIFICANT CHANGE UP (ref 0–0)
NRBC # BLD: 0 /100 WBCS — SIGNIFICANT CHANGE UP (ref 0–0)
PLATELET # BLD AUTO: 16 K/UL — CRITICAL LOW (ref 150–400)
PLATELET # BLD AUTO: 17 K/UL — CRITICAL LOW (ref 150–400)
POTASSIUM SERPL-MCNC: 3.6 MMOL/L — SIGNIFICANT CHANGE UP (ref 3.5–5.3)
POTASSIUM SERPL-SCNC: 3.6 MMOL/L — SIGNIFICANT CHANGE UP (ref 3.5–5.3)
RBC # BLD: 4.23 M/UL — SIGNIFICANT CHANGE UP (ref 4.2–5.8)
RBC # BLD: 4.25 M/UL — SIGNIFICANT CHANGE UP (ref 4.2–5.8)
RBC # FLD: 13.3 % — SIGNIFICANT CHANGE UP (ref 10.3–14.5)
RBC # FLD: 13.4 % — SIGNIFICANT CHANGE UP (ref 10.3–14.5)
SODIUM SERPL-SCNC: 140 MMOL/L — SIGNIFICANT CHANGE UP (ref 135–145)
WBC # BLD: 8.66 K/UL — SIGNIFICANT CHANGE UP (ref 3.8–10.5)
WBC # BLD: 9.49 K/UL — SIGNIFICANT CHANGE UP (ref 3.8–10.5)
WBC # FLD AUTO: 8.66 K/UL — SIGNIFICANT CHANGE UP (ref 3.8–10.5)
WBC # FLD AUTO: 9.49 K/UL — SIGNIFICANT CHANGE UP (ref 3.8–10.5)

## 2018-08-07 PROCEDURE — 99233 SBSQ HOSP IP/OBS HIGH 50: CPT | Mod: GC

## 2018-08-07 RX ORDER — BENZOCAINE AND MENTHOL 5; 1 G/100ML; G/100ML
1 LIQUID ORAL
Qty: 0 | Refills: 0 | Status: DISCONTINUED | OUTPATIENT
Start: 2018-08-07 | End: 2018-08-12

## 2018-08-07 RX ADMIN — Medication 25 MILLIGRAM(S): at 22:26

## 2018-08-07 RX ADMIN — Medication 1 TABLET(S): at 11:59

## 2018-08-07 RX ADMIN — BENZOCAINE AND MENTHOL 1 LOZENGE: 5; 1 LIQUID ORAL at 11:59

## 2018-08-07 RX ADMIN — Medication 25 MILLIGRAM(S): at 06:00

## 2018-08-07 RX ADMIN — SIMVASTATIN 40 MILLIGRAM(S): 20 TABLET, FILM COATED ORAL at 22:26

## 2018-08-07 RX ADMIN — Medication 120 MILLIGRAM(S): at 06:00

## 2018-08-07 RX ADMIN — PANTOPRAZOLE SODIUM 40 MILLIGRAM(S): 20 TABLET, DELAYED RELEASE ORAL at 06:00

## 2018-08-07 RX ADMIN — Medication 2: at 17:05

## 2018-08-07 RX ADMIN — Medication 2: at 11:59

## 2018-08-07 RX ADMIN — Medication 100 MILLIGRAM(S): at 06:00

## 2018-08-07 RX ADMIN — Medication 100 MILLIGRAM(S): at 17:05

## 2018-08-07 RX ADMIN — Medication 75 MICROGRAM(S): at 06:00

## 2018-08-07 NOTE — PROGRESS NOTE ADULT - PROBLEM SELECTOR PLAN 1
- Likely due to ITP, possibly secondary to augmentin use 2 weeks ago.  - Platelet level uptrendin -> 16.  - heme recs appreciated  - s/p IVIG.  - c/w pulse dose steroids (day 4 of 4)  - monitor platelet count   - hold blood thinners for now.

## 2018-08-07 NOTE — PROGRESS NOTE ADULT - ASSESSMENT
85 yo man w hx Hairy Cell leukemia 2010 CR s/p cladribine under care of Dr. Daryl Allred, admitted 8/4/18 with 1 day history of petechie adeline legs and bleeding from bloody blisters oral mucosa/tongue,found isolated thrombocytopenia<3 working diagnosis of ITP  patient also reportedly had Augmentin about 2 weeks prior to acute drop in platelet count   - on 4days IV pulse Decadron, today last day, post 2 days IVIG(last dose 2 days ago)  - plts incr to 15k yesterday, today 16k, last day IV Decadron today(40mg)  - no signs of active bleeds  - hold Aspirin until platelet count >50K  - monitor blood glucose while on high dose pulse decadron; on sliding scale insulin   - continue with PPI  - for repeat CBC later today, can discharge home if plts >= 20k and will f/u as outpatient for further management

## 2018-08-07 NOTE — PROGRESS NOTE ADULT - SUBJECTIVE AND OBJECTIVE BOX
All interim records and events noted.    feeling well, no active bleeds, wife present      MEDICATIONS  (STANDING):  benzocaine 15 mG/menthol 3.6 mG Lozenge 1 Lozenge Oral five times a day  dextrose 5%. 1000 milliLiter(s) (50 mL/Hr) IV Continuous <Continuous>  dextrose 50% Injectable 12.5 Gram(s) IV Push once  dextrose 50% Injectable 25 Gram(s) IV Push once  dextrose 50% Injectable 25 Gram(s) IV Push once  docusate sodium 100 milliGRAM(s) Oral two times a day  insulin lispro (HumaLOG) corrective regimen sliding scale   SubCutaneous three times a day before meals  insulin lispro (HumaLOG) corrective regimen sliding scale   SubCutaneous at bedtime  levothyroxine 75 MICROGram(s) Oral daily  metoprolol succinate ER 25 milliGRAM(s) Oral daily  multivitamin 1 Tablet(s) Oral daily  pantoprazole    Tablet 40 milliGRAM(s) Oral before breakfast  simvastatin 40 milliGRAM(s) Oral at bedtime    MEDICATIONS  (PRN):  dextrose 40% Gel 15 Gram(s) Oral once PRN Blood Glucose LESS THAN 70 milliGRAM(s)/deciliter  diphenhydrAMINE   Capsule 25 milliGRAM(s) Oral at bedtime PRN Insomnia  glucagon  Injectable 1 milliGRAM(s) IntraMuscular once PRN Glucose LESS THAN 70 milligrams/deciliter      Vital Signs Last 24 Hrs  T(C): 36.4 (07 Aug 2018 08:20), Max: 36.9 (07 Aug 2018 04:58)  T(F): 97.6 (07 Aug 2018 08:20), Max: 98.4 (07 Aug 2018 04:58)  HR: 57 (07 Aug 2018 08:20) (57 - 66)  BP: 155/83 (07 Aug 2018 08:20) (127/79 - 166/72)  BP(mean): --  RR: 18 (07 Aug 2018 08:20) (17 - 19)  SpO2: 98% (07 Aug 2018 08:20) (95% - 98%)    PHYSICAL EXAM  General: well developed  well nourished, in no acute distress  Head: atraumatic, normocephalic  ENT: sclera anicteric, buccal mucosa moist  Neck: supple, trachea midline, no palpable masses  CV: S1 S2, regular rate and rhythm  Lungs: clear to auscultation, no wheezes/rhonchi  Abdomen: soft, nontender, bowel sounds present, no palpable hepatosplenomegaly  Extrem: no clubbing/cyanosis/edema  Skin: multiple petechiae adeline lower legs  Neuro: alert and oriented X3,  no focal deficits      LABS:             12.9   9.49  )-----------( 16       ( 08-07 @ 06:48 )             37.1                13.9   9.87  )-----------( 15       ( 08-06 @ 09:29 )             40.8                13.7   7.96  )-----------( 5        ( 08-05 @ 06:40 )             39.7       08-07    140  |  105  |  29<H>  ----------------------------<  175<H>  3.6   |  25  |  1.20    Ca    8.5      07 Aug 2018 06:48      08-04 @ 08:55  PT10.3 INR0.95  PTT--      RADIOLOGY & ADDITIONAL STUDIES:    IMPRESSION/RECOMMENDATIONS:

## 2018-08-07 NOTE — PROGRESS NOTE ADULT - PROBLEM SELECTOR PLAN 4
- Type 2 diabetic - HbA1c 6.5%  - Insulin coverage scale with hypoglycemic protocol -- elevated FSG due to pulse dose steroids which will end tomorrow.  - Consistent carbohydrate diet.

## 2018-08-07 NOTE — PROGRESS NOTE ADULT - PROBLEM SELECTOR PLAN 4
- Type 2 diabetic - HbA1c 6.5%  - Insulin coverage scale with hypoglycemic protocol -- elevated FSG due to pulse dose steroids which will end today.  - Consistent carbohydrate diet.

## 2018-08-07 NOTE — PROGRESS NOTE ADULT - PROBLEM SELECTOR PLAN 1
- Likely due to ITP, possibly secondary to augmentin use 2 weeks ago.  - Platelet level uptrending: 15 -> 16.  - heme recs appreciated  - s/p IVIG.  - complete pulse dose steroids (day 4 of 4), d/c PPI tomorrow  - f/u platelet count in afternoon - if >20, plan to discharge. - Likely due to ITP, possibly secondary to augmentin use 2 weeks ago.  - Platelet level uptrending slowly: 15 -> 16.  - heme recs appreciated  - s/p IVIG.  - complete pulse dose steroids (day 4 of 4), d/c PPI tomorrow  - f/u platelet count in afternoon - if >20, plan to discharge.

## 2018-08-07 NOTE — PROGRESS NOTE ADULT - ASSESSMENT
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 Clinically improving with platelets trending up

## 2018-08-07 NOTE — PROGRESS NOTE ADULT - SUBJECTIVE AND OBJECTIVE BOX
Patient is a 84y old  Male who presents with a chief complaint of Petechial Rash and Bleeding Oral Ulcer (06 Aug 2018 16:01)    INTERVAL HPI/OVERNIGHT EVENTS: No acute events o/n. Patient seen and examined at bedside. Denies new rashes, oral bleeding, vision changes, SOB, CP, difficulty swallowing, n/v, leg pain or itching. Rash on legs persist. Endorses having a hoarse voice without any throat pain or cough.     REVIEW OF SYSTEMS:  CONSTITUTIONAL: No fever or chills  HEENT:  No headache, no sore throat. Mouth sore improved  RESPIRATORY: No cough, wheezing, or shortness of breath  CARDIOVASCULAR: No chest pain, palpitations, or leg swelling  GASTROINTESTINAL: No abd pain, nausea, vomiting, or diarrhea  NEUROLOGICAL: no focal weakness or dizziness  MUSCULOSKELETAL: no myalgias     Vital Signs Last 24 Hrs  T(C): 36.4 (07 Aug 2018 08:20), Max: 36.9 (07 Aug 2018 04:58)  T(F): 97.6 (07 Aug 2018 08:20), Max: 98.4 (07 Aug 2018 04:58)  HR: 57 (07 Aug 2018 08:20) (57 - 66)  BP: 155/83 (07 Aug 2018 08:20) (127/79 - 166/72)  BP(mean): --  RR: 18 (07 Aug 2018 08:20) (17 - 19)  SpO2: 98% (07 Aug 2018 08:20) (95% - 98%)    PHYSICAL EXAM:  GENERAL: NAD  HEENT:  EOMI, small non-bleeding ulcer on right lateral side of mouth  CHEST/LUNG:  CTA b/l, no rales, wheezes, or rhonchi  HEART:  RRR, S1, S2  ABDOMEN:  BS+, soft, nontender, nondistended  EXTREMITIES: no edema, cyanosis, or calf tenderness. +eccymoses bilaterally at IV site  NERVOUS SYSTEM: Alert and Oriented, no focal deficits  SKIN: +petechiae LE below the knee b/l      MEDICATIONS  (STANDING):  dextrose 5%. 1000 milliLiter(s) (50 mL/Hr) IV Continuous <Continuous>  dextrose 50% Injectable 12.5 Gram(s) IV Push once  dextrose 50% Injectable 25 Gram(s) IV Push once  dextrose 50% Injectable 25 Gram(s) IV Push once  docusate sodium 100 milliGRAM(s) Oral two times a day  insulin lispro (HumaLOG) corrective regimen sliding scale   SubCutaneous three times a day before meals  insulin lispro (HumaLOG) corrective regimen sliding scale   SubCutaneous at bedtime  levothyroxine 75 MICROGram(s) Oral daily  metoprolol succinate ER 25 milliGRAM(s) Oral daily  multivitamin 1 Tablet(s) Oral daily  pantoprazole    Tablet 40 milliGRAM(s) Oral before breakfast  simvastatin 40 milliGRAM(s) Oral at bedtime    MEDICATIONS  (PRN):  dextrose 40% Gel 15 Gram(s) Oral once PRN Blood Glucose LESS THAN 70 milliGRAM(s)/deciliter  diphenhydrAMINE   Capsule 25 milliGRAM(s) Oral at bedtime PRN Insomnia  glucagon  Injectable 1 milliGRAM(s) IntraMuscular once PRN Glucose LESS THAN 70 milligrams/deciliter      Allergies    No Known Allergies    Intolerances          LABS:                        12.9   9.49  )-----------( 16       ( 07 Aug 2018 06:48 )             37.1     CBC Full  -  ( 07 Aug 2018 06:48 )  WBC Count : 9.49 K/uL  Hemoglobin : 12.9 g/dL  Hematocrit : 37.1 %  Platelet Count - Automated : 16 K/uL  Mean Cell Volume : 87.7 fl  Mean Cell Hemoglobin : 30.5 pg  Mean Cell Hemoglobin Concentration : 34.8 gm/dL  Auto Neutrophil # : x  Auto Lymphocyte # : x  Auto Monocyte # : x  Auto Eosinophil # : x  Auto Basophil # : x  Auto Neutrophil % : x  Auto Lymphocyte % : x  Auto Monocyte % : x  Auto Eosinophil % : x  Auto Basophil % : x    07 Aug 2018 06:48    140    |  105    |  29     ----------------------------<  175    3.6     |  25     |  1.20     Ca    8.5        07 Aug 2018 06:48          CAPILLARY BLOOD GLUCOSE      POCT Blood Glucose.: 149 mg/dL (07 Aug 2018 08:00)  POCT Blood Glucose.: 140 mg/dL (06 Aug 2018 20:56)  POCT Blood Glucose.: 235 mg/dL (06 Aug 2018 16:58)  POCT Blood Glucose.: 179 mg/dL (06 Aug 2018 11:51)          RADIOLOGY & ADDITIONAL TESTS:    Personally reviewed.     Consultant(s) Notes Reviewed:  [x] YES  [ ] NO    Care Discussed with [x] Consultants  [x] Patient  [ ] Family  [ ]      [ ] Other; RN  DVT ppx Patient is a 84y old  Male who presents with a chief complaint of Petechial Rash and Bleeding Oral Ulcer (06 Aug 2018 16:01)    INTERVAL HPI/OVERNIGHT EVENTS: No acute events o/n. Patient seen and examined at bedside. Denies new rashes, oral bleeding, vision changes, SOB, CP, difficulty swallowing, n/v, leg pain or itching. Rash on legs persist. Endorses having a hoarse voice without any throat pain or cough.     REVIEW OF SYSTEMS:  CONSTITUTIONAL: No fever or chills  HEENT:  No headache, no sore throat. Mouth sore improved  RESPIRATORY: No cough, wheezing, or shortness of breath  CARDIOVASCULAR: No chest pain, palpitations, or leg swelling  GASTROINTESTINAL: No abd pain, nausea, vomiting, or diarrhea  NEUROLOGICAL: no focal weakness or dizziness  MUSCULOSKELETAL: no myalgias     Vital Signs Last 24 Hrs  T(C): 36.4 (07 Aug 2018 08:20), Max: 36.9 (07 Aug 2018 04:58)  T(F): 97.6 (07 Aug 2018 08:20), Max: 98.4 (07 Aug 2018 04:58)  HR: 57 (07 Aug 2018 08:20) (57 - 66)  BP: 155/83 (07 Aug 2018 08:20) (127/79 - 166/72)  BP(mean): --  RR: 18 (07 Aug 2018 08:20) (17 - 19)  SpO2: 98% (07 Aug 2018 08:20) (95% - 98%)    PHYSICAL EXAM:  GENERAL: NAD  HEENT:  EOMI, small non-bleeding ulcer on right lateral side of mouth  CHEST/LUNG:  CTA b/l, no rales, wheezes, or rhonchi  HEART:  RRR, S1, S2  ABDOMEN:  BS+, soft, nontender, nondistended  EXTREMITIES: no edema, cyanosis, or calf tenderness. +eccymoses bilaterally at IV site  NERVOUS SYSTEM: Alert and Oriented, no focal deficits  SKIN: +petechiae LE below the knee b/l      MEDICATIONS  (STANDING):  dextrose 5%. 1000 milliLiter(s) (50 mL/Hr) IV Continuous <Continuous>  dextrose 50% Injectable 12.5 Gram(s) IV Push once  dextrose 50% Injectable 25 Gram(s) IV Push once  dextrose 50% Injectable 25 Gram(s) IV Push once  docusate sodium 100 milliGRAM(s) Oral two times a day  insulin lispro (HumaLOG) corrective regimen sliding scale   SubCutaneous three times a day before meals  insulin lispro (HumaLOG) corrective regimen sliding scale   SubCutaneous at bedtime  levothyroxine 75 MICROGram(s) Oral daily  metoprolol succinate ER 25 milliGRAM(s) Oral daily  multivitamin 1 Tablet(s) Oral daily  pantoprazole    Tablet 40 milliGRAM(s) Oral before breakfast  simvastatin 40 milliGRAM(s) Oral at bedtime    MEDICATIONS  (PRN):  dextrose 40% Gel 15 Gram(s) Oral once PRN Blood Glucose LESS THAN 70 milliGRAM(s)/deciliter  diphenhydrAMINE   Capsule 25 milliGRAM(s) Oral at bedtime PRN Insomnia  glucagon  Injectable 1 milliGRAM(s) IntraMuscular once PRN Glucose LESS THAN 70 milligrams/deciliter      Allergies    No Known Allergies    Intolerances          LABS:                        12.9   9.49  )-----------( 16       ( 07 Aug 2018 06:48 )             37.1     CBC Full  -  ( 07 Aug 2018 06:48 )  WBC Count : 9.49 K/uL  Hemoglobin : 12.9 g/dL  Hematocrit : 37.1 %  Platelet Count - Automated : 16 K/uL  Mean Cell Volume : 87.7 fl  Mean Cell Hemoglobin : 30.5 pg  Mean Cell Hemoglobin Concentration : 34.8 gm/dL  Auto Neutrophil # : x  Auto Lymphocyte # : x  Auto Monocyte # : x  Auto Eosinophil # : x  Auto Basophil # : x  Auto Neutrophil % : x  Auto Lymphocyte % : x  Auto Monocyte % : x  Auto Eosinophil % : x  Auto Basophil % : x    07 Aug 2018 06:48    140    |  105    |  29     ----------------------------<  175    3.6     |  25     |  1.20     Ca    8.5        07 Aug 2018 06:48          CAPILLARY BLOOD GLUCOSE      POCT Blood Glucose.: 149 mg/dL (07 Aug 2018 08:00)  POCT Blood Glucose.: 140 mg/dL (06 Aug 2018 20:56)  POCT Blood Glucose.: 235 mg/dL (06 Aug 2018 16:58)  POCT Blood Glucose.: 179 mg/dL (06 Aug 2018 11:51)          RADIOLOGY & ADDITIONAL TESTS:    Personally reviewed.     Consultant(s) Notes Reviewed:  [x] YES  [ ] NO    Care Discussed with [x] Consultants  [x] Patient  [x] Family  [ ]      [ ] Other; RN  DVT ppx

## 2018-08-07 NOTE — PROGRESS NOTE ADULT - SUBJECTIVE AND OBJECTIVE BOX
Patient is a 84y old  Male who presents with a chief complaint of Petechial Rash and Bleeding Oral Ulcer (06 Aug 2018 16:01)      INTERVAL HPI/OVERNIGHT EVENTS: No acute events o/n. Patient seen and examined at bedside. Denies new rashes, oral bleeding, vision changes, SOB, CP, difficulty swallowing, n/v, leg pain or itching. Rash on legs persist    MEDICATIONS  (STANDING):  dextrose 5%. 1000 milliLiter(s) (50 mL/Hr) IV Continuous <Continuous>  dextrose 50% Injectable 12.5 Gram(s) IV Push once  dextrose 50% Injectable 25 Gram(s) IV Push once  dextrose 50% Injectable 25 Gram(s) IV Push once  docusate sodium 100 milliGRAM(s) Oral two times a day  insulin lispro (HumaLOG) corrective regimen sliding scale   SubCutaneous three times a day before meals  insulin lispro (HumaLOG) corrective regimen sliding scale   SubCutaneous at bedtime  levothyroxine 75 MICROGram(s) Oral daily  metoprolol succinate ER 25 milliGRAM(s) Oral daily  multivitamin 1 Tablet(s) Oral daily  pantoprazole    Tablet 40 milliGRAM(s) Oral before breakfast  simvastatin 40 milliGRAM(s) Oral at bedtime    MEDICATIONS  (PRN):  dextrose 40% Gel 15 Gram(s) Oral once PRN Blood Glucose LESS THAN 70 milliGRAM(s)/deciliter  diphenhydrAMINE   Capsule 25 milliGRAM(s) Oral at bedtime PRN Insomnia  glucagon  Injectable 1 milliGRAM(s) IntraMuscular once PRN Glucose LESS THAN 70 milligrams/deciliter      Allergies    No Known Allergies    Intolerances        REVIEW OF SYSTEMS:  CONSTITUTIONAL: No fever or chills  HEENT:  No headache, no sore throat. Mouth sore improved  RESPIRATORY: No cough, wheezing, or shortness of breath  CARDIOVASCULAR: No chest pain, palpitations, or leg swelling  GASTROINTESTINAL: No abd pain, nausea, vomiting, or diarrhea  GENITOURINARY: No dysuria, frequency, or hematuria  NEUROLOGICAL: no focal weakness or dizziness  MUSCULOSKELETAL: no myalgias   SKIN: rash on legs bilaterally    Vital Signs Last 24 Hrs  T(C): 36.4 (07 Aug 2018 08:20), Max: 36.9 (07 Aug 2018 04:58)  T(F): 97.6 (07 Aug 2018 08:20), Max: 98.4 (07 Aug 2018 04:58)  HR: 57 (07 Aug 2018 08:20) (57 - 66)  BP: 155/83 (07 Aug 2018 08:20) (127/79 - 166/72)  BP(mean): --  RR: 18 (07 Aug 2018 08:20) (17 - 19)  SpO2: 98% (07 Aug 2018 08:20) (95% - 98%)    PHYSICAL EXAM:  GENERAL: NAD  HEENT:  EOMI, moist mucous membranes  CHEST/LUNG:  CTA b/l, no rales, wheezes, or rhonchi  HEART:  RRR, S1, S2  ABDOMEN:  BS+, soft, nontender, nondistended  EXTREMITIES: no edema, cyanosis, or calf tenderness. +eccymoses bilaterally at IV site  NERVOUS SYSTEM: Alert and Oriented  SKIN: +petechiae LE below the knee b/l    LABS:                        12.9   9.49  )-----------( 16       ( 07 Aug 2018 06:48 )             37.1     CBC Full  -  ( 07 Aug 2018 06:48 )  WBC Count : 9.49 K/uL  Hemoglobin : 12.9 g/dL  Hematocrit : 37.1 %  Platelet Count - Automated : 16 K/uL  Mean Cell Volume : 87.7 fl  Mean Cell Hemoglobin : 30.5 pg  Mean Cell Hemoglobin Concentration : 34.8 gm/dL  Auto Neutrophil # : x  Auto Lymphocyte # : x  Auto Monocyte # : x  Auto Eosinophil # : x  Auto Basophil # : x  Auto Neutrophil % : x  Auto Lymphocyte % : x  Auto Monocyte % : x  Auto Eosinophil % : x  Auto Basophil % : x    07 Aug 2018 06:48    140    |  105    |  29     ----------------------------<  175    3.6     |  25     |  1.20     Ca    8.5        07 Aug 2018 06:48          CAPILLARY BLOOD GLUCOSE      POCT Blood Glucose.: 149 mg/dL (07 Aug 2018 08:00)  POCT Blood Glucose.: 140 mg/dL (06 Aug 2018 20:56)  POCT Blood Glucose.: 235 mg/dL (06 Aug 2018 16:58)  POCT Blood Glucose.: 179 mg/dL (06 Aug 2018 11:51)          RADIOLOGY & ADDITIONAL TESTS:    Personally reviewed.     Consultant(s) Notes Reviewed:  [x] YES  [ ] NO    Care Discussed with [x] Consultants  [x] Patient  [ ] Family  [ ]      [ ] Other; RN  DVT ppx

## 2018-08-07 NOTE — PROGRESS NOTE ADULT - PROBLEM SELECTOR PLAN 9
- Hold home aspirin until platelet count is > 50,000.  - Patient ordered lozenges for complaint of hoarse throat.  - DVT ppx: None required as patient is ambulating. SCDs d/tarsha as will worsen petechial rash.

## 2018-08-07 NOTE — PROGRESS NOTE ADULT - PROBLEM SELECTOR PLAN 9
- Hold home aspirin until platelet count is > 50,000.  - DVT ppx: None required as patient is ambulating. SCDs d/tarsha as will worsen petechial rash.

## 2018-08-08 ENCOUNTER — RESULT REVIEW (OUTPATIENT)
Age: 83
End: 2018-08-08

## 2018-08-08 LAB
ALBUMIN SERPL ELPH-MCNC: 2.8 G/DL — LOW (ref 3.3–5)
ALP SERPL-CCNC: 53 U/L — SIGNIFICANT CHANGE UP (ref 40–120)
ALT FLD-CCNC: 21 U/L — SIGNIFICANT CHANGE UP (ref 12–78)
AST SERPL-CCNC: 25 U/L — SIGNIFICANT CHANGE UP (ref 15–37)
BILIRUB DIRECT SERPL-MCNC: 0.2 MG/DL — SIGNIFICANT CHANGE UP (ref 0.05–0.2)
BILIRUB INDIRECT FLD-MCNC: 0.8 MG/DL — SIGNIFICANT CHANGE UP (ref 0.2–1)
BILIRUB SERPL-MCNC: 1 MG/DL — SIGNIFICANT CHANGE UP (ref 0.2–1.2)
HCT VFR BLD CALC: 39.2 % — SIGNIFICANT CHANGE UP (ref 39–50)
HGB BLD-MCNC: 13.6 G/DL — SIGNIFICANT CHANGE UP (ref 13–17)
MCHC RBC-ENTMCNC: 30.5 PG — SIGNIFICANT CHANGE UP (ref 27–34)
MCHC RBC-ENTMCNC: 34.7 GM/DL — SIGNIFICANT CHANGE UP (ref 32–36)
MCV RBC AUTO: 87.9 FL — SIGNIFICANT CHANGE UP (ref 80–100)
NRBC # BLD: 0 /100 WBCS — SIGNIFICANT CHANGE UP (ref 0–0)
PLATELET # BLD AUTO: 8 K/UL — CRITICAL LOW (ref 150–400)
PROT SERPL-MCNC: 8.2 G/DL — SIGNIFICANT CHANGE UP (ref 6–8.3)
RBC # BLD: 4.46 M/UL — SIGNIFICANT CHANGE UP (ref 4.2–5.8)
RBC # FLD: 13.1 % — SIGNIFICANT CHANGE UP (ref 10.3–14.5)
WBC # BLD: 7.32 K/UL — SIGNIFICANT CHANGE UP (ref 3.8–10.5)
WBC # FLD AUTO: 7.32 K/UL — SIGNIFICANT CHANGE UP (ref 3.8–10.5)

## 2018-08-08 PROCEDURE — 99233 SBSQ HOSP IP/OBS HIGH 50: CPT | Mod: GC

## 2018-08-08 RX ORDER — ACETAMINOPHEN 500 MG
650 TABLET ORAL ONCE
Qty: 0 | Refills: 0 | Status: COMPLETED | OUTPATIENT
Start: 2018-08-08 | End: 2018-08-08

## 2018-08-08 RX ORDER — LIDOCAINE HCL 20 MG/ML
10 VIAL (ML) INJECTION ONCE
Qty: 0 | Refills: 0 | Status: COMPLETED | OUTPATIENT
Start: 2018-08-08 | End: 2018-08-08

## 2018-08-08 RX ADMIN — Medication 650 MILLIGRAM(S): at 16:48

## 2018-08-08 RX ADMIN — Medication 1: at 08:23

## 2018-08-08 RX ADMIN — Medication 650 MILLIGRAM(S): at 17:10

## 2018-08-08 RX ADMIN — Medication 100 MILLIGRAM(S): at 18:29

## 2018-08-08 RX ADMIN — Medication 1 TABLET(S): at 11:29

## 2018-08-08 RX ADMIN — Medication 100 MILLIGRAM(S): at 05:50

## 2018-08-08 RX ADMIN — BENZOCAINE AND MENTHOL 1 LOZENGE: 5; 1 LIQUID ORAL at 08:25

## 2018-08-08 RX ADMIN — Medication 75 MICROGRAM(S): at 05:50

## 2018-08-08 RX ADMIN — BENZOCAINE AND MENTHOL 1 LOZENGE: 5; 1 LIQUID ORAL at 11:29

## 2018-08-08 RX ADMIN — PANTOPRAZOLE SODIUM 40 MILLIGRAM(S): 20 TABLET, DELAYED RELEASE ORAL at 05:50

## 2018-08-08 RX ADMIN — Medication 25 MILLIGRAM(S): at 22:19

## 2018-08-08 RX ADMIN — Medication 10 MILLILITER(S): at 17:35

## 2018-08-08 RX ADMIN — BENZOCAINE AND MENTHOL 1 LOZENGE: 5; 1 LIQUID ORAL at 20:15

## 2018-08-08 RX ADMIN — SIMVASTATIN 40 MILLIGRAM(S): 20 TABLET, FILM COATED ORAL at 22:19

## 2018-08-08 NOTE — PROGRESS NOTE ADULT - PROBLEM SELECTOR PLAN 5
- Hypertensive -- likely exacerbated by the pulse-dose steroids.   - Continue home metoprolol. - Currently stable at 121/75.  - Continue home metoprolol. - Currently stable   - Continue home metoprolol.

## 2018-08-08 NOTE — PROCEDURE NOTE - ADDITIONAL PROCEDURE DETAILS
aspirate , biopsy and clot was dropped at pathology department   flow cytometry, cytogenetics, FISH were ordered   path evaluation

## 2018-08-08 NOTE — PROGRESS NOTE ADULT - PROBLEM SELECTOR PLAN 1
- Likely due to ITP, possibly secondary to augmentin use 2 weeks ago.  - AM platelet level decreased rapidly to 8 after a slow uptrend yesterday (15 -> 16 -> 17)  - s/p 2 day course of IVIG; s/p 4 day course pulse dose steroids.  - plan for bone marrow biopsy today - consented for blood products and will receive one unit platelets prior to biopsy. - Likely due to ITP, possibly secondary to augmentin use 2 weeks ago.  - AM platelet level decreased to 8 overnight after a slow uptrend yesterday (15 -> 16 -> 17)  - s/p 2 day course of IVIG; s/p 4 day course pulse dose steroids.  - plan for bone marrow biopsy today - consented for blood products and will receive one unit platelets prior to biopsy. - Likely due to ITP, possibly secondary to augmentin use 2 weeks ago.  - completed 2 day course of IVIG; and 4 day course of pulse dose steroids, but now platelet count below 10K again, so perhaps refractory ITP  - plan for bone marrow biopsy today - consented for blood products and will receive one unit platelets prior to biopsy.

## 2018-08-08 NOTE — PROGRESS NOTE ADULT - PROBLEM SELECTOR PLAN 1
- Likely due to ITP, possibly secondary to augmentin use 2 weeks ago.  - AM platelet level decreased rapidly to 8 after a slow uptrend yesterday (15 -> 16 -> 17)  - heme recs appreciated  - s/p IVIG, completed pulse dose steroids yesterday 8/7  - platelet transfusion as per heme

## 2018-08-08 NOTE — PROGRESS NOTE ADULT - ASSESSMENT
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 Daryl Allred, now admitted with 1 day history of petichial rash on LEs and bleeding from oral muco-cutaneous tissue; platelet count <3 documented and confirmed with review of peripheral blood smear with completely normal WBC and Hg level; working diagnosis of ITP        - no role for transfusion of platelets as patient does not have life threatening bleeding  - Decadron 40mg IV X4 days and IVIG 1g/kg IV X2 days  - hold Aspirin until platelet count >50K  - would monitor blood glucose while on high dose pulse decadron  - please start PPI  - follow CBC daily 83 yo male with history of Hairy Cell leukemia in 2010 s/p treatment with cladribine CIVI for 7 days and in remission since under the care of Dr. 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

## 2018-08-08 NOTE — PROGRESS NOTE ADULT - SUBJECTIVE AND OBJECTIVE BOX
Interval History:    Chart reviewed and events noted;   ROS:negative exept    MEDICATIONS  (STANDING):  benzocaine 15 mG/menthol 3.6 mG Lozenge 1 Lozenge Oral five times a day  dextrose 5%. 1000 milliLiter(s) (50 mL/Hr) IV Continuous <Continuous>  dextrose 50% Injectable 12.5 Gram(s) IV Push once  dextrose 50% Injectable 25 Gram(s) IV Push once  dextrose 50% Injectable 25 Gram(s) IV Push once  docusate sodium 100 milliGRAM(s) Oral two times a day  insulin lispro (HumaLOG) corrective regimen sliding scale   SubCutaneous three times a day before meals  insulin lispro (HumaLOG) corrective regimen sliding scale   SubCutaneous at bedtime  levothyroxine 75 MICROGram(s) Oral daily  metoprolol succinate ER 25 milliGRAM(s) Oral daily  multivitamin 1 Tablet(s) Oral daily  pantoprazole    Tablet 40 milliGRAM(s) Oral before breakfast  simvastatin 40 milliGRAM(s) Oral at bedtime    MEDICATIONS  (PRN):  dextrose 40% Gel 15 Gram(s) Oral once PRN Blood Glucose LESS THAN 70 milliGRAM(s)/deciliter  diphenhydrAMINE   Capsule 25 milliGRAM(s) Oral at bedtime PRN Insomnia  glucagon  Injectable 1 milliGRAM(s) IntraMuscular once PRN Glucose LESS THAN 70 milligrams/deciliter      Vital Signs Last 24 Hrs  T(C): 36.7 (08 Aug 2018 11:54), Max: 36.9 (07 Aug 2018 19:52)  T(F): 98 (08 Aug 2018 11:54), Max: 98.5 (07 Aug 2018 19:52)  HR: 56 (08 Aug 2018 11:54) (52 - 58)  BP: 121/75 (08 Aug 2018 11:54) (121/75 - 147/87)  BP(mean): --  RR: 18 (08 Aug 2018 11:54) (18 - 18)  SpO2: 98% (08 Aug 2018 11:54) (95% - 98%)    PHYSICAL EXAM  General: adult in NAD  HEENT: clear oropharynx, anicteric sclera, pink conjunctivae  Neck: supple  CV: normal S1S2 with no murmur rubs or gallops  Lungs: clear to auscultation, no wheezes, no rhales  Abdomen: soft non-tender non-distended, no hepato/splenomegaly  Ext: no clubbing cyanosis or edema  Skin: no rashes and no petichiae  Neuro: alert and oriented X3 no focal deficits      LABS:                        13.6   7.32  )-----------( 8        ( 08 Aug 2018 07:21 )             39.2     08-07    140  |  105  |  29<H>  ----------------------------<  175<H>  3.6   |  25  |  1.20    Ca    8.5      07 Aug 2018 06:48 Interval History:    Chart reviewed and events noted;   no acute events   plt dropped today   no signs of bleeding   MEDICATIONS  (STANDING):  benzocaine 15 mG/menthol 3.6 mG Lozenge 1 Lozenge Oral five times a day  dextrose 5%. 1000 milliLiter(s) (50 mL/Hr) IV Continuous <Continuous>  dextrose 50% Injectable 12.5 Gram(s) IV Push once  dextrose 50% Injectable 25 Gram(s) IV Push once  dextrose 50% Injectable 25 Gram(s) IV Push once  docusate sodium 100 milliGRAM(s) Oral two times a day  insulin lispro (HumaLOG) corrective regimen sliding scale   SubCutaneous three times a day before meals  insulin lispro (HumaLOG) corrective regimen sliding scale   SubCutaneous at bedtime  levothyroxine 75 MICROGram(s) Oral daily  metoprolol succinate ER 25 milliGRAM(s) Oral daily  multivitamin 1 Tablet(s) Oral daily  pantoprazole    Tablet 40 milliGRAM(s) Oral before breakfast  simvastatin 40 milliGRAM(s) Oral at bedtime    MEDICATIONS  (PRN):  dextrose 40% Gel 15 Gram(s) Oral once PRN Blood Glucose LESS THAN 70 milliGRAM(s)/deciliter  diphenhydrAMINE   Capsule 25 milliGRAM(s) Oral at bedtime PRN Insomnia  glucagon  Injectable 1 milliGRAM(s) IntraMuscular once PRN Glucose LESS THAN 70 milligrams/deciliter      Vital Signs Last 24 Hrs  T(C): 36.7 (08 Aug 2018 11:54), Max: 36.9 (07 Aug 2018 19:52)  T(F): 98 (08 Aug 2018 11:54), Max: 98.5 (07 Aug 2018 19:52)  HR: 56 (08 Aug 2018 11:54) (52 - 58)  BP: 121/75 (08 Aug 2018 11:54) (121/75 - 147/87)  BP(mean): --  RR: 18 (08 Aug 2018 11:54) (18 - 18)  SpO2: 98% (08 Aug 2018 11:54) (95% - 98%)    PHYSICAL EXAM  General: adult in NAD  HEENT: healed blisters oropharynx, anicteric sclera, pink conjunctivae  Neck: supple  CV: normal S1S2 with no murmur rubs or gallops  Lungs: clear to auscultation, no wheezes, no rales  Abdomen: soft non-tender non-distended, no hepatosplenomegaly  Ext: no clubbing cyanosis or edema  Skin: no rashes and mild LE  Neuro: alert and oriented X3 no focal deficits      LABS:                        13.6   7.32  )-----------( 8        ( 08 Aug 2018 07:21 )             39.2     08-07    140  |  105  |  29<H>  ----------------------------<  175<H>  3.6   |  25  |  1.20    Ca    8.5      07 Aug 2018 06:48

## 2018-08-08 NOTE — PROGRESS NOTE ADULT - PROBLEM SELECTOR PLAN 9
- Hold home aspirin until platelet count is > 50,000.  - lozenges for complaint of hoarse throat.  - DVT ppx: None required as patient is ambulating. SCDs d/tarsha as will worsen petechial rash.

## 2018-08-08 NOTE — PROGRESS NOTE ADULT - SUBJECTIVE AND OBJECTIVE BOX
Patient is a 84y old  Male who presents with a chief complaint of Petechial Rash and Bleeding Oral Ulcer (06 Aug 2018 16:01)      INTERVAL HPI/OVERNIGHT EVENTS: No acute events overnight. Platelet count dropped 17 --> 8. Denies new rashes, oral bleeding, vision changes, SOB, CP, difficulty swallowing, n/v, leg pain or itching. Rash on legs persist.    REVIEW OF SYSTEMS:  CONSTITUTIONAL: No fever or chills  HEENT:  No headache, no sore throat. Mouth sore improved  RESPIRATORY: No cough, wheezing, or shortness of breath  CARDIOVASCULAR: No chest pain, palpitations.  GASTROINTESTINAL: No abd pain, nausea, vomiting, or diarrhea  SKIN: rash on legs persist    Vital Signs Last 24 Hrs  T(C): 36.6 (08 Aug 2018 07:39), Max: 36.9 (07 Aug 2018 19:52)  T(F): 97.8 (08 Aug 2018 07:39), Max: 98.5 (07 Aug 2018 19:52)  HR: 57 (08 Aug 2018 07:39) (52 - 58)  BP: 142/88 (08 Aug 2018 07:39) (139/75 - 154/79)  BP(mean): --  RR: 18 (08 Aug 2018 07:39) (17 - 18)  SpO2: 95% (08 Aug 2018 07:39) (95% - 98%)    PHYSICAL EXAM:  GENERAL: NAD  HEENT:  EOMI, small non-bleeding ulcer on right lateral side of mouth  CHEST/LUNG:  CTA b/l, no rales, wheezes, or rhonchi  HEART:  RRR, S1, S2  ABDOMEN:  BS+, soft, nontender, nondistended  EXTREMITIES: no edema, cyanosis, or calf tenderness  NERVOUS SYSTEM: AA&Ox3, no focal deficits  SKIN: + non blanching petechial rash on bilateral LE below the knee     MEDICATIONS  (STANDING):  benzocaine 15 mG/menthol 3.6 mG Lozenge 1 Lozenge Oral five times a day  dextrose 5%. 1000 milliLiter(s) (50 mL/Hr) IV Continuous <Continuous>  dextrose 50% Injectable 12.5 Gram(s) IV Push once  dextrose 50% Injectable 25 Gram(s) IV Push once  dextrose 50% Injectable 25 Gram(s) IV Push once  docusate sodium 100 milliGRAM(s) Oral two times a day  insulin lispro (HumaLOG) corrective regimen sliding scale   SubCutaneous three times a day before meals  insulin lispro (HumaLOG) corrective regimen sliding scale   SubCutaneous at bedtime  levothyroxine 75 MICROGram(s) Oral daily  metoprolol succinate ER 25 milliGRAM(s) Oral daily  multivitamin 1 Tablet(s) Oral daily  pantoprazole    Tablet 40 milliGRAM(s) Oral before breakfast  simvastatin 40 milliGRAM(s) Oral at bedtime    MEDICATIONS  (PRN):  dextrose 40% Gel 15 Gram(s) Oral once PRN Blood Glucose LESS THAN 70 milliGRAM(s)/deciliter  diphenhydrAMINE   Capsule 25 milliGRAM(s) Oral at bedtime PRN Insomnia  glucagon  Injectable 1 milliGRAM(s) IntraMuscular once PRN Glucose LESS THAN 70 milligrams/deciliter      Allergies    No Known Allergies    Intolerances    LABS:                        13.6   7.32  )-----------( 8        ( 08 Aug 2018 07:21 )             39.2     CBC Full  -  ( 08 Aug 2018 07:21 )  WBC Count : 7.32 K/uL  Hemoglobin : 13.6 g/dL  Hematocrit : 39.2 %  Platelet Count - Automated : 8 K/uL  Mean Cell Volume : 87.9 fl  Mean Cell Hemoglobin : 30.5 pg  Mean Cell Hemoglobin Concentration : 34.7 gm/dL  Auto Neutrophil # : x  Auto Lymphocyte # : x  Auto Monocyte # : x  Auto Eosinophil # : x  Auto Basophil # : x  Auto Neutrophil % : x  Auto Lymphocyte % : x  Auto Monocyte % : x  Auto Eosinophil % : x  Auto Basophil % : x      Ca    8.5        07 Aug 2018 06:48          CAPILLARY BLOOD GLUCOSE      POCT Blood Glucose.: 130 mg/dL (08 Aug 2018 11:16)  POCT Blood Glucose.: 157 mg/dL (08 Aug 2018 07:57)  POCT Blood Glucose.: 164 mg/dL (07 Aug 2018 21:03)  POCT Blood Glucose.: 228 mg/dL (07 Aug 2018 16:51)          RADIOLOGY & ADDITIONAL TESTS:    Personally reviewed.     Consultant(s) Notes Reviewed:  [x] YES  [ ] NO    Care Discussed with [x] Consultants  [x] Patient  [ ] Family  [ ]      [ ] Other; RN  DVT ppx Patient is a 84y old  Male who presents with a chief complaint of Petechial Rash and Bleeding Oral Ulcer (06 Aug 2018 16:01)      INTERVAL HPI/OVERNIGHT EVENTS: No acute events overnight. Platelet count dropped 17 --> 8. Denies new rashes, oral bleeding, vision changes, SOB, CP, difficulty swallowing, n/v, leg pain or itching. Rash on legs persists.    REVIEW OF SYSTEMS:  CONSTITUTIONAL: No fever or chills  HEENT:  No headache, no sore throat. Mouth sore improved  RESPIRATORY: No cough, wheezing, or shortness of breath  CARDIOVASCULAR: No chest pain, palpitations.  GASTROINTESTINAL: No abd pain, nausea, vomiting, or diarrhea  SKIN: rash on legs persist    Vital Signs Last 24 Hrs  T(C): 36.6 (08 Aug 2018 07:39), Max: 36.9 (07 Aug 2018 19:52)  T(F): 97.8 (08 Aug 2018 07:39), Max: 98.5 (07 Aug 2018 19:52)  HR: 57 (08 Aug 2018 07:39) (52 - 58)  BP: 142/88 (08 Aug 2018 07:39) (139/75 - 154/79)  BP(mean): --  RR: 18 (08 Aug 2018 07:39) (17 - 18)  SpO2: 95% (08 Aug 2018 07:39) (95% - 98%)    PHYSICAL EXAM:  GENERAL: NAD  HEENT:  EOMI, small non-bleeding ulcer on right lateral side of mouth  CHEST/LUNG:  CTA b/l, no rales, wheezes, or rhonchi  HEART:  RRR, S1, S2  ABDOMEN:  BS+, soft, nontender, nondistended  EXTREMITIES: no edema, cyanosis, or calf tenderness  NERVOUS SYSTEM: AA&Ox3, no focal deficits  SKIN: + non blanching petechial rash on bilateral LE below the knee     MEDICATIONS  (STANDING):  benzocaine 15 mG/menthol 3.6 mG Lozenge 1 Lozenge Oral five times a day  dextrose 5%. 1000 milliLiter(s) (50 mL/Hr) IV Continuous <Continuous>  dextrose 50% Injectable 12.5 Gram(s) IV Push once  dextrose 50% Injectable 25 Gram(s) IV Push once  dextrose 50% Injectable 25 Gram(s) IV Push once  docusate sodium 100 milliGRAM(s) Oral two times a day  insulin lispro (HumaLOG) corrective regimen sliding scale   SubCutaneous three times a day before meals  insulin lispro (HumaLOG) corrective regimen sliding scale   SubCutaneous at bedtime  levothyroxine 75 MICROGram(s) Oral daily  metoprolol succinate ER 25 milliGRAM(s) Oral daily  multivitamin 1 Tablet(s) Oral daily  pantoprazole    Tablet 40 milliGRAM(s) Oral before breakfast  simvastatin 40 milliGRAM(s) Oral at bedtime    MEDICATIONS  (PRN):  dextrose 40% Gel 15 Gram(s) Oral once PRN Blood Glucose LESS THAN 70 milliGRAM(s)/deciliter  diphenhydrAMINE   Capsule 25 milliGRAM(s) Oral at bedtime PRN Insomnia  glucagon  Injectable 1 milliGRAM(s) IntraMuscular once PRN Glucose LESS THAN 70 milligrams/deciliter      Allergies    No Known Allergies    Intolerances    LABS:                        13.6   7.32  )-----------( 8        ( 08 Aug 2018 07:21 )             39.2     CBC Full  -  ( 08 Aug 2018 07:21 )  WBC Count : 7.32 K/uL  Hemoglobin : 13.6 g/dL  Hematocrit : 39.2 %  Platelet Count - Automated : 8 K/uL  Mean Cell Volume : 87.9 fl  Mean Cell Hemoglobin : 30.5 pg  Mean Cell Hemoglobin Concentration : 34.7 gm/dL  Auto Neutrophil # : x  Auto Lymphocyte # : x  Auto Monocyte # : x  Auto Eosinophil # : x  Auto Basophil # : x  Auto Neutrophil % : x  Auto Lymphocyte % : x  Auto Monocyte % : x  Auto Eosinophil % : x  Auto Basophil % : x      Ca    8.5        07 Aug 2018 06:48          CAPILLARY BLOOD GLUCOSE      POCT Blood Glucose.: 130 mg/dL (08 Aug 2018 11:16)  POCT Blood Glucose.: 157 mg/dL (08 Aug 2018 07:57)  POCT Blood Glucose.: 164 mg/dL (07 Aug 2018 21:03)  POCT Blood Glucose.: 228 mg/dL (07 Aug 2018 16:51)          RADIOLOGY & ADDITIONAL TESTS:    Personally reviewed.     Consultant(s) Notes Reviewed:  [x] YES  [ ] NO    Care Discussed with [x] Consultants  [x] Patient  [x] Family  [ ]      [ ] Other; RN  DVT ppx

## 2018-08-08 NOTE — PROGRESS NOTE ADULT - ASSESSMENT
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 Clinically improving with platelets trending up.

## 2018-08-09 LAB
ANION GAP SERPL CALC-SCNC: 9 MMOL/L — SIGNIFICANT CHANGE UP (ref 5–17)
BUN SERPL-MCNC: 26 MG/DL — HIGH (ref 7–23)
CALCIUM SERPL-MCNC: 8.4 MG/DL — LOW (ref 8.5–10.1)
CHLORIDE SERPL-SCNC: 105 MMOL/L — SIGNIFICANT CHANGE UP (ref 96–108)
CO2 SERPL-SCNC: 27 MMOL/L — SIGNIFICANT CHANGE UP (ref 22–31)
CREAT SERPL-MCNC: 1 MG/DL — SIGNIFICANT CHANGE UP (ref 0.5–1.3)
GLUCOSE SERPL-MCNC: 95 MG/DL — SIGNIFICANT CHANGE UP (ref 70–99)
HCT VFR BLD CALC: 41.5 % — SIGNIFICANT CHANGE UP (ref 39–50)
HGB BLD-MCNC: 14.3 G/DL — SIGNIFICANT CHANGE UP (ref 13–17)
LDH SERPL L TO P-CCNC: 198 U/L — SIGNIFICANT CHANGE UP (ref 50–242)
MCHC RBC-ENTMCNC: 30.1 PG — SIGNIFICANT CHANGE UP (ref 27–34)
MCHC RBC-ENTMCNC: 34.5 GM/DL — SIGNIFICANT CHANGE UP (ref 32–36)
MCV RBC AUTO: 87.4 FL — SIGNIFICANT CHANGE UP (ref 80–100)
NRBC # BLD: 0 /100 WBCS — SIGNIFICANT CHANGE UP (ref 0–0)
PLATELET # BLD AUTO: <3 K/UL — CRITICAL LOW (ref 150–400)
POTASSIUM SERPL-MCNC: 4 MMOL/L — SIGNIFICANT CHANGE UP (ref 3.5–5.3)
POTASSIUM SERPL-SCNC: 4 MMOL/L — SIGNIFICANT CHANGE UP (ref 3.5–5.3)
RBC # BLD: 4.75 M/UL — SIGNIFICANT CHANGE UP (ref 4.2–5.8)
RBC # FLD: 13.1 % — SIGNIFICANT CHANGE UP (ref 10.3–14.5)
SODIUM SERPL-SCNC: 141 MMOL/L — SIGNIFICANT CHANGE UP (ref 135–145)
WBC # BLD: 4.91 K/UL — SIGNIFICANT CHANGE UP (ref 3.8–10.5)
WBC # FLD AUTO: 4.91 K/UL — SIGNIFICANT CHANGE UP (ref 3.8–10.5)

## 2018-08-09 PROCEDURE — 99233 SBSQ HOSP IP/OBS HIGH 50: CPT | Mod: GC

## 2018-08-09 RX ADMIN — Medication 25 MILLIGRAM(S): at 22:39

## 2018-08-09 RX ADMIN — Medication 100 MILLIGRAM(S): at 05:35

## 2018-08-09 RX ADMIN — Medication 0: at 22:39

## 2018-08-09 RX ADMIN — SIMVASTATIN 40 MILLIGRAM(S): 20 TABLET, FILM COATED ORAL at 22:39

## 2018-08-09 RX ADMIN — Medication 75 MICROGRAM(S): at 05:35

## 2018-08-09 RX ADMIN — Medication 1 TABLET(S): at 11:33

## 2018-08-09 RX ADMIN — Medication 60 MILLIGRAM(S): at 17:32

## 2018-08-09 RX ADMIN — Medication 100 MILLIGRAM(S): at 17:31

## 2018-08-09 RX ADMIN — Medication 25 MILLIGRAM(S): at 05:35

## 2018-08-09 RX ADMIN — PANTOPRAZOLE SODIUM 40 MILLIGRAM(S): 20 TABLET, DELAYED RELEASE ORAL at 05:35

## 2018-08-09 NOTE — PROGRESS NOTE ADULT - PROBLEM SELECTOR PLAN 9
- Hold home aspirin until platelet count is > 50,000.  - DVT ppx: None required as patient is ambulating. SCDs d/tarsha as will worsen petechial rash. - DVT ppx: None required as patient is ambulating. SCDs d/tarsha as will worsen petechial rash.

## 2018-08-09 NOTE — PROGRESS NOTE ADULT - ASSESSMENT
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 Platelet count <3 documented and confirmed with review of peripheral blood smear with completely normal WBC and Hg level. Bone marrow biopsy was done yesterday, pending results.

## 2018-08-09 NOTE — PROGRESS NOTE ADULT - PROBLEM SELECTOR PLAN 5
- Currently elevated (146/82)  - Continue home metoprolol. - Chronic - currently 146/82.  - Continue home metoprolol.

## 2018-08-09 NOTE — PROGRESS NOTE ADULT - SUBJECTIVE AND OBJECTIVE BOX
Patient is a 84y old  Male who presents with a chief complaint of Petechial Rash and Bleeding Oral Ulcer, and admitted for ITP      INTERVAL EVENTS: No acute events overnight. Platelet count dropped 8 ---> >3.0. Denies new rashed, oral bleeding, SOB, CP, n/v, or leg pain. Rash on legs persists.      REVIEW OF SYSTEMS:  CONSTITUTIONAL: No fever or chills  HEENT:  No headache, no sore throat  RESPIRATORY: No cough, wheezing, or shortness of breath  CARDIOVASCULAR: No chest pain, palpitations, or leg swelling  GASTROINTESTINAL: No abd pain, nausea, vomiting, or diarrhea  GENITOURINARY: No hematuria  SKIN: rash on legs persists    Vital Signs Last 24 Hrs  T(C): 36.9 (09 Aug 2018 08:50), Max: 37.1 (09 Aug 2018 04:38)  T(F): 98.4 (09 Aug 2018 08:50), Max: 98.7 (09 Aug 2018 04:38)  HR: 54 (09 Aug 2018 08:50) (50 - 59)  BP: 133/69 (09 Aug 2018 08:50) (121/75 - 147/72)  BP(mean): --  RR: 17 (09 Aug 2018 08:50) (16 - 18)  SpO2: 98% (09 Aug 2018 08:50) (95% - 98%)    PHYSICAL EXAM:  GENERAL: NAD  HEENT:  EOMI, small non-bleeding ulcer on right lateral side of mouth  CHEST/LUNG:  CTA b/l, no rales, wheezes, or rhonchi  HEART:  RRR, S1, S2  ABDOMEN:  BS+, soft, nontender, nondistended  EXTREMITIES: no edema, cyanosis, or calf tenderness  NERVOUS SYSTEM: AA&Ox3  SKIN: + non blanching petechial rash on bilateral LE below the knee     MEDICATIONS  (STANDING):  benzocaine 15 mG/menthol 3.6 mG Lozenge 1 Lozenge Oral five times a day  dextrose 5%. 1000 milliLiter(s) (50 mL/Hr) IV Continuous <Continuous>  dextrose 50% Injectable 12.5 Gram(s) IV Push once  dextrose 50% Injectable 25 Gram(s) IV Push once  dextrose 50% Injectable 25 Gram(s) IV Push once  docusate sodium 100 milliGRAM(s) Oral two times a day  insulin lispro (HumaLOG) corrective regimen sliding scale   SubCutaneous three times a day before meals  insulin lispro (HumaLOG) corrective regimen sliding scale   SubCutaneous at bedtime  levothyroxine 75 MICROGram(s) Oral daily  metoprolol succinate ER 25 milliGRAM(s) Oral daily  multivitamin 1 Tablet(s) Oral daily  pantoprazole    Tablet 40 milliGRAM(s) Oral before breakfast  simvastatin 40 milliGRAM(s) Oral at bedtime    MEDICATIONS  (PRN):  dextrose 40% Gel 15 Gram(s) Oral once PRN Blood Glucose LESS THAN 70 milliGRAM(s)/deciliter  diphenhydrAMINE   Capsule 25 milliGRAM(s) Oral at bedtime PRN Insomnia  glucagon  Injectable 1 milliGRAM(s) IntraMuscular once PRN Glucose LESS THAN 70 milligrams/deciliter      Allergies    No Known Allergies    Intolerances        LABS:                        14.3   4.91  )-----------( <3.0     ( 09 Aug 2018 08:06 )             41.5     CBC Full  -  ( 09 Aug 2018 08:06 )  WBC Count : 4.91 K/uL  Hemoglobin : 14.3 g/dL  Hematocrit : 41.5 %  Platelet Count - Automated : <3.0 K/uL  Mean Cell Volume : 87.4 fl  Mean Cell Hemoglobin : 30.1 pg  Mean Cell Hemoglobin Concentration : 34.5 gm/dL  Auto Neutrophil # : x  Auto Lymphocyte # : x  Auto Monocyte # : x  Auto Eosinophil # : x  Auto Basophil # : x  Auto Neutrophil % : x  Auto Lymphocyte % : x  Auto Monocyte % : x  Auto Eosinophil % : x  Auto Basophil % : x    09 Aug 2018 08:06    141    |  105    |  26     ----------------------------<  95     4.0     |  27     |  1.00     Ca    8.4        09 Aug 2018 08:06    TPro  8.2    /  Alb  2.8    /  TBili  1.0    /  DBili  .20    /  AST  25     /  ALT  21     /  AlkPhos  53     08 Aug 2018 13:04        CAPILLARY BLOOD GLUCOSE      POCT Blood Glucose.: 120 mg/dL (09 Aug 2018 11:34)  POCT Blood Glucose.: 130 mg/dL (09 Aug 2018 07:52)  POCT Blood Glucose.: 142 mg/dL (08 Aug 2018 22:04)  POCT Blood Glucose.: 130 mg/dL (08 Aug 2018 16:40)          RADIOLOGY & ADDITIONAL TESTS:    Personally reviewed.     Consultant(s) Notes Reviewed:  [x] YES  [ ] NO    Care Discussed with [x] Consultants  [x] Patient  [ ] Family  [ ]      [ ] Other; RN  DVT ppx Patient is a 84y old  Male who presents with a chief complaint of Petechial Rash and Bleeding Oral Ulcer, and admitted for ITP      INTERVAL EVENTS: No acute events overnight. Platelet count dropped 8 ---> >3.0. Denies new rashed, oral bleeding, SOB, CP, n/v, or leg pain. Rash on legs persists.      REVIEW OF SYSTEMS:  CONSTITUTIONAL: No fever or chills  HEENT:  No headache, no sore throat  RESPIRATORY: No cough, wheezing, or shortness of breath  CARDIOVASCULAR: No chest pain, palpitations, or leg swelling  GASTROINTESTINAL: No abd pain, nausea, vomiting, or diarrhea  GENITOURINARY: No hematuria  SKIN: Rash on legs persists    Vital Signs Last 24 Hrs  T(C): 36.9 (09 Aug 2018 08:50), Max: 37.1 (09 Aug 2018 04:38)  T(F): 98.4 (09 Aug 2018 08:50), Max: 98.7 (09 Aug 2018 04:38)  HR: 54 (09 Aug 2018 08:50) (50 - 59)  BP: 133/69 (09 Aug 2018 08:50) (121/75 - 147/72)  BP(mean): --  RR: 17 (09 Aug 2018 08:50) (16 - 18)  SpO2: 98% (09 Aug 2018 08:50) (95% - 98%)    PHYSICAL EXAM:  GENERAL: NAD  HEENT:  EOMI, small non-bleeding ulcer on right lateral side of mouth  CHEST/LUNG:  CTA b/l, no rales, wheezes, or rhonchi  HEART:  RRR, S1, S2  ABDOMEN:  BS+, soft, nontender, nondistended  EXTREMITIES: no edema, cyanosis, or calf tenderness  NERVOUS SYSTEM: AA&Ox3  SKIN: + non blanching petechial rash on bilateral LE below the knee     MEDICATIONS  (STANDING):  benzocaine 15 mG/menthol 3.6 mG Lozenge 1 Lozenge Oral five times a day  dextrose 5%. 1000 milliLiter(s) (50 mL/Hr) IV Continuous <Continuous>  dextrose 50% Injectable 12.5 Gram(s) IV Push once  dextrose 50% Injectable 25 Gram(s) IV Push once  dextrose 50% Injectable 25 Gram(s) IV Push once  docusate sodium 100 milliGRAM(s) Oral two times a day  insulin lispro (HumaLOG) corrective regimen sliding scale   SubCutaneous three times a day before meals  insulin lispro (HumaLOG) corrective regimen sliding scale   SubCutaneous at bedtime  levothyroxine 75 MICROGram(s) Oral daily  metoprolol succinate ER 25 milliGRAM(s) Oral daily  multivitamin 1 Tablet(s) Oral daily  pantoprazole    Tablet 40 milliGRAM(s) Oral before breakfast  simvastatin 40 milliGRAM(s) Oral at bedtime    MEDICATIONS  (PRN):  dextrose 40% Gel 15 Gram(s) Oral once PRN Blood Glucose LESS THAN 70 milliGRAM(s)/deciliter  diphenhydrAMINE   Capsule 25 milliGRAM(s) Oral at bedtime PRN Insomnia  glucagon  Injectable 1 milliGRAM(s) IntraMuscular once PRN Glucose LESS THAN 70 milligrams/deciliter      Allergies    No Known Allergies    Intolerances        LABS:                        14.3   4.91  )-----------( <3.0     ( 09 Aug 2018 08:06 )             41.5     CBC Full  -  ( 09 Aug 2018 08:06 )  WBC Count : 4.91 K/uL  Hemoglobin : 14.3 g/dL  Hematocrit : 41.5 %  Platelet Count - Automated : <3.0 K/uL  Mean Cell Volume : 87.4 fl  Mean Cell Hemoglobin : 30.1 pg  Mean Cell Hemoglobin Concentration : 34.5 gm/dL  Auto Neutrophil # : x  Auto Lymphocyte # : x  Auto Monocyte # : x  Auto Eosinophil # : x  Auto Basophil # : x  Auto Neutrophil % : x  Auto Lymphocyte % : x  Auto Monocyte % : x  Auto Eosinophil % : x  Auto Basophil % : x    09 Aug 2018 08:06    141    |  105    |  26     ----------------------------<  95     4.0     |  27     |  1.00     Ca    8.4        09 Aug 2018 08:06    TPro  8.2    /  Alb  2.8    /  TBili  1.0    /  DBili  .20    /  AST  25     /  ALT  21     /  AlkPhos  53     08 Aug 2018 13:04        CAPILLARY BLOOD GLUCOSE      POCT Blood Glucose.: 120 mg/dL (09 Aug 2018 11:34)  POCT Blood Glucose.: 130 mg/dL (09 Aug 2018 07:52)  POCT Blood Glucose.: 142 mg/dL (08 Aug 2018 22:04)  POCT Blood Glucose.: 130 mg/dL (08 Aug 2018 16:40)          RADIOLOGY & ADDITIONAL TESTS:    Personally reviewed.     Consultant(s) Notes Reviewed:  [x] YES  [ ] NO    Care Discussed with [x] Consultants  [x] Patient  [ ] Family  [ ]      [ ] Other; RN  DVT ppx

## 2018-08-09 NOTE — PROGRESS NOTE ADULT - SUBJECTIVE AND OBJECTIVE BOX
Interval History:  remains stable. no symptoms of bleeding  s/p bone marrow asp and biopsy yesterday  Chart reviewed and events noted;   Overnight events:    MEDICATIONS  (STANDING):  benzocaine 15 mG/menthol 3.6 mG Lozenge 1 Lozenge Oral five times a day  dextrose 5%. 1000 milliLiter(s) (50 mL/Hr) IV Continuous <Continuous>  dextrose 50% Injectable 12.5 Gram(s) IV Push once  dextrose 50% Injectable 25 Gram(s) IV Push once  dextrose 50% Injectable 25 Gram(s) IV Push once  docusate sodium 100 milliGRAM(s) Oral two times a day  insulin lispro (HumaLOG) corrective regimen sliding scale   SubCutaneous three times a day before meals  insulin lispro (HumaLOG) corrective regimen sliding scale   SubCutaneous at bedtime  levothyroxine 75 MICROGram(s) Oral daily  metoprolol succinate ER 25 milliGRAM(s) Oral daily  multivitamin 1 Tablet(s) Oral daily  pantoprazole    Tablet 40 milliGRAM(s) Oral before breakfast  simvastatin 40 milliGRAM(s) Oral at bedtime    MEDICATIONS  (PRN):  dextrose 40% Gel 15 Gram(s) Oral once PRN Blood Glucose LESS THAN 70 milliGRAM(s)/deciliter  diphenhydrAMINE   Capsule 25 milliGRAM(s) Oral at bedtime PRN Insomnia  glucagon  Injectable 1 milliGRAM(s) IntraMuscular once PRN Glucose LESS THAN 70 milligrams/deciliter      Vital Signs Last 24 Hrs  T(C): 36.9 (09 Aug 2018 08:50), Max: 37.1 (09 Aug 2018 04:38)  T(F): 98.4 (09 Aug 2018 08:50), Max: 98.7 (09 Aug 2018 04:38)  HR: 54 (09 Aug 2018 08:50) (50 - 59)  BP: 133/69 (09 Aug 2018 08:50) (125/76 - 147/72)  BP(mean): --  RR: 17 (09 Aug 2018 08:50) (16 - 17)  SpO2: 98% (09 Aug 2018 08:50) (95% - 98%)    PHYSICAL EXAM  General: adult in NAD  HEENT: clear oropharynx, anicteric sclera, pink conjunctivae  Neck: supple  CV: normal S1S2 with no murmur rubs or gallops  Lungs: clear to auscultation, no wheezes, no rhales  Abdomen: soft non-tender non-distended, no hepato/splenomegaly  Ext: no clubbing cyanosis or edema  Skin: no rashes and no petichiae  Neuro: alert and oriented X3 no focal deficits      LABS:  CBC Full  -  ( 09 Aug 2018 08:06 )  WBC Count : 4.91 K/uL  Hemoglobin : 14.3 g/dL  Hematocrit : 41.5 %  Platelet Count - Automated : <3.0 K/uL  Mean Cell Volume : 87.4 fl  Mean Cell Hemoglobin : 30.1 pg  Mean Cell Hemoglobin Concentration : 34.5 gm/dL  Auto Neutrophil # : x  Auto Lymphocyte # : x  Auto Monocyte # : x  Auto Eosinophil # : x  Auto Basophil # : x  Auto Neutrophil % : x  Auto Lymphocyte % : x  Auto Monocyte % : x  Auto Eosinophil % : x  Auto Basophil % : x    08-09    141  |  105  |  26<H>  ----------------------------<  95  4.0   |  27  |  1.00    Ca    8.4<L>      09 Aug 2018 08:06    TPro  8.2  /  Alb  2.8<L>  /  TBili  1.0  /  DBili  .20  /  AST  25  /  ALT  21  /  AlkPhos  53  08-08        fe studies      WBC trend  4.91 K/uL (08-09-18 @ 08:06)  7.32 K/uL (08-08-18 @ 07:21)  8.66 K/uL (08-07-18 @ 16:47)  9.49 K/uL (08-07-18 @ 06:48)      Hgb trend  14.3 g/dL (08-09-18 @ 08:06)  13.6 g/dL (08-08-18 @ 07:21)  13.0 g/dL (08-07-18 @ 16:47)  12.9 g/dL (08-07-18 @ 06:48)      plt trend  <3.0 K/uL (08-09-18 @ 08:06)  8 K/uL (08-08-18 @ 07:21)  17 K/uL (08-07-18 @ 16:47)  16 K/uL (08-07-18 @ 06:48)        RADIOLOGY & ADDITIONAL STUDIES:

## 2018-08-09 NOTE — PROGRESS NOTE ADULT - PROBLEM SELECTOR PLAN 7
- continue home simvastatin.  - hold aspirin. - continue home simvastatin.  - hold home aspirin until platelet count is > 50,000.

## 2018-08-09 NOTE — PROGRESS NOTE ADULT - ASSESSMENT
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

## 2018-08-09 NOTE — PROGRESS NOTE ADULT - SUBJECTIVE AND OBJECTIVE BOX
Patient is a 84y old  Male who presents with a chief complaint of Petechial Rash and Bleeding Oral Ulcer, and admitted for ITP    INTERVAL EVENTS: No acute events overnight. Platelet count dropped 8 --> 3.0. Denies new rash, oral bleeding, SOB, CP, n/v, or leg pain. Rash on legs persists.    REVIEW OF SYSTEMS:  CONSTITUTIONAL: No fever or chills  HEENT:  No headache, no sore throat  RESPIRATORY: No cough, wheezing, or shortness of breath  CARDIOVASCULAR: No chest pain, palpitations, or leg swelling  GASTROINTESTINAL: No abd pain, nausea, vomiting, or diarrhea  GENITOURINARY: No hematuria  SKIN: Rash on legs persists    Vital Signs Last 24 Hrs  T(C): 36.9 (09 Aug 2018 08:50), Max: 37.1 (09 Aug 2018 04:38)  T(F): 98.4 (09 Aug 2018 08:50), Max: 98.7 (09 Aug 2018 04:38)  HR: 54 (09 Aug 2018 08:50) (50 - 59)  BP: 133/69 (09 Aug 2018 08:50) (121/75 - 147/72)  BP(mean): --  RR: 17 (09 Aug 2018 08:50) (16 - 18)  SpO2: 98% (09 Aug 2018 08:50) (95% - 98%)    PHYSICAL EXAM:  GENERAL: NAD  HEENT:  EOMI, small non-bleeding ulcer on right lateral side of mouth  CHEST/LUNG:  CTA b/l, no rales, wheezes, or rhonchi  HEART:  RRR, S1, S2  ABDOMEN:  BS+, soft, nontender, nondistended  EXTREMITIES: no edema, cyanosis, or calf tenderness  NERVOUS SYSTEM: AA&Ox3  SKIN: + non blanching petechial rash on bilateral LE below the knee     MEDICATIONS  (STANDING):  benzocaine 15 mG/menthol 3.6 mG Lozenge 1 Lozenge Oral five times a day  dextrose 5%. 1000 milliLiter(s) (50 mL/Hr) IV Continuous <Continuous>  dextrose 50% Injectable 12.5 Gram(s) IV Push once  dextrose 50% Injectable 25 Gram(s) IV Push once  dextrose 50% Injectable 25 Gram(s) IV Push once  docusate sodium 100 milliGRAM(s) Oral two times a day  insulin lispro (HumaLOG) corrective regimen sliding scale   SubCutaneous three times a day before meals  insulin lispro (HumaLOG) corrective regimen sliding scale   SubCutaneous at bedtime  levothyroxine 75 MICROGram(s) Oral daily  metoprolol succinate ER 25 milliGRAM(s) Oral daily  multivitamin 1 Tablet(s) Oral daily  pantoprazole    Tablet 40 milliGRAM(s) Oral before breakfast  simvastatin 40 milliGRAM(s) Oral at bedtime    MEDICATIONS  (PRN):  dextrose 40% Gel 15 Gram(s) Oral once PRN Blood Glucose LESS THAN 70 milliGRAM(s)/deciliter  diphenhydrAMINE   Capsule 25 milliGRAM(s) Oral at bedtime PRN Insomnia  glucagon  Injectable 1 milliGRAM(s) IntraMuscular once PRN Glucose LESS THAN 70 milligrams/deciliter      Allergies    No Known Allergies    Intolerances        LABS:                        14.3   4.91  )-----------( <3.0     ( 09 Aug 2018 08:06 )             41.5     CBC Full  -  ( 09 Aug 2018 08:06 )  WBC Count : 4.91 K/uL  Hemoglobin : 14.3 g/dL  Hematocrit : 41.5 %  Platelet Count - Automated : <3.0 K/uL  Mean Cell Volume : 87.4 fl  Mean Cell Hemoglobin : 30.1 pg  Mean Cell Hemoglobin Concentration : 34.5 gm/dL  Auto Neutrophil # : x  Auto Lymphocyte # : x  Auto Monocyte # : x  Auto Eosinophil # : x  Auto Basophil # : x  Auto Neutrophil % : x  Auto Lymphocyte % : x  Auto Monocyte % : x  Auto Eosinophil % : x  Auto Basophil % : x    09 Aug 2018 08:06    141    |  105    |  26     ----------------------------<  95     4.0     |  27     |  1.00     Ca    8.4        09 Aug 2018 08:06    TPro  8.2    /  Alb  2.8    /  TBili  1.0    /  DBili  .20    /  AST  25     /  ALT  21     /  AlkPhos  53     08 Aug 2018 13:04        CAPILLARY BLOOD GLUCOSE      POCT Blood Glucose.: 120 mg/dL (09 Aug 2018 11:34)  POCT Blood Glucose.: 130 mg/dL (09 Aug 2018 07:52)  POCT Blood Glucose.: 142 mg/dL (08 Aug 2018 22:04)  POCT Blood Glucose.: 130 mg/dL (08 Aug 2018 16:40)          RADIOLOGY & ADDITIONAL TESTS:    Personally reviewed.     Consultant(s) Notes Reviewed:  [x] YES  [ ] NO    Care Discussed with [x] Consultants  [x] Patient  [ ] Family  [ ]      [ ] Other; RN  DVT ppx Patient is a 84y old  Male who presents with a chief complaint of Petechial Rash and Bleeding Oral Ulcer, and admitted for ITP    INTERVAL EVENTS: No acute events overnight. Platelet count dropped 8 --> 3. Denies new rash, oral bleeding, SOB, CP, n/v, or leg pain. Rash on legs persists.    REVIEW OF SYSTEMS:  CONSTITUTIONAL: No fever or chills  HEENT:  No headache, no sore throat  RESPIRATORY: No cough, wheezing, or shortness of breath  CARDIOVASCULAR: No chest pain, palpitations, or leg swelling  GASTROINTESTINAL: No abd pain, nausea, vomiting, or diarrhea  GENITOURINARY: No hematuria  SKIN: Rash on legs persists    Vital Signs Last 24 Hrs  T(C): 36.9 (09 Aug 2018 08:50), Max: 37.1 (09 Aug 2018 04:38)  T(F): 98.4 (09 Aug 2018 08:50), Max: 98.7 (09 Aug 2018 04:38)  HR: 54 (09 Aug 2018 08:50) (50 - 59)  BP: 133/69 (09 Aug 2018 08:50) (121/75 - 147/72)  BP(mean): --  RR: 17 (09 Aug 2018 08:50) (16 - 18)  SpO2: 98% (09 Aug 2018 08:50) (95% - 98%)    PHYSICAL EXAM:  GENERAL: NAD  HEENT:  EOMI, small non-bleeding ulcer on right lateral side of mouth  CHEST/LUNG:  CTA b/l, no rales, wheezes, or rhonchi  HEART:  RRR, S1, S2  ABDOMEN:  BS+, soft, nontender, nondistended  EXTREMITIES: no edema, cyanosis, or calf tenderness  NERVOUS SYSTEM: AA&Ox3  SKIN: + non blanching petechial rash on bilateral LE below the knee     MEDICATIONS  (STANDING):  benzocaine 15 mG/menthol 3.6 mG Lozenge 1 Lozenge Oral five times a day  dextrose 5%. 1000 milliLiter(s) (50 mL/Hr) IV Continuous <Continuous>  dextrose 50% Injectable 12.5 Gram(s) IV Push once  dextrose 50% Injectable 25 Gram(s) IV Push once  dextrose 50% Injectable 25 Gram(s) IV Push once  docusate sodium 100 milliGRAM(s) Oral two times a day  insulin lispro (HumaLOG) corrective regimen sliding scale   SubCutaneous three times a day before meals  insulin lispro (HumaLOG) corrective regimen sliding scale   SubCutaneous at bedtime  levothyroxine 75 MICROGram(s) Oral daily  metoprolol succinate ER 25 milliGRAM(s) Oral daily  multivitamin 1 Tablet(s) Oral daily  pantoprazole    Tablet 40 milliGRAM(s) Oral before breakfast  simvastatin 40 milliGRAM(s) Oral at bedtime    MEDICATIONS  (PRN):  dextrose 40% Gel 15 Gram(s) Oral once PRN Blood Glucose LESS THAN 70 milliGRAM(s)/deciliter  diphenhydrAMINE   Capsule 25 milliGRAM(s) Oral at bedtime PRN Insomnia  glucagon  Injectable 1 milliGRAM(s) IntraMuscular once PRN Glucose LESS THAN 70 milligrams/deciliter      Allergies    No Known Allergies    Intolerances        LABS:                        14.3   4.91  )-----------( <3.0     ( 09 Aug 2018 08:06 )             41.5     CBC Full  -  ( 09 Aug 2018 08:06 )  WBC Count : 4.91 K/uL  Hemoglobin : 14.3 g/dL  Hematocrit : 41.5 %  Platelet Count - Automated : <3.0 K/uL  Mean Cell Volume : 87.4 fl  Mean Cell Hemoglobin : 30.1 pg  Mean Cell Hemoglobin Concentration : 34.5 gm/dL  Auto Neutrophil # : x  Auto Lymphocyte # : x  Auto Monocyte # : x  Auto Eosinophil # : x  Auto Basophil # : x  Auto Neutrophil % : x  Auto Lymphocyte % : x  Auto Monocyte % : x  Auto Eosinophil % : x  Auto Basophil % : x    09 Aug 2018 08:06    141    |  105    |  26     ----------------------------<  95     4.0     |  27     |  1.00     Ca    8.4        09 Aug 2018 08:06    TPro  8.2    /  Alb  2.8    /  TBili  1.0    /  DBili  .20    /  AST  25     /  ALT  21     /  AlkPhos  53     08 Aug 2018 13:04        CAPILLARY BLOOD GLUCOSE      POCT Blood Glucose.: 120 mg/dL (09 Aug 2018 11:34)  POCT Blood Glucose.: 130 mg/dL (09 Aug 2018 07:52)  POCT Blood Glucose.: 142 mg/dL (08 Aug 2018 22:04)  POCT Blood Glucose.: 130 mg/dL (08 Aug 2018 16:40)          RADIOLOGY & ADDITIONAL TESTS:    Personally reviewed.     Consultant(s) Notes Reviewed:  [x] YES  [ ] NO    Care Discussed with [x] Consultants  [x] Patient  [ ] Family  [ ]      [ ] Other; RN  DVT ppx Patient is a 84y old  Male who presents with a chief complaint of Petechial Rash and Bleeding Oral Ulcer, and admitted for ITP    INTERVAL EVENTS: No acute events overnight. Platelet count dropped 8 to < 3. Denies new rash, oral bleeding, SOB, CP, n/v, or leg pain. Rash on legs persists.    REVIEW OF SYSTEMS:  CONSTITUTIONAL: No fever or chills  HEENT:  No headache, no sore throat  RESPIRATORY: No cough, wheezing, or shortness of breath  CARDIOVASCULAR: No chest pain, palpitations, or leg swelling  GASTROINTESTINAL: No abd pain, nausea, vomiting, diarrhea, melena or hematochezia  GENITOURINARY: No hematuria  SKIN: Rash on legs persists    Vital Signs Last 24 Hrs  T(C): 36.9 (09 Aug 2018 08:50), Max: 37.1 (09 Aug 2018 04:38)  T(F): 98.4 (09 Aug 2018 08:50), Max: 98.7 (09 Aug 2018 04:38)  HR: 54 (09 Aug 2018 08:50) (50 - 59)  BP: 133/69 (09 Aug 2018 08:50) (121/75 - 147/72)  BP(mean): --  RR: 17 (09 Aug 2018 08:50) (16 - 18)  SpO2: 98% (09 Aug 2018 08:50) (95% - 98%)    PHYSICAL EXAM:  GENERAL: NAD  HEENT:  EOMI, small non-bleeding ulcer on right lateral side of mouth  CHEST/LUNG:  CTA b/l, no rales, wheezes, or rhonchi  HEART:  RRR, S1, S2  ABDOMEN:  BS+, soft, nontender, nondistended  EXTREMITIES: no edema, cyanosis, or calf tenderness  NERVOUS SYSTEM: AA&Ox3  SKIN: + non blanching petechial rash on bilateral LE below the knee     MEDICATIONS  (STANDING):  benzocaine 15 mG/menthol 3.6 mG Lozenge 1 Lozenge Oral five times a day  dextrose 5%. 1000 milliLiter(s) (50 mL/Hr) IV Continuous <Continuous>  dextrose 50% Injectable 12.5 Gram(s) IV Push once  dextrose 50% Injectable 25 Gram(s) IV Push once  dextrose 50% Injectable 25 Gram(s) IV Push once  docusate sodium 100 milliGRAM(s) Oral two times a day  insulin lispro (HumaLOG) corrective regimen sliding scale   SubCutaneous three times a day before meals  insulin lispro (HumaLOG) corrective regimen sliding scale   SubCutaneous at bedtime  levothyroxine 75 MICROGram(s) Oral daily  metoprolol succinate ER 25 milliGRAM(s) Oral daily  multivitamin 1 Tablet(s) Oral daily  pantoprazole    Tablet 40 milliGRAM(s) Oral before breakfast  simvastatin 40 milliGRAM(s) Oral at bedtime    MEDICATIONS  (PRN):  dextrose 40% Gel 15 Gram(s) Oral once PRN Blood Glucose LESS THAN 70 milliGRAM(s)/deciliter  diphenhydrAMINE   Capsule 25 milliGRAM(s) Oral at bedtime PRN Insomnia  glucagon  Injectable 1 milliGRAM(s) IntraMuscular once PRN Glucose LESS THAN 70 milligrams/deciliter      Allergies    No Known Allergies    Intolerances        LABS:                        14.3   4.91  )-----------( <3.0     ( 09 Aug 2018 08:06 )             41.5     CBC Full  -  ( 09 Aug 2018 08:06 )  WBC Count : 4.91 K/uL  Hemoglobin : 14.3 g/dL  Hematocrit : 41.5 %  Platelet Count - Automated : <3.0 K/uL  Mean Cell Volume : 87.4 fl  Mean Cell Hemoglobin : 30.1 pg  Mean Cell Hemoglobin Concentration : 34.5 gm/dL  Auto Neutrophil # : x  Auto Lymphocyte # : x  Auto Monocyte # : x  Auto Eosinophil # : x  Auto Basophil # : x  Auto Neutrophil % : x  Auto Lymphocyte % : x  Auto Monocyte % : x  Auto Eosinophil % : x  Auto Basophil % : x    09 Aug 2018 08:06    141    |  105    |  26     ----------------------------<  95     4.0     |  27     |  1.00     Ca    8.4        09 Aug 2018 08:06    TPro  8.2    /  Alb  2.8    /  TBili  1.0    /  DBili  .20    /  AST  25     /  ALT  21     /  AlkPhos  53     08 Aug 2018 13:04        CAPILLARY BLOOD GLUCOSE      POCT Blood Glucose.: 120 mg/dL (09 Aug 2018 11:34)  POCT Blood Glucose.: 130 mg/dL (09 Aug 2018 07:52)  POCT Blood Glucose.: 142 mg/dL (08 Aug 2018 22:04)  POCT Blood Glucose.: 130 mg/dL (08 Aug 2018 16:40)          RADIOLOGY & ADDITIONAL TESTS:    Personally reviewed.     Consultant(s) Notes Reviewed:  [x] YES  [ ] NO

## 2018-08-09 NOTE — PROGRESS NOTE ADULT - PROBLEM SELECTOR PLAN 1
- Likely due to ITP, possibly secondary to augmentin use 2 weeks ago.  - Completed 2 day course of IVIG, 4 day course of pulse dose steroids, and platelet transfusion yesterday, but now platelet count below 3K, so perhaps refractory ITP  - f/u bone marrow biopsy results.  - f/u hepatic panel for possible prescription of rituxan.   - heme/onc recs appreciated - Likely due to ITP, possibly secondary to augmentin use 2 weeks ago.  - Completed 2 day course of IVIG, 4 day course of pulse dose steroids, and platelet transfusion yesterday, but now platelet count below 3K, so perhaps refractory ITP  - f/u bone marrow biopsy results.  - f/u hepatitis panel before possible initiation of rituxan tomorrow  - heme/onc recs appreciated

## 2018-08-09 NOTE — PROGRESS NOTE ADULT - PROBLEM SELECTOR PLAN 1
- Likely due to ITP, possibly secondary to augmentin use 2 weeks ago.  - completed 2 day course of IVIG; and 4 day course of pulse dose steroids, but now platelet count below 3K, so perhaps refractory ITP  - f/u biopsy results - Likely due to ITP, possibly secondary to augmentin use 2 weeks ago.  - completed 2 day course of IVIG, 4 day course of pulse dose steroids, and platelet transfusion yesterday, but now platelet count below 3K, so perhaps refractory ITP  - f/u bone marrow biopsy results

## 2018-08-09 NOTE — PROGRESS NOTE ADULT - ASSESSMENT
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

## 2018-08-10 LAB
ANION GAP SERPL CALC-SCNC: 10 MMOL/L — SIGNIFICANT CHANGE UP (ref 5–17)
BUN SERPL-MCNC: 30 MG/DL — HIGH (ref 7–23)
CALCIUM SERPL-MCNC: 8.4 MG/DL — LOW (ref 8.5–10.1)
CHLORIDE SERPL-SCNC: 105 MMOL/L — SIGNIFICANT CHANGE UP (ref 96–108)
CO2 SERPL-SCNC: 23 MMOL/L — SIGNIFICANT CHANGE UP (ref 22–31)
CREAT SERPL-MCNC: 1.1 MG/DL — SIGNIFICANT CHANGE UP (ref 0.5–1.3)
GLUCOSE SERPL-MCNC: 146 MG/DL — HIGH (ref 70–99)
HAV IGM SER-ACNC: SIGNIFICANT CHANGE UP
HBV CORE IGM SER-ACNC: SIGNIFICANT CHANGE UP
HBV SURFACE AG SER-ACNC: SIGNIFICANT CHANGE UP
HCT VFR BLD CALC: 36.8 % — LOW (ref 39–50)
HCT VFR BLD CALC: 40.2 % — SIGNIFICANT CHANGE UP (ref 39–50)
HCV AB S/CO SERPL IA: 0.28 S/CO — SIGNIFICANT CHANGE UP
HCV AB SERPL-IMP: SIGNIFICANT CHANGE UP
HGB BLD-MCNC: 12.9 G/DL — LOW (ref 13–17)
HGB BLD-MCNC: 14.5 G/DL — SIGNIFICANT CHANGE UP (ref 13–17)
MCHC RBC-ENTMCNC: 30.2 PG — SIGNIFICANT CHANGE UP (ref 27–34)
MCHC RBC-ENTMCNC: 31 PG — SIGNIFICANT CHANGE UP (ref 27–34)
MCHC RBC-ENTMCNC: 35.1 GM/DL — SIGNIFICANT CHANGE UP (ref 32–36)
MCHC RBC-ENTMCNC: 36.1 GM/DL — HIGH (ref 32–36)
MCV RBC AUTO: 85.9 FL — SIGNIFICANT CHANGE UP (ref 80–100)
MCV RBC AUTO: 86.2 FL — SIGNIFICANT CHANGE UP (ref 80–100)
NRBC # BLD: 0 /100 WBCS — SIGNIFICANT CHANGE UP (ref 0–0)
NRBC # BLD: 0 /100 WBCS — SIGNIFICANT CHANGE UP (ref 0–0)
PLATELET # BLD AUTO: 1 K/UL — CRITICAL LOW (ref 150–400)
PLATELET # BLD AUTO: <3 K/UL — CRITICAL LOW (ref 150–400)
POTASSIUM SERPL-MCNC: 4 MMOL/L — SIGNIFICANT CHANGE UP (ref 3.5–5.3)
POTASSIUM SERPL-SCNC: 4 MMOL/L — SIGNIFICANT CHANGE UP (ref 3.5–5.3)
RBC # BLD: 4.27 M/UL — SIGNIFICANT CHANGE UP (ref 4.2–5.8)
RBC # BLD: 4.68 M/UL — SIGNIFICANT CHANGE UP (ref 4.2–5.8)
RBC # BLD: 4.68 M/UL — SIGNIFICANT CHANGE UP (ref 4.2–5.8)
RBC # FLD: 13.1 % — SIGNIFICANT CHANGE UP (ref 10.3–14.5)
RBC # FLD: 13.2 % — SIGNIFICANT CHANGE UP (ref 10.3–14.5)
RETICS #: 86.1 K/UL — SIGNIFICANT CHANGE UP (ref 25–125)
RETICS/RBC NFR: 1.8 % — SIGNIFICANT CHANGE UP (ref 0.5–2.5)
SODIUM SERPL-SCNC: 138 MMOL/L — SIGNIFICANT CHANGE UP (ref 135–145)
WBC # BLD: 6.45 K/UL — SIGNIFICANT CHANGE UP (ref 3.8–10.5)
WBC # BLD: 7.57 K/UL — SIGNIFICANT CHANGE UP (ref 3.8–10.5)
WBC # FLD AUTO: 6.45 K/UL — SIGNIFICANT CHANGE UP (ref 3.8–10.5)
WBC # FLD AUTO: 7.57 K/UL — SIGNIFICANT CHANGE UP (ref 3.8–10.5)

## 2018-08-10 PROCEDURE — 99233 SBSQ HOSP IP/OBS HIGH 50: CPT | Mod: GC

## 2018-08-10 RX ORDER — PREGABALIN 225 MG/1
1000 CAPSULE ORAL DAILY
Qty: 0 | Refills: 0 | Status: DISCONTINUED | OUTPATIENT
Start: 2018-08-10 | End: 2018-08-12

## 2018-08-10 RX ORDER — FOLIC ACID 0.8 MG
1 TABLET ORAL DAILY
Qty: 0 | Refills: 0 | Status: DISCONTINUED | OUTPATIENT
Start: 2018-08-10 | End: 2018-08-12

## 2018-08-10 RX ADMIN — Medication 1: at 17:11

## 2018-08-10 RX ADMIN — Medication 60 MILLIGRAM(S): at 05:44

## 2018-08-10 RX ADMIN — Medication 1: at 11:39

## 2018-08-10 RX ADMIN — Medication 1 TABLET(S): at 11:39

## 2018-08-10 RX ADMIN — PANTOPRAZOLE SODIUM 40 MILLIGRAM(S): 20 TABLET, DELAYED RELEASE ORAL at 05:44

## 2018-08-10 RX ADMIN — Medication 1: at 08:03

## 2018-08-10 RX ADMIN — SIMVASTATIN 40 MILLIGRAM(S): 20 TABLET, FILM COATED ORAL at 22:49

## 2018-08-10 RX ADMIN — Medication 100 MILLIGRAM(S): at 17:11

## 2018-08-10 RX ADMIN — Medication 1 MILLIGRAM(S): at 13:15

## 2018-08-10 RX ADMIN — Medication 25 MILLIGRAM(S): at 05:44

## 2018-08-10 RX ADMIN — PREGABALIN 1000 MICROGRAM(S): 225 CAPSULE ORAL at 13:15

## 2018-08-10 RX ADMIN — Medication 75 MICROGRAM(S): at 05:44

## 2018-08-10 RX ADMIN — Medication 100 MILLIGRAM(S): at 05:44

## 2018-08-10 RX ADMIN — Medication 25 MILLIGRAM(S): at 22:49

## 2018-08-10 NOTE — DIETITIAN INITIAL EVALUATION ADULT. - PROBLEM SELECTOR PLAN 2
- acute  -  Patient presents with 2x1cm hamartoma on right lateral side of the inside of the mouth.  - Not currently bleeding.  - H/H currently stable, continue to montior.  - Place on continuous pulse oximetry.  - Aspiration precautions - ensure bed is elevated above 30 degrees.

## 2018-08-10 NOTE — PROGRESS NOTE ADULT - PROBLEM SELECTOR PROBLEM 7
CAD (coronary artery disease)
HLD (hyperlipidemia)
HLD (hyperlipidemia)

## 2018-08-10 NOTE — PROGRESS NOTE ADULT - PROBLEM SELECTOR PROBLEM 6
HLD (hyperlipidemia)
Hypothyroid
Hypothyroid

## 2018-08-10 NOTE — PROGRESS NOTE ADULT - SUBJECTIVE AND OBJECTIVE BOX
[INTERVAL HX: ]  Patient seen and examined;  Chart reviewed and events noted;     c/o gum bleeding this AM. Described as ulcer but on exam with bleeding. More LE petechia.   Denies HA. Had long discussion with pt with Dr Tabor in room also. Discussed so far dx consistent with ITP. Discussed that prelim results likely to return on Mon. Discussed options such as Nplate to bring up plts, but with need for weekly rx. Discussed Rituxan which has better chance for more durable response. Alternative to not undergo tx but felt to be poor option. Pt states does not want to make a decision. Asks for recommendation, which I explained that Rituxan would be best option for pt, so long as pt agrees.        MEDICATIONS  (STANDING):  benzocaine 15 mG/menthol 3.6 mG Lozenge 1 Lozenge Oral five times a day  cyanocobalamin Injectable 1000 MICROGram(s) SubCutaneous daily  dextrose 5%. 1000 milliLiter(s) (50 mL/Hr) IV Continuous <Continuous>  dextrose 50% Injectable 12.5 Gram(s) IV Push once  dextrose 50% Injectable 25 Gram(s) IV Push once  dextrose 50% Injectable 25 Gram(s) IV Push once  docusate sodium 100 milliGRAM(s) Oral two times a day  folic acid 1 milliGRAM(s) Oral daily  insulin lispro (HumaLOG) corrective regimen sliding scale   SubCutaneous three times a day before meals  insulin lispro (HumaLOG) corrective regimen sliding scale   SubCutaneous at bedtime  levothyroxine 75 MICROGram(s) Oral daily  metoprolol succinate ER 25 milliGRAM(s) Oral daily  multivitamin 1 Tablet(s) Oral daily  pantoprazole    Tablet 40 milliGRAM(s) Oral before breakfast  predniSONE   Tablet 60 milliGRAM(s) Oral daily  simvastatin 40 milliGRAM(s) Oral at bedtime    MEDICATIONS  (PRN):  dextrose 40% Gel 15 Gram(s) Oral once PRN Blood Glucose LESS THAN 70 milliGRAM(s)/deciliter  diphenhydrAMINE   Capsule 25 milliGRAM(s) Oral at bedtime PRN Insomnia  glucagon  Injectable 1 milliGRAM(s) IntraMuscular once PRN Glucose LESS THAN 70 milligrams/deciliter      Vital Signs Last 24 Hrs  T(C): 36.4 (10 Aug 2018 16:35), Max: 37.1 (10 Aug 2018 00:06)  T(F): 97.5 (10 Aug 2018 16:35), Max: 98.8 (10 Aug 2018 00:06)  HR: 62 (10 Aug 2018 16:35) (55 - 67)  BP: 122/79 (10 Aug 2018 16:35) (120/74 - 140/79)  BP(mean): --  RR: 18 (10 Aug 2018 16:35) (17 - 18)  SpO2: 97% (10 Aug 2018 16:35) (95% - 99%)      [PHYSICAL EXAM]  General: adult in NAD,  WN,  WD.  HEENT: clear oropharynx, anicteric sclera, pink conjunctivae. small R upper canine gum bleeding.   Neck: supple, no masses.  CV: normal S1S2, no murmur, no rubs, no gallops.  Lungs: clear to auscultation, no wheezes, no rales, no rhonchi.  Abdomen: soft, non-tender, non-distended, no hepatosplenomegaly, normal BS, no guarding.  Ext: no clubbing, no cyanosis, no edema.  Skin: no rashes,  + LE petechiae, no venous stasis changes.  Neuro: alert and oriented X3, no focal motor deficits.  LN: no TAYO.      [LABS:]                        14.5   6.45  )-----------( <3.0     ( 10 Aug 2018 07:08 )             40.2     08-10    138  |  105  |  30<H>  ----------------------------<  146<H>  4.0   |  23  |  1.10    Ca    8.4<L>      10 Aug 2018 07:08            [RADIOLOGY STUDIES:]

## 2018-08-10 NOTE — PROGRESS NOTE ADULT - PROBLEM SELECTOR PLAN 8
- continue home synthroid.
- Hold home aspirin given platelet count  - DVT ppx: SCDs given thrombocytopenia
- continue home synthroid.
- Hold home aspirin until platelet count is > 50,000.  - DVT ppx: None required as patient is ambulating. SCDs d/tarsha as will worsen petechial rash.
- continue home synthroid.

## 2018-08-10 NOTE — PROGRESS NOTE ADULT - PROBLEM SELECTOR PROBLEM 4
DM (diabetes mellitus)

## 2018-08-10 NOTE — PROGRESS NOTE ADULT - PROBLEM SELECTOR PLAN 1
- Likely due to ITP, possibly secondary to augmentin use 2 weeks ago.  - Completed 2 day course of IVIG, 4 day course of pulse dose steroids, and platelet transfusion, but now platelet count below 3K, so perhaps refractory ITP  - Hepatitis panel nonreactive   - f/u bone marrow biopsy results before possible initiation of rituxan  - heme/onc recs appreciated.

## 2018-08-10 NOTE — DIETITIAN INITIAL EVALUATION ADULT. - PROBLEM SELECTOR PLAN 3
- chronic, stable  - Hypertensive at 179/94 likely secondary to anxiety from current state - will follow up repeat vitals  - Continue home metoprolol.

## 2018-08-10 NOTE — PROGRESS NOTE ADULT - PROBLEM SELECTOR PROBLEM 8
Hypothyroid
Need for prophylactic measure
Need for prophylactic measure

## 2018-08-10 NOTE — PROGRESS NOTE ADULT - PROBLEM SELECTOR PLAN 1
- Likely due to ITP, possibly secondary to augmentin use 2 weeks ago.  - Completed 2 day course of IVIG, 4 day course of pulse dose steroids, and platelet transfusion, but now platelet count below 3K, so perhaps refractory ITP  - Hepatitis panel nonreactive   - f/u bone marrow biopsy results before possible initiation of rituxan  - Continue prednisone 60 mg daily.  - heme/onc recs appreciated. - Likely due to ITP, possibly secondary to augmentin use 2 weeks ago.  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  - heme/onc recs appreciated.

## 2018-08-10 NOTE — PROVIDER CONTACT NOTE (CRITICAL VALUE NOTIFICATION) - ACTION/TREATMENT ORDERED:
Continue steriods and monitor.
Dr Hutton to order one unit of platelets to be transfused. Additionally, lab to perform confirmation test.
MD notified and is aware.
no further interventions at this time
no order made. continue monitoring
no order at this time. keep monitoring

## 2018-08-10 NOTE — DIETITIAN INITIAL EVALUATION ADULT. - PROBLEM SELECTOR PLAN 4
- Type 2 diabetic - HbA1c 6.5 (3/2018)  - Will repeat HbA1c  - Insulin coverage scale with hypoglycemic protocol  - Consistent carbohydrate diet.

## 2018-08-10 NOTE — PROGRESS NOTE ADULT - PROBLEM SELECTOR PLAN 6
- continue home simvastatin.
- continue home synthroid
- continue home synthroid
- continue home simvastatin.

## 2018-08-10 NOTE — PROGRESS NOTE ADULT - SUBJECTIVE AND OBJECTIVE BOX
Patient is a 84y old  Male who presents with a chief complaint of Petechial Rash and Bleeding Oral Ulcer, and admitted for ITP.    INTERVAL EVENTS: Patient reports new lesion in mouth just under his lip. Old lesion improving. Platelet count continuously at 3. Denies oral bleeding, SOB, CP, n/v, or leg pain. Rash on leg persists.    REVIEW OF SYSTEMS:  CONSTITUTIONAL: No fever or chills  HEENT:  No headache, no sore throat. +oral lesions  RESPIRATORY: No cough, wheezing, or shortness of breath  CARDIOVASCULAR: No chest pain, palpitations, or leg swelling  GASTROINTESTINAL: No abd pain, nausea, vomiting, or diarrhea  GENITOURINARY: No dysuria, frequency, or hematuria  SKIN: Rash on leg persists    Vital Signs Last 24 Hrs  T(C): 36.8 (10 Aug 2018 08:16), Max: 37.1 (10 Aug 2018 00:06)  T(F): 98.3 (10 Aug 2018 08:16), Max: 98.8 (10 Aug 2018 00:06)  HR: 57 (10 Aug 2018 08:16) (57 - 67)  BP: 131/80 (10 Aug 2018 08:16) (130/83 - 146/82)  BP(mean): --  RR: 17 (10 Aug 2018 08:16) (17 - 18)  SpO2: 98% (10 Aug 2018 08:16) (95% - 98%)    PHYSICAL EXAM:  GENERAL: NAD  HEENT:  EOMI, small non-bleeding ulcer on right lateral side of mouth, small non-bleeding ulcer R inner lip  CHEST/LUNG:  CTA b/l, no rales, wheezes, or rhonchi  HEART:  RRR, S1, S2  ABDOMEN:  BS+, soft, nontender, nondistended  EXTREMITIES: no edema, cyanosis, or calf tenderness  NERVOUS SYSTEM: AA&Ox3  SKIN: non blanching petechial rash on bilateral LE below the knee     MEDICATIONS  (STANDING):  benzocaine 15 mG/menthol 3.6 mG Lozenge 1 Lozenge Oral five times a day  dextrose 5%. 1000 milliLiter(s) (50 mL/Hr) IV Continuous <Continuous>  dextrose 50% Injectable 12.5 Gram(s) IV Push once  dextrose 50% Injectable 25 Gram(s) IV Push once  dextrose 50% Injectable 25 Gram(s) IV Push once  docusate sodium 100 milliGRAM(s) Oral two times a day  insulin lispro (HumaLOG) corrective regimen sliding scale   SubCutaneous three times a day before meals  insulin lispro (HumaLOG) corrective regimen sliding scale   SubCutaneous at bedtime  levothyroxine 75 MICROGram(s) Oral daily  metoprolol succinate ER 25 milliGRAM(s) Oral daily  multivitamin 1 Tablet(s) Oral daily  pantoprazole    Tablet 40 milliGRAM(s) Oral before breakfast  predniSONE   Tablet 60 milliGRAM(s) Oral daily  simvastatin 40 milliGRAM(s) Oral at bedtime    MEDICATIONS  (PRN):  dextrose 40% Gel 15 Gram(s) Oral once PRN Blood Glucose LESS THAN 70 milliGRAM(s)/deciliter  diphenhydrAMINE   Capsule 25 milliGRAM(s) Oral at bedtime PRN Insomnia  glucagon  Injectable 1 milliGRAM(s) IntraMuscular once PRN Glucose LESS THAN 70 milligrams/deciliter      Allergies    No Known Allergies    Intolerances          LABS:                        14.5   6.45  )-----------( <3.0     ( 10 Aug 2018 07:08 )             40.2     CBC Full  -  ( 10 Aug 2018 07:08 )  WBC Count : 6.45 K/uL  Hemoglobin : 14.5 g/dL  Hematocrit : 40.2 %  Platelet Count - Automated : <3.0 K/uL  Mean Cell Volume : 85.9 fl  Mean Cell Hemoglobin : 31.0 pg  Mean Cell Hemoglobin Concentration : 36.1 gm/dL  Auto Neutrophil # : x  Auto Lymphocyte # : x  Auto Monocyte # : x  Auto Eosinophil # : x  Auto Basophil # : x  Auto Neutrophil % : x  Auto Lymphocyte % : x  Auto Monocyte % : x  Auto Eosinophil % : x  Auto Basophil % : x    10 Aug 2018 07:08    138    |  105    |  30     ----------------------------<  146    4.0     |  23     |  1.10     Ca    8.4        10 Aug 2018 07:08          CAPILLARY BLOOD GLUCOSE      POCT Blood Glucose.: 151 mg/dL (10 Aug 2018 07:56)  POCT Blood Glucose.: 190 mg/dL (09 Aug 2018 21:59)  POCT Blood Glucose.: 130 mg/dL (09 Aug 2018 16:31)  POCT Blood Glucose.: 120 mg/dL (09 Aug 2018 11:34)          RADIOLOGY & ADDITIONAL TESTS:    Personally reviewed.     Consultant(s) Notes Reviewed:  [x] YES  [ ] NO    Care Discussed with [x] Consultants  [x] Patient  [ ] Family  [ ]      [ ] Other; RN  DVT ppx Patient is a 84y old  Male who presents with a chief complaint of Petechial Rash and Bleeding Oral Ulcer, and admitted for ITP.    INTERVAL EVENTS: Patient reports new lesion in mouth just under his lip. Old lesion improving. Platelet count continuously at 3. Denies oral bleeding, SOB, CP, n/v, or leg pain. Rash on leg persists.    REVIEW OF SYSTEMS:  CONSTITUTIONAL: No fever or chills  HEENT:  No headache, no sore throat. +oral lesions  RESPIRATORY: No cough, wheezing, or shortness of breath  CARDIOVASCULAR: No chest pain, palpitations, or leg swelling  GASTROINTESTINAL: No abd pain, nausea, vomiting, or diarrhea  GENITOURINARY: No dysuria, frequency, or hematuria  SKIN: Rash on leg persists    Vital Signs Last 24 Hrs  T(C): 36.8 (10 Aug 2018 08:16), Max: 37.1 (10 Aug 2018 00:06)  T(F): 98.3 (10 Aug 2018 08:16), Max: 98.8 (10 Aug 2018 00:06)  HR: 57 (10 Aug 2018 08:16) (57 - 67)  BP: 131/80 (10 Aug 2018 08:16) (130/83 - 146/82)  BP(mean): --  RR: 17 (10 Aug 2018 08:16) (17 - 18)  SpO2: 98% (10 Aug 2018 08:16) (95% - 98%)    PHYSICAL EXAM:  GENERAL: NAD  HEENT:  EOMI, small non-bleeding ulcer on right lateral side of mouth, small red non-bleeding papule R inner lip  CHEST/LUNG:  CTA b/l, no rales, wheezes, or rhonchi  HEART:  RRR, S1, S2  ABDOMEN:  BS+, soft, nontender, nondistended  EXTREMITIES: no edema, cyanosis, or calf tenderness  NERVOUS SYSTEM: AA&Ox3  SKIN: non blanching petechial rash on bilateral LE below the knee     MEDICATIONS  (STANDING):  benzocaine 15 mG/menthol 3.6 mG Lozenge 1 Lozenge Oral five times a day  dextrose 5%. 1000 milliLiter(s) (50 mL/Hr) IV Continuous <Continuous>  dextrose 50% Injectable 12.5 Gram(s) IV Push once  dextrose 50% Injectable 25 Gram(s) IV Push once  dextrose 50% Injectable 25 Gram(s) IV Push once  docusate sodium 100 milliGRAM(s) Oral two times a day  insulin lispro (HumaLOG) corrective regimen sliding scale   SubCutaneous three times a day before meals  insulin lispro (HumaLOG) corrective regimen sliding scale   SubCutaneous at bedtime  levothyroxine 75 MICROGram(s) Oral daily  metoprolol succinate ER 25 milliGRAM(s) Oral daily  multivitamin 1 Tablet(s) Oral daily  pantoprazole    Tablet 40 milliGRAM(s) Oral before breakfast  predniSONE   Tablet 60 milliGRAM(s) Oral daily  simvastatin 40 milliGRAM(s) Oral at bedtime    MEDICATIONS  (PRN):  dextrose 40% Gel 15 Gram(s) Oral once PRN Blood Glucose LESS THAN 70 milliGRAM(s)/deciliter  diphenhydrAMINE   Capsule 25 milliGRAM(s) Oral at bedtime PRN Insomnia  glucagon  Injectable 1 milliGRAM(s) IntraMuscular once PRN Glucose LESS THAN 70 milligrams/deciliter      Allergies    No Known Allergies    Intolerances          LABS:                        14.5   6.45  )-----------( <3.0     ( 10 Aug 2018 07:08 )             40.2     CBC Full  -  ( 10 Aug 2018 07:08 )  WBC Count : 6.45 K/uL  Hemoglobin : 14.5 g/dL  Hematocrit : 40.2 %  Platelet Count - Automated : <3.0 K/uL  Mean Cell Volume : 85.9 fl  Mean Cell Hemoglobin : 31.0 pg  Mean Cell Hemoglobin Concentration : 36.1 gm/dL  Auto Neutrophil # : x  Auto Lymphocyte # : x  Auto Monocyte # : x  Auto Eosinophil # : x  Auto Basophil # : x  Auto Neutrophil % : x  Auto Lymphocyte % : x  Auto Monocyte % : x  Auto Eosinophil % : x  Auto Basophil % : x    10 Aug 2018 07:08    138    |  105    |  30     ----------------------------<  146    4.0     |  23     |  1.10     Ca    8.4        10 Aug 2018 07:08          CAPILLARY BLOOD GLUCOSE      POCT Blood Glucose.: 151 mg/dL (10 Aug 2018 07:56)  POCT Blood Glucose.: 190 mg/dL (09 Aug 2018 21:59)  POCT Blood Glucose.: 130 mg/dL (09 Aug 2018 16:31)  POCT Blood Glucose.: 120 mg/dL (09 Aug 2018 11:34)          RADIOLOGY & ADDITIONAL TESTS:    Personally reviewed.     Consultant(s) Notes Reviewed:  [x] YES  [ ] NO    Care Discussed with [x] Consultants  [x] Patient  [ ] Family  [ ]      [ ] Other; RN  DVT ppx

## 2018-08-10 NOTE — PROGRESS NOTE ADULT - PROBLEM SELECTOR PLAN 4
- Type 2 diabetic - HbA1c 6.5%  - Insulin coverage scale with hypoglycemic protocol  - Consistent carbohydrate diet. - Type 2 diabetic   - HbA1c 6.5%  - consistently elevated blood glucose level 151 --> 199 today  - Insulin coverage scale with hypoglycemic protocol  - Consistent carbohydrate diet.

## 2018-08-10 NOTE — DIETITIAN INITIAL EVALUATION ADULT. - OTHER INFO
Pt/wife reports UBW ~160-162#.  Pt had bone marrow aspiration and bx 8/8.  May start Rituxin. Elevated BS from steroids.  Pt/wife state pts BS never high normally.

## 2018-08-10 NOTE — DIETITIAN INITIAL EVALUATION ADULT. - PROBLEM SELECTOR PLAN 1
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 Reid)  - Start 40 mg IV dexamethasone daily for 4 days, day 1 of 4  - d/c PPI once steroids stopped.  - Start IVIG 75 grams/day for 2 days, premedicate with tylenol 650 and benadryl 50 30 minutes prior to each dose.  - Follow up additional heme/onc recommendations

## 2018-08-10 NOTE — PROGRESS NOTE ADULT - PROBLEM SELECTOR PROBLEM 5
HTN (hypertension)
SSS (sick sinus syndrome)
SSS (sick sinus syndrome)
HTN (hypertension)

## 2018-08-10 NOTE — PROGRESS NOTE ADULT - ASSESSMENT
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 But pt refused to make decision to get tx, essentially.   States he wants to think and consider, whether to undergo the Rituxan.   Advised pt to discuss with his dtr, if he requires it.     Possible concurrent B12 def    RECOMMEND:   Rituxan, if pt agrees.   Plts transfusion today. Check CBC post transfusion.   Continue empiric B12 daily.   Continue Folic acid empirically.   if HA, transfuse plts and obtain CT scan head, if any change in mental status.

## 2018-08-10 NOTE — PROGRESS NOTE ADULT - NSHPATTENDINGPLANDISCUSS_GEN_ALL_CORE
Dr. Tabor (hospitalist); case also discussed with outpatient hematologist (Dr. Allred)
Dr. Vale (hospitalist); case also discussed with outpatient hematologist (Dr. Allred)
pt ,primary team
pt, pt's wife, consultants
pt, pt's family, consultants

## 2018-08-10 NOTE — PROGRESS NOTE ADULT - SUBJECTIVE AND OBJECTIVE BOX
Patient is a 84y old  Male who presents with a chief complaint of Petechial Rash and Bleeding Oral Ulcer, and admitted for ITP.    INTERVAL EVENTS: Patient reports new lesion in mouth just under his upper lip. Denies oral bleeding, SOB, CP, n/v, or leg pain. Rash on leg persists.    REVIEW OF SYSTEMS:  CONSTITUTIONAL: No fever or chills  HEENT:  No headache, no sore throat. +oral lesion  RESPIRATORY: No cough, wheezing, or shortness of breath  CARDIOVASCULAR: No chest pain, palpitations, or leg swelling  GASTROINTESTINAL: No abd pain, nausea, vomiting, or diarrhea  GENITOURINARY: No dysuria, frequency, or hematuria  SKIN: Rash on leg persists    Vital Signs Last 24 Hrs  T(C): 36.8 (10 Aug 2018 08:16), Max: 37.1 (10 Aug 2018 00:06)  T(F): 98.3 (10 Aug 2018 08:16), Max: 98.8 (10 Aug 2018 00:06)  HR: 57 (10 Aug 2018 08:16) (57 - 67)  BP: 131/80 (10 Aug 2018 08:16) (130/83 - 146/82)  BP(mean): --  RR: 17 (10 Aug 2018 08:16) (17 - 18)  SpO2: 98% (10 Aug 2018 08:16) (95% - 98%)    PHYSICAL EXAM:  GENERAL: NAD  HEENT:  EOMI,  small red non-bleeding papule L inner lip  CHEST/LUNG:  CTA b/l, no rales, wheezes, or rhonchi  HEART:  RRR, S1, S2  ABDOMEN:  BS+, soft, nontender, nondistended  EXTREMITIES: no edema, cyanosis, or calf tenderness  NERVOUS SYSTEM: AA&Ox3  SKIN: non blanching petechial rash on bilateral LE below the knee     MEDICATIONS  (STANDING):  benzocaine 15 mG/menthol 3.6 mG Lozenge 1 Lozenge Oral five times a day  dextrose 5%. 1000 milliLiter(s) (50 mL/Hr) IV Continuous <Continuous>  dextrose 50% Injectable 12.5 Gram(s) IV Push once  dextrose 50% Injectable 25 Gram(s) IV Push once  dextrose 50% Injectable 25 Gram(s) IV Push once  docusate sodium 100 milliGRAM(s) Oral two times a day  insulin lispro (HumaLOG) corrective regimen sliding scale   SubCutaneous three times a day before meals  insulin lispro (HumaLOG) corrective regimen sliding scale   SubCutaneous at bedtime  levothyroxine 75 MICROGram(s) Oral daily  metoprolol succinate ER 25 milliGRAM(s) Oral daily  multivitamin 1 Tablet(s) Oral daily  pantoprazole    Tablet 40 milliGRAM(s) Oral before breakfast  predniSONE   Tablet 60 milliGRAM(s) Oral daily  simvastatin 40 milliGRAM(s) Oral at bedtime    MEDICATIONS  (PRN):  dextrose 40% Gel 15 Gram(s) Oral once PRN Blood Glucose LESS THAN 70 milliGRAM(s)/deciliter  diphenhydrAMINE   Capsule 25 milliGRAM(s) Oral at bedtime PRN Insomnia  glucagon  Injectable 1 milliGRAM(s) IntraMuscular once PRN Glucose LESS THAN 70 milligrams/deciliter      Allergies    No Known Allergies    Intolerances          LABS:                        14.5   6.45  )-----------( <3.0     ( 10 Aug 2018 07:08 )             40.2     CBC Full  -  ( 10 Aug 2018 07:08 )  WBC Count : 6.45 K/uL  Hemoglobin : 14.5 g/dL  Hematocrit : 40.2 %  Platelet Count - Automated : <3.0 K/uL  Mean Cell Volume : 85.9 fl  Mean Cell Hemoglobin : 31.0 pg  Mean Cell Hemoglobin Concentration : 36.1 gm/dL  Auto Neutrophil # : x  Auto Lymphocyte # : x  Auto Monocyte # : x  Auto Eosinophil # : x  Auto Basophil # : x  Auto Neutrophil % : x  Auto Lymphocyte % : x  Auto Monocyte % : x  Auto Eosinophil % : x  Auto Basophil % : x    10 Aug 2018 07:08    138    |  105    |  30     ----------------------------<  146    4.0     |  23     |  1.10     Ca    8.4        10 Aug 2018 07:08          CAPILLARY BLOOD GLUCOSE      POCT Blood Glucose.: 151 mg/dL (10 Aug 2018 07:56)  POCT Blood Glucose.: 190 mg/dL (09 Aug 2018 21:59)  POCT Blood Glucose.: 130 mg/dL (09 Aug 2018 16:31)  POCT Blood Glucose.: 120 mg/dL (09 Aug 2018 11:34)          RADIOLOGY & ADDITIONAL TESTS:    Personally reviewed.     Consultant(s) Notes Reviewed:  [x] YES  [ ] NO    Care Discussed with [x] Consultants  [x] Patient  [ ] Family  [ ]      [ ] Other; RN  DVT ppx Patient is a 84y old  Male who presents with a chief complaint of Petechial Rash and Bleeding Oral Ulcer, and admitted for ITP.    INTERVAL EVENTS: Patient reports new lesion in mouth just under his upper lip. Denies oral bleeding, SOB, CP, n/v, or leg pain. Rash on leg persists.    REVIEW OF SYSTEMS:  CONSTITUTIONAL: No fever or chills  HEENT:  No headache, no sore throat. +oral lesion  RESPIRATORY: No cough, wheezing, or shortness of breath  CARDIOVASCULAR: No chest pain, palpitations, or leg swelling  GASTROINTESTINAL: No abd pain, nausea, vomiting, or diarrhea  GENITOURINARY: No dysuria, frequency, or hematuria  SKIN: Rash on leg persists    Vital Signs Last 24 Hrs  T(C): 36.8 (10 Aug 2018 08:16), Max: 37.1 (10 Aug 2018 00:06)  T(F): 98.3 (10 Aug 2018 08:16), Max: 98.8 (10 Aug 2018 00:06)  HR: 57 (10 Aug 2018 08:16) (57 - 67)  BP: 131/80 (10 Aug 2018 08:16) (130/83 - 146/82)  BP(mean): --  RR: 17 (10 Aug 2018 08:16) (17 - 18)  SpO2: 98% (10 Aug 2018 08:16) (95% - 98%)    PHYSICAL EXAM:  GENERAL: NAD  HEENT:  EOMI,  small red non-bleeding papule L inner lip  CHEST/LUNG:  CTA b/l, no rales, wheezes, or rhonchi  HEART:  RRR, S1, S2  ABDOMEN:  BS+, soft, nontender, nondistended  EXTREMITIES: no edema, cyanosis, or calf tenderness  NERVOUS SYSTEM: AA&Ox3  SKIN: non blanching petechial rash on bilateral LE below the knee     MEDICATIONS  (STANDING):  benzocaine 15 mG/menthol 3.6 mG Lozenge 1 Lozenge Oral five times a day  dextrose 5%. 1000 milliLiter(s) (50 mL/Hr) IV Continuous <Continuous>  dextrose 50% Injectable 12.5 Gram(s) IV Push once  dextrose 50% Injectable 25 Gram(s) IV Push once  dextrose 50% Injectable 25 Gram(s) IV Push once  docusate sodium 100 milliGRAM(s) Oral two times a day  insulin lispro (HumaLOG) corrective regimen sliding scale   SubCutaneous three times a day before meals  insulin lispro (HumaLOG) corrective regimen sliding scale   SubCutaneous at bedtime  levothyroxine 75 MICROGram(s) Oral daily  metoprolol succinate ER 25 milliGRAM(s) Oral daily  multivitamin 1 Tablet(s) Oral daily  pantoprazole    Tablet 40 milliGRAM(s) Oral before breakfast  predniSONE   Tablet 60 milliGRAM(s) Oral daily  simvastatin 40 milliGRAM(s) Oral at bedtime    MEDICATIONS  (PRN):  dextrose 40% Gel 15 Gram(s) Oral once PRN Blood Glucose LESS THAN 70 milliGRAM(s)/deciliter  diphenhydrAMINE   Capsule 25 milliGRAM(s) Oral at bedtime PRN Insomnia  glucagon  Injectable 1 milliGRAM(s) IntraMuscular once PRN Glucose LESS THAN 70 milligrams/deciliter      Allergies    No Known Allergies    Intolerances          LABS:                        14.5   6.45  )-----------( <3.0     ( 10 Aug 2018 07:08 )             40.2     CBC Full  -  ( 10 Aug 2018 07:08 )  WBC Count : 6.45 K/uL  Hemoglobin : 14.5 g/dL  Hematocrit : 40.2 %  Platelet Count - Automated : <3.0 K/uL  Mean Cell Volume : 85.9 fl  Mean Cell Hemoglobin : 31.0 pg  Mean Cell Hemoglobin Concentration : 36.1 gm/dL  Auto Neutrophil # : x  Auto Lymphocyte # : x  Auto Monocyte # : x  Auto Eosinophil # : x  Auto Basophil # : x  Auto Neutrophil % : x  Auto Lymphocyte % : x  Auto Monocyte % : x  Auto Eosinophil % : x  Auto Basophil % : x    10 Aug 2018 07:08    138    |  105    |  30     ----------------------------<  146    4.0     |  23     |  1.10     Ca    8.4        10 Aug 2018 07:08          CAPILLARY BLOOD GLUCOSE      POCT Blood Glucose.: 151 mg/dL (10 Aug 2018 07:56)  POCT Blood Glucose.: 190 mg/dL (09 Aug 2018 21:59)  POCT Blood Glucose.: 130 mg/dL (09 Aug 2018 16:31)  POCT Blood Glucose.: 120 mg/dL (09 Aug 2018 11:34)          RADIOLOGY & ADDITIONAL TESTS:    Personally reviewed.     Consultant(s) Notes Reviewed:  [x] YES  [ ] NO

## 2018-08-11 LAB
ANION GAP SERPL CALC-SCNC: 9 MMOL/L — SIGNIFICANT CHANGE UP (ref 5–17)
BUN SERPL-MCNC: 33 MG/DL — HIGH (ref 7–23)
CALCIUM SERPL-MCNC: 8.1 MG/DL — LOW (ref 8.5–10.1)
CHLORIDE SERPL-SCNC: 107 MMOL/L — SIGNIFICANT CHANGE UP (ref 96–108)
CO2 SERPL-SCNC: 24 MMOL/L — SIGNIFICANT CHANGE UP (ref 22–31)
CREAT SERPL-MCNC: 0.97 MG/DL — SIGNIFICANT CHANGE UP (ref 0.5–1.3)
FOLATE SERPL-MCNC: >20 NG/ML — SIGNIFICANT CHANGE UP
GLUCOSE SERPL-MCNC: 142 MG/DL — HIGH (ref 70–99)
HCT VFR BLD CALC: 36.6 % — LOW (ref 39–50)
HGB BLD-MCNC: 12.9 G/DL — LOW (ref 13–17)
MCHC RBC-ENTMCNC: 30.6 PG — SIGNIFICANT CHANGE UP (ref 27–34)
MCHC RBC-ENTMCNC: 35.2 GM/DL — SIGNIFICANT CHANGE UP (ref 32–36)
MCV RBC AUTO: 86.7 FL — SIGNIFICANT CHANGE UP (ref 80–100)
NRBC # BLD: 0 /100 WBCS — SIGNIFICANT CHANGE UP (ref 0–0)
PLATELET # BLD AUTO: 8 K/UL — CRITICAL LOW (ref 150–400)
POTASSIUM SERPL-MCNC: 3.9 MMOL/L — SIGNIFICANT CHANGE UP (ref 3.5–5.3)
POTASSIUM SERPL-SCNC: 3.9 MMOL/L — SIGNIFICANT CHANGE UP (ref 3.5–5.3)
RBC # BLD: 4.22 M/UL — SIGNIFICANT CHANGE UP (ref 4.2–5.8)
RBC # FLD: 13.2 % — SIGNIFICANT CHANGE UP (ref 10.3–14.5)
SODIUM SERPL-SCNC: 140 MMOL/L — SIGNIFICANT CHANGE UP (ref 135–145)
VIT B12 SERPL-MCNC: 1347 PG/ML — HIGH (ref 232–1245)
WBC # BLD: 6.92 K/UL — SIGNIFICANT CHANGE UP (ref 3.8–10.5)
WBC # FLD AUTO: 6.92 K/UL — SIGNIFICANT CHANGE UP (ref 3.8–10.5)

## 2018-08-11 PROCEDURE — 99232 SBSQ HOSP IP/OBS MODERATE 35: CPT

## 2018-08-11 RX ORDER — ACETAMINOPHEN 500 MG
650 TABLET ORAL ONCE
Qty: 0 | Refills: 0 | Status: COMPLETED | OUTPATIENT
Start: 2018-08-11 | End: 2018-08-11

## 2018-08-11 RX ORDER — DIPHENHYDRAMINE HCL 50 MG
25 CAPSULE ORAL ONCE
Qty: 0 | Refills: 0 | Status: DISCONTINUED | OUTPATIENT
Start: 2018-08-11 | End: 2018-08-12

## 2018-08-11 RX ORDER — ROMIPLOSTIM 250 UG/.5ML
75 INJECTION, POWDER, LYOPHILIZED, FOR SOLUTION SUBCUTANEOUS ONCE
Qty: 0 | Refills: 0 | Status: COMPLETED | OUTPATIENT
Start: 2018-08-11 | End: 2018-08-11

## 2018-08-11 RX ADMIN — Medication 1 MILLIGRAM(S): at 12:04

## 2018-08-11 RX ADMIN — Medication 100 MILLIGRAM(S): at 17:02

## 2018-08-11 RX ADMIN — ROMIPLOSTIM 75 MICROGRAM(S): 250 INJECTION, POWDER, LYOPHILIZED, FOR SOLUTION SUBCUTANEOUS at 15:30

## 2018-08-11 RX ADMIN — Medication 1 TABLET(S): at 12:04

## 2018-08-11 RX ADMIN — Medication 650 MILLIGRAM(S): at 15:29

## 2018-08-11 RX ADMIN — Medication 75 MICROGRAM(S): at 06:02

## 2018-08-11 RX ADMIN — PANTOPRAZOLE SODIUM 40 MILLIGRAM(S): 20 TABLET, DELAYED RELEASE ORAL at 06:02

## 2018-08-11 RX ADMIN — PREGABALIN 1000 MICROGRAM(S): 225 CAPSULE ORAL at 12:04

## 2018-08-11 RX ADMIN — Medication 25 MILLIGRAM(S): at 22:10

## 2018-08-11 RX ADMIN — Medication 1: at 12:04

## 2018-08-11 RX ADMIN — Medication 25 MILLIGRAM(S): at 06:02

## 2018-08-11 RX ADMIN — BENZOCAINE AND MENTHOL 1 LOZENGE: 5; 1 LIQUID ORAL at 08:03

## 2018-08-11 RX ADMIN — Medication 100 MILLIGRAM(S): at 06:02

## 2018-08-11 RX ADMIN — Medication 60 MILLIGRAM(S): at 06:02

## 2018-08-11 RX ADMIN — SIMVASTATIN 40 MILLIGRAM(S): 20 TABLET, FILM COATED ORAL at 22:09

## 2018-08-11 RX ADMIN — BENZOCAINE AND MENTHOL 1 LOZENGE: 5; 1 LIQUID ORAL at 12:04

## 2018-08-11 NOTE — PROGRESS NOTE ADULT - SUBJECTIVE AND OBJECTIVE BOX
All interim records and events noted.    awake alert      MEDICATIONS  (STANDING):  acetaminophen   Tablet 650 milliGRAM(s) Oral once  benzocaine 15 mG/menthol 3.6 mG Lozenge 1 Lozenge Oral five times a day  cyanocobalamin Injectable 1000 MICROGram(s) SubCutaneous daily  dextrose 5%. 1000 milliLiter(s) (50 mL/Hr) IV Continuous <Continuous>  dextrose 50% Injectable 12.5 Gram(s) IV Push once  dextrose 50% Injectable 25 Gram(s) IV Push once  dextrose 50% Injectable 25 Gram(s) IV Push once  docusate sodium 100 milliGRAM(s) Oral two times a day  folic acid 1 milliGRAM(s) Oral daily  insulin lispro (HumaLOG) corrective regimen sliding scale   SubCutaneous three times a day before meals  insulin lispro (HumaLOG) corrective regimen sliding scale   SubCutaneous at bedtime  levothyroxine 75 MICROGram(s) Oral daily  metoprolol succinate ER 25 milliGRAM(s) Oral daily  multivitamin 1 Tablet(s) Oral daily  pantoprazole    Tablet 40 milliGRAM(s) Oral before breakfast  predniSONE   Tablet 60 milliGRAM(s) Oral daily  romiPLOStim Injectable 74 MICROGram(s) SubCutaneous once  simvastatin 40 milliGRAM(s) Oral at bedtime    MEDICATIONS  (PRN):  dextrose 40% Gel 15 Gram(s) Oral once PRN Blood Glucose LESS THAN 70 milliGRAM(s)/deciliter  diphenhydrAMINE   Capsule 25 milliGRAM(s) Oral once PRN Rash and/or Itching  diphenhydrAMINE   Capsule 25 milliGRAM(s) Oral at bedtime PRN Insomnia  glucagon  Injectable 1 milliGRAM(s) IntraMuscular once PRN Glucose LESS THAN 70 milligrams/deciliter      Vital Signs Last 24 Hrs  T(C): 36.8 (11 Aug 2018 12:24), Max: 36.9 (11 Aug 2018 08:15)  T(F): 98.3 (11 Aug 2018 12:24), Max: 98.5 (11 Aug 2018 08:15)  HR: 74 (11 Aug 2018 12:24) (53 - 82)  BP: 119/62 (11 Aug 2018 12:24) (114/81 - 143/80)  BP(mean): --  RR: 18 (11 Aug 2018 12:24) (17 - 18)  SpO2: 94% (11 Aug 2018 12:24) (90% - 99%)    PHYSICAL EXAM  General: well developed  well nourished, in no acute distress  Head: atraumatic, normocephalic  ENT: sclera anicteric, buccal mucosa moist, mild dry brownish crusting on lips, no mucosal/tongue/upper palate hemorrhagic lesions  Neck: supple, trachea midline  CV: S1 S2, regular rate and rhythm  Lungs: clear to auscultation, no wheezes/rhonchi  Abdomen: soft, nontender, bowel sounds present, no palpable hepatosplenomegaly  Extrem: no clubbing/cyanosis/edema  Skin: multiple old petechial lesion adeline lower legs brownish in color, multiple ecchymosis right arm correl to phlebotomy sites  Neuro: alert and oriented X3,  no focal deficits      LABS:             12.9   6.92  )-----------( 8        ( 08-11 @ 08:39 )             36.6                12.9   7.57  )-----------( 1        ( 08-10 @ 22:20 )             36.8                14.5   6.45  )-----------( <3.0     ( 08-10 @ 07:08 )             40.2                14.3   4.91  )-----------( <3.0     ( 08-09 @ 08:06 )             41.5       08-11    140  |  107  |  33<H>  ----------------------------<  142<H>  3.9   |  24  |  0.97    Ca    8.1<L>      11 Aug 2018 08:39          RADIOLOGY & ADDITIONAL STUDIES:    IMPRESSION/RECOMMENDATIONS:

## 2018-08-11 NOTE — PROGRESS NOTE ADULT - SUBJECTIVE AND OBJECTIVE BOX
CHIEF COMPLAINT/INTERVAL HISTORY:  Pt. seen and evaluated for thrombocytopenia.  Pt. is in no distress.  No gross bleeding at this time.      REVIEW OF SYSTEMS:  No fever, CP, SOB, or abdominal pain.    Vital Signs Last 24 Hrs  T(C): 36.8 (11 Aug 2018 12:24), Max: 36.9 (11 Aug 2018 08:15)  T(F): 98.3 (11 Aug 2018 12:24), Max: 98.5 (11 Aug 2018 08:15)  HR: 74 (11 Aug 2018 12:24) (53 - 82)  BP: 119/62 (11 Aug 2018 12:24) (114/81 - 143/80)  BP(mean): --  RR: 18 (11 Aug 2018 12:24) (17 - 18)  SpO2: 94% (11 Aug 2018 12:24) (90% - 98%)    PHYSICAL EXAM:  GENERAL: NAD  HEENT: EOMI, hearing normal, conjunctiva and sclera clear, scabs noted along upper and lower lip, small papule on mucosa  Chest: CTA bilaterally, no wheezing  CV: S1S2, RRR,   GI: soft, +BS, NT/ND  Musculoskeletal: no edema  Psychiatric: affect nL, mood nL  Skin: warm and dry, petechial rash on bilateral LE    LABS:                        12.9   6.92  )-----------( 8        ( 11 Aug 2018 08:39 )             36.6     08-11    140  |  107  |  33<H>  ----------------------------<  142<H>  3.9   |  24  |  0.97    Ca    8.1<L>      11 Aug 2018 08:39      Assessment and Plan:  -ITP:  s/p 2 day course of  IVIG and 4 day course of pulse dose steroids.  Continue prednisone 60mg PO daily.  Pt. is s/p Nplate today.  Platelet 1 unit transfusion today.  Check bone marrow biopsy results.  Heme/Onc f/u  -Sick sinus syndrome:  +PPM since 2017  -Type 2 DM:  continue humalog sliding scale  -HTN:  continue Toprol XL 25mg PO daily  -HLD:  continue statin therapy  -CAD:  ASA on hold due to thrombocytopenia.  Continue B-blocker and statin   -Hypothyroidism:  continue synthroid 75mcg PO daily  -VTE ppx:  no chemical vte ppx 2/2 thrombocytopenia.

## 2018-08-11 NOTE — PROGRESS NOTE ADULT - ASSESSMENT
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 Daryl Allred, admitted 8/4/18 with 1 day history of petechie adeline legs and bleeding from bloody blisters oral mucosa/tongue,found isolated thrombocytopenia<3 working diagnosis of ITP  patient also reportedly had Augmentin about 2 weeks prior to acute drop in platelet count   - no response post 4days IV pulse Decadron and 2 days IVIG  - post bone marrow bx and asp last Wed, results still pending  - now on 60mg qD prednisone, plts continue to be low, on platelets support  - daughter very concerned, spoke w pt's outpatient Heme/Onc  Dr Allred, I've also spoken w Dr Allred  - Hgb and WBC still preserved, clinical picture still most c/w ITP although ?etiology, pt's outside physician still has concerns could be Hairy Cell clone or MDS inducint ITP picture  - in view of persistent severe thrombocytopenia, incr bleed risk, will start Nplate, 1mcg/kg weekly, first dose today, also transfuse one unit platelets  - follow daily CBC

## 2018-08-12 VITALS — HEART RATE: 59 BPM

## 2018-08-12 DIAGNOSIS — I62.9 NONTRAUMATIC INTRACRANIAL HEMORRHAGE, UNSPECIFIED: ICD-10-CM

## 2018-08-12 DIAGNOSIS — J96.01 ACUTE RESPIRATORY FAILURE WITH HYPOXIA: ICD-10-CM

## 2018-08-12 DIAGNOSIS — I95.9 HYPOTENSION, UNSPECIFIED: ICD-10-CM

## 2018-08-12 LAB
BASE EXCESS BLDA CALC-SCNC: -2.5 MMOL/L — LOW (ref -2–2)
BLOOD GAS COMMENTS ARTERIAL: SIGNIFICANT CHANGE UP
HCO3 BLDA-SCNC: 23 MMOL/L — SIGNIFICANT CHANGE UP (ref 23–27)
HOROWITZ INDEX BLDA+IHG-RTO: SIGNIFICANT CHANGE UP
PCO2 BLDA: 30 MMHG — LOW (ref 32–46)
PH BLDA: 7.46 — HIGH (ref 7.35–7.45)
PO2 BLDA: 189 MMHG — HIGH (ref 74–108)
SAO2 % BLDA: 100 % — HIGH (ref 92–96)

## 2018-08-12 PROCEDURE — 99291 CRITICAL CARE FIRST HOUR: CPT

## 2018-08-12 PROCEDURE — 70450 CT HEAD/BRAIN W/O DYE: CPT | Mod: 26

## 2018-08-12 RX ORDER — MORPHINE SULFATE 50 MG/1
2 CAPSULE, EXTENDED RELEASE ORAL ONCE
Qty: 0 | Refills: 0 | Status: DISCONTINUED | OUTPATIENT
Start: 2018-08-12 | End: 2018-08-12

## 2018-08-12 RX ORDER — NOREPINEPHRINE BITARTRATE/D5W 8 MG/250ML
0.05 PLASTIC BAG, INJECTION (ML) INTRAVENOUS
Qty: 8 | Refills: 0 | Status: DISCONTINUED | OUTPATIENT
Start: 2018-08-12 | End: 2018-08-12

## 2018-08-12 RX ORDER — ATROPINE SULFATE 0.1 MG/ML
1 SYRINGE (ML) INJECTION ONCE
Qty: 0 | Refills: 0 | Status: COMPLETED | OUTPATIENT
Start: 2018-08-12 | End: 2018-08-12

## 2018-08-12 RX ADMIN — Medication 6.97 MICROGRAM(S)/KG/MIN: at 05:12

## 2018-08-12 RX ADMIN — MORPHINE SULFATE 2 MILLIGRAM(S): 50 CAPSULE, EXTENDED RELEASE ORAL at 08:32

## 2018-08-12 RX ADMIN — Medication 1 MILLIGRAM(S): at 05:06

## 2018-08-12 NOTE — PROGRESS NOTE ADULT - ASSESSMENT
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  Medical decision making inculding Initiating plan of care, reviewing data, reviewing radiology,direct patient bedside evaluation and interpretation of vital signs, any necessary ventilator management , discusion with multidisciplinary team, discussing goals of care with patient/family, all non inclusive of procedures

## 2018-08-12 NOTE — PROGRESS NOTE ADULT - PROBLEM SELECTOR PLAN 3
- s/p PPM placement in 2017  - continue to monitor heart rhythm.
- Hypertensive -- likely exacerbated by the pulse-dose steroids.   - Continue home metoprolol.
- s/p PPM placement in 2017  - continue to monitor heart rhythm.
- Hypertensive -- likely exacerbated by the pulse-dose steroids.   - Continue home metoprolol.
-patient currently on Levophed, will titrate for MAP 65-70  -goal UOP >0.5 cc/kg/hr  -will continue to support blood pressure until family is ready to make advance directive care discission
- s/p PPM placement in 2017  - continue to monitor heart rhythm.
- s/p PPM placement in 2017  - continue to monitor heart rhythm.

## 2018-08-12 NOTE — PROGRESS NOTE ADULT - PROBLEM SELECTOR PROBLEM 3
Sick sinus syndrome
HTN (hypertension)
Sick sinus syndrome
HTN (hypertension)
Hypotension
Sick sinus syndrome
Sick sinus syndrome

## 2018-08-12 NOTE — PROGRESS NOTE ADULT - PROVIDER SPECIALTY LIST ADULT
Critical Care
Heme/Onc
Hospitalist
MICU
Hospitalist

## 2018-08-12 NOTE — DISCHARGE NOTE FOR THE EXPIRED PATIENT - HOSPITAL COURSE
84 year old male with 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 Daryl Allred, s/p PPM 2017 for sick sinus syndrome , Diabetes type 2, HTN, hypothyroidism and HLD presented with a chief complaint of papule and bleeding from the mouth and petechial rash in the bilateral lower extremities, and found to have profound thrombocytopenia.  It was suspected to be ITP (possibly due to augmentin about 2wks prior) and he was treated with IVIg, pulse decadron then prednisone.  There was no significant response in platelets to these interventions and BMBx was performed due to concerns that this may represent hematologic malignancy or MDS.  Nplate treatment was started 8/11.  On the morning of   Tonight He was found by his nurse to be unresponsive in bed ( had been fully alert and oriented prior), pupils pinpiont and non eracitve to light and no response to painful stinuli. He was emergently intubated for acute hypoxic respiratory failure ( o2 sats dropping into the 60's and becoming bradycardic) and was given atropine 1 mg IVP bradycardia ( HR in high 20's) then taken to Cat Scan for ct brain non contrast which found acute intracranial hemorrhage to brainstem with extension into third and forth ventricles. He transferred to ICU were he was placed on Levophed to support MAP greater than 65 when his systolic blood pressure began to drop into the 60's. The neuro examination at this point was no response to painful stimuli, no gag, corneal or cold caloric reflex and he was not overbreathing the ventilator ( please note this was not ment for brain death documentation / apnea test not performed )     I had an initial discussion with family ( wife and daughter) who arrived quickly to ICU concerning advanced directives. His their wishes at first were for him to be an organ donor and to start that process. I initiated conversation with organ donor network concerning that request and was given a case number  [id number],  but the wife has changed her mind and now wishes to not go forward with organ donation and make her  DNR with withdrawal of care / comfort care and allow him to pass naturally. 84 year old male with 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 Daryl Allred, s/p PPM 2017 for sick sinus syndrome , Diabetes type 2, HTN, hypothyroidism and HLD presented with a chief complaint of papule and bleeding from the mouth and petechial rash in the bilateral lower extremities, and found to have profound thrombocytopenia.  It was suspected to be ITP (possibly due to augmentin about 2wks prior) and he was treated with IVIg, pulse decadron then prednisone.  There was no significant response in platelets to these interventions and BMBx was performed due to concerns that this may represent hematologic malignancy or MDS.  Nplate treatment was started .  On the morning of  was found unresponsive, and CTH found acute intracranial hemorrhage to brainstem with extension into third and forth ventricles. He was intubated for airway protection, transferred to ICU, and required vasopressors for treatment of shock.  His neuro exam was consistent with severe brain damage including loss of many brainstem reflexes, though full brain death exam was not performed.  Given this catastrophic hemorrhage there is essentially no realistic hope of neurologic recover and his family wished to pursue comfort measures only.  He was removed from the ventilator and all life support medications, and  peacefully with his family at bedside.

## 2018-08-12 NOTE — PROGRESS NOTE ADULT - PROBLEM SELECTOR PROBLEM 2
Oral lesion
Intracranial hemorrhage
Oral lesion

## 2018-08-12 NOTE — CHART NOTE - NSCHARTNOTEFT_GEN_A_CORE
Resident Rapid Response Note Followup    Patient currently intubated, without gag, no response to pain, pupils now dilated.     Vital Signs Last 24 Hrs  T(C): 36.2 (12 Aug 2018 05:00), Max: 36.9 (11 Aug 2018 08:15)  T(F): 97.1 (12 Aug 2018 05:00), Max: 98.5 (11 Aug 2018 08:15)  HR: 78 (12 Aug 2018 05:00) (53 - 78)  BP: 57/41 (12 Aug 2018 05:00) (57/41 - 130/76)  BP(mean): 46 (12 Aug 2018 05:00) (46 - 56)  RR: 20 (12 Aug 2018 05:00) (18 - 34)  SpO2: 100% (12 Aug 2018 05:00) (94% - 100%)    Physical Exam:  General: Frail  HEENT: +dilated pupils  Neurology: A&Ox0, no response to pain   Respiratory: currently intubated  Extremities: + bilateral petechial rash     Assessment/Plan:    - Patient currently brain dead  - Supported in the ICU with plan for organ donation

## 2018-08-12 NOTE — PROGRESS NOTE ADULT - SUBJECTIVE AND OBJECTIVE BOX
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 Daryl Allred,, s/p PPM 2017 for sick sinus syndrome , Diabetes type 2, HTN, hypothyroidism and HLD presented with a chief complaint of papule and bleeding from the mouth and petechial rash in the bilateral lower extremities. He was admitted with ITP ( platlets less than 3), he reportedly had Augmentin about 2 weeks prior to acute drop in platelet count. He was admitted to floor on the 4th, recived 4days IV pulse Decadron and 2 days IVIG without response ( s/p bone marrow bisopsy last wed results still pending ) and has been on 60mg qD prednisone and platelets support.     Tonight He was found by his nurse to be unresponsive in bed ( had been fully alert and oriented prior), pupils pinpiont and non eracitve to light and no response to painful stinuli. 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  Hairy cell leukemia, in remission  Aortic stenosis  Kidney stone  Sick sinus syndrome: s/p PPM 2008, 2017  CAD (coronary artery disease): denies any hx of MI or cardiac stents  Lumbar herniated disc  Lumbar radiculopathy  DM (diabetes mellitus): type 2  HLD (hyperlipidemia)  HTN (hypertension)  Hypothyroid  Spinal stenosis  H/O hernia repair  Artificial pacemaker: [employer] [id number] model A2DR01 generater change 2017    FAMILY HISTORY:  No pertinent family history in first degree relatives      Vitals   Vital Signs Last 24 Hrs  T(C): 36.2 (12 Aug 2018 05:00), Max: 36.9 (11 Aug 2018 08:15)  T(F): 97.1 (12 Aug 2018 05:00), Max: 98.5 (11 Aug 2018 08:15)  HR: 78 (12 Aug 2018 05:00) (53 - 78)  BP: 57/41 (12 Aug 2018 05:00) (57/41 - 130/76)  BP(mean): 46 (12 Aug 2018 05:00) (46 - 56)  RR: 20 (12 Aug 2018 05:00) (18 - 34)  SpO2: 100% (12 Aug 2018 05:00) (94% - 100%)    I&O's Summary    10 Aug 2018 07:01  -  11 Aug 2018 07:00  --------------------------------------------------------  IN: 211 mL / OUT: 0 mL / NET: 211 mL        LABS  08-11    140  |  107  |  33<H>  ----------------------------<  142<H>  3.9   |  24  |  0.97    Ca    8.1<L>      11 Aug 2018 08:39                            12.9   6.92  )-----------( 8        ( 11 Aug 2018 08:39 )             36.6             CAPILLARY BLOOD GLUCOSE  149 (12 Aug 2018 04:38)      POCT Blood Glucose.: 149 mg/dL (12 Aug 2018 03:51)        VENT SETTINGS   Mode: AC/ CMV (Assist Control/ Continuous Mandatory Ventilation)  RR (machine): 20  TV (machine): 500  FiO2: 50  PEEP: 5  ITime: 1  MAP: 9  PIP: 23      Meds  MEDICATIONS  (STANDING):  atropine Injectable 1 milliGRAM(s) IV Push once  benzocaine 15 mG/menthol 3.6 mG Lozenge 1 Lozenge Oral five times a day  cyanocobalamin Injectable 1000 MICROGram(s) SubCutaneous daily  dextrose 5%. 1000 milliLiter(s) (50 mL/Hr) IV Continuous <Continuous>  dextrose 50% Injectable 12.5 Gram(s) IV Push once  dextrose 50% Injectable 25 Gram(s) IV Push once  dextrose 50% Injectable 25 Gram(s) IV Push once  docusate sodium 100 milliGRAM(s) Oral two times a day  folic acid 1 milliGRAM(s) Oral daily  insulin lispro (HumaLOG) corrective regimen sliding scale   SubCutaneous three times a day before meals  insulin lispro (HumaLOG) corrective regimen sliding scale   SubCutaneous at bedtime  levothyroxine 75 MICROGram(s) Oral daily  metoprolol succinate ER 25 milliGRAM(s) Oral daily  multivitamin 1 Tablet(s) Oral daily  norepinephrine Infusion 0.05 MICROgram(s)/kG/Min (6.975 mL/Hr) IV Continuous <Continuous>  pantoprazole    Tablet 40 milliGRAM(s) Oral before breakfast  predniSONE   Tablet 60 milliGRAM(s) Oral daily  simvastatin 40 milliGRAM(s) Oral at bedtime      REVIEW OF SYSTEMS:    CONSTITUTIONAL: No fever, weight loss, or fatigue  EYES: No eye pain, visual disturbances, or discharge  ENMT:  No difficulty hearing, tinnitus, vertigo; No sinus or throat pain  NECK: No pain or stiffness  BREASTS: No pain, masses, or nipple discharge  RESPIRATORY: No cough, wheezing, chills or hemoptysis; No shortness of breath  CARDIOVASCULAR: No chest pain, palpitations, dizziness, or leg swelling  GASTROINTESTINAL: No abdominal or epigastric pain. No nausea, vomiting, or hematemesis; No diarrhea or constipation. No melena or hematochezia.  GENITOURINARY: No dysuria, frequency, hematuria, or incontinence  NEUROLOGICAL: No headaches, memory loss, loss of strength, numbness, or tremors  SKIN: No itching, burning, rashes, or lesions   LYMPH NODES: No enlarged glands  ENDOCRINE: No heat or cold intolerance; No hair loss  MUSCULOSKELETAL: No joint pain or swelling; No muscle, back, or extremity pain  PSYCHIATRIC: No depression, anxiety, mood swings, or difficulty sleeping  HEME/LYMPH: No easy bruising, or bleeding gums  ALLERY AND IMMUNOLOGIC: No hives or eczema      Physicial Exam:     Constitutional: NAD, well-groomed, well-developed  HEENT: PERRLA, EOMI, no drainage or redness  Neck: No bruits; no thyromegaly or nodules,  No JVD  Back: Normal spine flexure, No CVA tenderness, No deformity or limitation of movement  Respiratory: Breath Sounds equal & clear to percussion & auscultation, no accessory muscle use  Cardiovascular: Regular rate & rhythm, normal S1, S2; no murmurs, gallops or rubs; no S3, S4  Gastrointestinal: Soft, non-tender, non distended no hepatosplenomegaly, normal bowel sounds  Extremities: No peripheral edema, No cyanosis, clubbing   Vascular: Equal and normal pulses: 2+ peripheral pulses throughout  Neurological: GCS:    A&O x 3; no sensory, motor  deficits, normal reflexes  Psychiatric: Normal mood, normal affect  Musculoskeletal: No joint pain, swelling or deformity; no limitation of movement  Skin: No rashes 84y  Male  No Known Allergies    CC: Patient is a 84y old  Male who presents with a chief complaint of Petechial Rash and Bleeding Oral bleeding     HPI:  84 year old male with 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 Daryl Allred,, s/p PPM 2017 for sick sinus syndrome , Diabetes type 2, HTN, hypothyroidism and HLD presented with a chief complaint of papule and bleeding from the mouth and petechial rash in the bilateral lower extremities. He was admitted with ITP ( platlets less than 3), he reportedly had Augmentin about 2 weeks prior to acute drop in platelet count. He was admitted to floor on the 4th, recived 4days IV pulse Decadron and 2 days IVIG without response ( s/p bone marrow bisopsy last wed results still pending ) and has been on 60mg qD prednisone and platelets support.     Tonight He was found by his nurse to be unresponsive in bed ( had been fully alert and oriented prior), pupils pinpiont and non eracitve to light and no response to painful stinuli. He was emergently intubated for acute hypoxic respiratory failure ( o2 sats dropping into the 60's and becoming bradycardic) and was given atropine 1 mg IVP bradycardia ( HR in high 20's) then taken to Cat Scan for ct brain non contrast which found acute intracranial hemorrhage to brainstem with extension into third and forth ventricles. He transferred to ICU were he was placed on Levophed to support MAP greater than 65 when his systolic blood pressure began to drop into the 60's. The neuro examination at this point was no response to painful stimuli, no gag, corneal or cold caloric reflex and he was not overbreathing the ventilator ( please note this was not ment for brain death documentation / apnea test not performed )     I had an initial discussion with family ( wife and daughter) who arrived quickly to ICU concerning advanced directives. His their wishes at first were for him to be an organ donor and to start that process. I initiated conversation with organ donor network concerning that request and was given a case number  [id number],  but the wife has changed her mind and now wishes to not go forward with organ donation and make her  DNR with withdrawal of care / comfort care and allow him to pass naturally.       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  Hairy cell leukemia, in remission  Aortic stenosis  Kidney stone  Sick sinus syndrome: s/p PPM 2008, 2017  CAD (coronary artery disease): denies any hx of MI or cardiac stents  Lumbar herniated disc  Lumbar radiculopathy  DM (diabetes mellitus): type 2  HLD (hyperlipidemia)  HTN (hypertension)  Hypothyroid  Spinal stenosis  H/O hernia repair  Artificial pacemaker: 2008Medtronic [id number] model A2DR01 generater change 2017    FAMILY HISTORY:  No pertinent family history in first degree relatives      Vitals   Vital Signs Last 24 Hrs  T(C): 36.2 (12 Aug 2018 05:00), Max: 36.9 (11 Aug 2018 08:15)  T(F): 97.1 (12 Aug 2018 05:00), Max: 98.5 (11 Aug 2018 08:15)  HR: 78 (12 Aug 2018 05:00) (53 - 78)  BP: 57/41 (12 Aug 2018 05:00) (57/41 - 130/76)  BP(mean): 46 (12 Aug 2018 05:00) (46 - 56)  RR: 20 (12 Aug 2018 05:00) (18 - 34)  SpO2: 100% (12 Aug 2018 05:00) (94% - 100%)    I&O's Summary    10 Aug 2018 07:01  -  11 Aug 2018 07:00  --------------------------------------------------------  IN: 211 mL / OUT: 0 mL / NET: 211 mL      LABS  08-11    140  |  107  |  33<H>  ----------------------------<  142<H>  3.9   |  24  |  0.97    Ca    8.1<L>      11 Aug 2018 08:39                        12.9   6.92  )-----------( 8        ( 11 Aug 2018 08:39 )             36.    CAPILLARY BLOOD GLUCOSE  149 (12 Aug 2018 04:38)    POCT Blood Glucose.: 149 mg/dL (12 Aug 2018 03:51)      VENT SETTINGS   Mode: AC/ CMV (Assist Control/ Continuous Mandatory Ventilation)  RR (machine): 20  TV (machine): 500  FiO2: 50  PEEP: 5  ITime: 1  MAP: 9  PIP: 23      Meds  MEDICATIONS  (STANDING):  atropine Injectable 1 milliGRAM(s) IV Push once  benzocaine 15 mG/menthol 3.6 mG Lozenge 1 Lozenge Oral five times a day  cyanocobalamin Injectable 1000 MICROGram(s) SubCutaneous daily  dextrose 5%. 1000 milliLiter(s) (50 mL/Hr) IV Continuous <Continuous>  dextrose 50% Injectable 12.5 Gram(s) IV Push once  dextrose 50% Injectable 25 Gram(s) IV Push once  dextrose 50% Injectable 25 Gram(s) IV Push once  docusate sodium 100 milliGRAM(s) Oral two times a day  folic acid 1 milliGRAM(s) Oral daily  insulin lispro (HumaLOG) corrective regimen sliding scale   SubCutaneous three times a day before meals  insulin lispro (HumaLOG) corrective regimen sliding scale   SubCutaneous at bedtime  levothyroxine 75 MICROGram(s) Oral daily  metoprolol succinate ER 25 milliGRAM(s) Oral daily  multivitamin 1 Tablet(s) Oral daily  norepinephrine Infusion 0.05 MICROgram(s)/kG/Min (6.975 mL/Hr) IV Continuous <Continuous>  pantoprazole    Tablet 40 milliGRAM(s) Oral before breakfast  predniSONE   Tablet 60 milliGRAM(s) Oral daily  simvastatin 40 milliGRAM(s) Oral at bedtime      REVIEW OF SYSTEMS:    unable to obtain due to unresponsive state      Physicial Exam:     Constitutional: unresponsive and on mechanical ventilation after intubation  HEENT: pupils pinpoint and non reactive to light   Neck: No bruits;  No JVD  Respiratory: Breath Sounds equal & clear to percussion & auscultation, no accessory muscle use  Cardiovascular: Regular rate & rhythm, normal S1, S2; no murmurs, gallops or rubs; no S3, S4  Gastrointestinal: Soft, non-tender, non distended no hepatosplenomegaly, normal bowel sounds  Extremities: No peripheral edema, No cyanosis, clubbing   Vascular: Equal and normal pulses: 2+ peripheral pulses throughout  Neurological: GCS:3   no response to painful stimuli, no gag, corneal or cold caloric reflex and he was not overbreathing the ventilator         Skin: No rashes    EXAM: CT BRAIN STROKE PROTOCOL    PROCDURE  INTERPRETATION: HISTORY: Stroke alert. History of ITP.   COMPARISON: CT head 10/25/2010     TECHNIQUE: Axial noncontrast CT images from the skull base to the vertex   were obtained and submitted for interpretation. Coronal and sagittal   reformatted images were performed. Bone and soft tissue windows were   evaluated.     FINDINGS:   Diffuse brainstem hemorrhage centered in the pontomedullary junction with   intraventricular extension into the fourth and third ventricle is evident.   Mildly prominent ventricular size with temporal horn flaring suggesting   early signs of hydrocephalus. No midline shift.     Mild chronic hypervascular changes in the periventricular deep cerebral   white matter is evident. Chronic bilateral lacunar type infarctions in the   gangliocapsular regions anteriorly. Atheromatous ossifications along the   carotid siphons are noted.     Paranasal sinuses and mastoid air cells are clear. Calvarium is intact. Lens   replacements.     IMPRESSION:   Diffuse brainstem hemorrhage centered in the pontomedullary junction with   intraventricular extension into the fourth and third ventricle. Early signs   of hydrocephalus with mild bilateral temporal horn flaring. 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 Daryl Allred,, s/p PPM 2017 for sick sinus syndrome , Diabetes type 2, HTN, hypothyroidism and HLD presented with a chief complaint of papule and bleeding from the mouth and petechial rash in the bilateral lower extremities. He was admitted with ITP ( platlets less than 3), he reportedly had Augmentin about 2 weeks prior to acute drop in platelet count. He was admitted to floor on the 4th, recived 4days IV pulse Decadron and 2 days IVIG without response ( s/p bone marrow bisopsy last wed results still pending ) and has been on 60mg qD prednisone and platelets support.     Tonight He was found by his nurse to be unresponsive in bed ( had been fully alert and oriented prior), pupils pinpiont and non eracitve to light and no response to painful stinuli. He was emergently intubated for acute hypoxic respiratory failure ( o2 sats dropping into the 60's and becoming bradycardic) and was given atropine 1 mg IVP bradycardia ( HR in high 20's) then taken to Cat Scan for ct brain non contrast which found acute intracranial hemorrhage to brainstem with extension into third and forth ventricles. He transferred to ICU were he was placed on Levophed to support MAP greater than 65 when his systolic blood pressure began to drop into the 60's. The neuro examination at this point was no response to painful stimuli, no gag, corneal or cold caloric reflex and he was not overbreathing the ventilator ( please note this was not ment for brain death documentation / apnea test not performed )     I had an initial discussion with family ( wife and daughter) who arrived quickly to ICU concerning advanced directives. His their wishes at first were for him to be an organ donor and to start that process. I initiated conversation with organ donor network concerning that request and was given a case number  [id number],  but the wife has changed her mind and now wishes to not go forward with organ donation and make her  DNR with withdrawal of care / comfort care and allow him to pass naturally.       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  Hairy cell leukemia, in remission  Aortic stenosis  Kidney stone  Sick sinus syndrome: s/p PPM 2008, 2017  CAD (coronary artery disease): denies any hx of MI or cardiac stents  Lumbar herniated disc  Lumbar radiculopathy  DM (diabetes mellitus): type 2  HLD (hyperlipidemia)  HTN (hypertension)  Hypothyroid  Spinal stenosis  H/O hernia repair  Artificial pacemaker: 2008Medtronic [id number] model A2DR01 generater change 2017    FAMILY HISTORY:  No pertinent family history in first degree relatives      Vitals   Vital Signs Last 24 Hrs  T(C): 36.2 (12 Aug 2018 05:00), Max: 36.9 (11 Aug 2018 08:15)  T(F): 97.1 (12 Aug 2018 05:00), Max: 98.5 (11 Aug 2018 08:15)  HR: 78 (12 Aug 2018 05:00) (53 - 78)  BP: 57/41 (12 Aug 2018 05:00) (57/41 - 130/76)  BP(mean): 46 (12 Aug 2018 05:00) (46 - 56)  RR: 20 (12 Aug 2018 05:00) (18 - 34)  SpO2: 100% (12 Aug 2018 05:00) (94% - 100%)    I&O's Summary    10 Aug 2018 07:01  -  11 Aug 2018 07:00  --------------------------------------------------------  IN: 211 mL / OUT: 0 mL / NET: 211 mL      LABS  08-11    140  |  107  |  33<H>  ----------------------------<  142<H>  3.9   |  24  |  0.97    Ca    8.1<L>      11 Aug 2018 08:39                        12.9   6.92  )-----------( 8        ( 11 Aug 2018 08:39 )             36.    CAPILLARY BLOOD GLUCOSE  149 (12 Aug 2018 04:38)    POCT Blood Glucose.: 149 mg/dL (12 Aug 2018 03:51)      VENT SETTINGS   Mode: AC/ CMV (Assist Control/ Continuous Mandatory Ventilation)  RR (machine): 20  TV (machine): 500  FiO2: 50  PEEP: 5  ITime: 1  MAP: 9  PIP: 23      Meds  MEDICATIONS  (STANDING):  atropine Injectable 1 milliGRAM(s) IV Push once  benzocaine 15 mG/menthol 3.6 mG Lozenge 1 Lozenge Oral five times a day  cyanocobalamin Injectable 1000 MICROGram(s) SubCutaneous daily  dextrose 5%. 1000 milliLiter(s) (50 mL/Hr) IV Continuous <Continuous>  dextrose 50% Injectable 12.5 Gram(s) IV Push once  dextrose 50% Injectable 25 Gram(s) IV Push once  dextrose 50% Injectable 25 Gram(s) IV Push once  docusate sodium 100 milliGRAM(s) Oral two times a day  folic acid 1 milliGRAM(s) Oral daily  insulin lispro (HumaLOG) corrective regimen sliding scale   SubCutaneous three times a day before meals  insulin lispro (HumaLOG) corrective regimen sliding scale   SubCutaneous at bedtime  levothyroxine 75 MICROGram(s) Oral daily  metoprolol succinate ER 25 milliGRAM(s) Oral daily  multivitamin 1 Tablet(s) Oral daily  norepinephrine Infusion 0.05 MICROgram(s)/kG/Min (6.975 mL/Hr) IV Continuous <Continuous>  pantoprazole    Tablet 40 milliGRAM(s) Oral before breakfast  predniSONE   Tablet 60 milliGRAM(s) Oral daily  simvastatin 40 milliGRAM(s) Oral at bedtime      REVIEW OF SYSTEMS:    unable to obtain due to unresponsive state      Physicial Exam:     Constitutional: unresponsive and on mechanical ventilation after intubation  HEENT: pupils pinpoint and non reactive to light   Neck: No bruits;  No JVD  Respiratory: Breath Sounds equal & clear to percussion & auscultation, no accessory muscle use  Cardiovascular: Regular rate & rhythm, normal S1, S2; no murmurs, gallops or rubs; no S3, S4  Gastrointestinal: Soft, non-tender, non distended no hepatosplenomegaly, normal bowel sounds  Extremities: No peripheral edema, No cyanosis, clubbing   Vascular: Equal and normal pulses: 2+ peripheral pulses throughout  Neurological: GCS:3   no response to painful stimuli, no gag, corneal or cold caloric reflex and he was not overbreathing the ventilator         Skin: No rashes    EXAM: CT BRAIN STROKE PROTOCOL    PROCDURE  INTERPRETATION: HISTORY: Stroke alert. History of ITP.   COMPARISON: CT head 10/25/2010     TECHNIQUE: Axial noncontrast CT images from the skull base to the vertex   were obtained and submitted for interpretation. Coronal and sagittal   reformatted images were performed. Bone and soft tissue windows were   evaluated.     FINDINGS:   Diffuse brainstem hemorrhage centered in the pontomedullary junction with   intraventricular extension into the fourth and third ventricle is evident.   Mildly prominent ventricular size with temporal horn flaring suggesting   early signs of hydrocephalus. No midline shift.     Mild chronic hypervascular changes in the periventricular deep cerebral   white matter is evident. Chronic bilateral lacunar type infarctions in the   gangliocapsular regions anteriorly. Atheromatous ossifications along the   carotid siphons are noted.     Paranasal sinuses and mastoid air cells are clear. Calvarium is intact. Lens   replacements.     IMPRESSION:   Diffuse brainstem hemorrhage centered in the pontomedullary junction with   intraventricular extension into the fourth and third ventricle. Early signs   of hydrocephalus with mild bilateral temporal horn flaring.

## 2018-08-12 NOTE — PROGRESS NOTE ADULT - PROBLEM SELECTOR PROBLEM 1
Thrombocytopenia
Acute respiratory failure with hypoxia and hypercapnia
Thrombocytopenia

## 2018-08-12 NOTE — CHART NOTE - NSCHARTNOTEFT_GEN_A_CORE
Resident Rapid Response Note    Rapid response called at 4AM for altered mental status. Patient was seen and examined at the bedside by the resident. Patient was disoriented, unable to follow commands, unable to open his right eye, lift his right arm or squeeze his right hand. Code stroke was called given rapid change from baseline mental status and focal neurologic deficits. While patient was being escorted to CT, patient began to desat, requiring intubation. CT scan revealed diffuse brainstem hemorrhage centered in the pontomedullary junction with intraventricular extension into the fourth and third ventricle. Patient was transferred to ICU and the family was informed in person.     Rapid Response Vital Signs:  BP: 189/100  HR: 65  RR: 18  Temp: 98.4  FS: 149  Sp02: 96%    Vital Signs Last 24 Hrs  T(C): 36.2 (12 Aug 2018 05:00), Max: 36.9 (11 Aug 2018 08:15)  T(F): 97.1 (12 Aug 2018 05:00), Max: 98.5 (11 Aug 2018 08:15)  HR: 78 (12 Aug 2018 05:00) (53 - 78)  BP: 57/41 (12 Aug 2018 05:00) (57/41 - 130/76)  BP(mean): 46 (12 Aug 2018 05:00) (46 - 56)  RR: 20 (12 Aug 2018 05:00) (18 - 34)  SpO2: 100% (12 Aug 2018 05:00) (94% - 100%)    Physical Exam:  General: altered  HEENT: NCAT, right eyelid droop  Neurology: A&Ox1, sensation intact, 0/5 strength in right arm and right leg, 2/5 strength in left arm and left leg.   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    Plan:  - Patient currently in ICU - family at bedside.  - Family discussion to make patient DNR/DNI, ICU to have discussion with family regarding comfort care.  - Call made to Dr. Nunes (heme-onc) as requested by family   - Will continue to follow.     Dr. Henderson PGY-1 [id number]     -Will continue to follow, RN to call if any changes. Resident Rapid Response Note    Rapid response called at 4AM for altered mental status. Patient was seen and examined at the bedside by the resident. Patient was disoriented, unable to follow commands, unable to open his right eye, lift his right arm or squeeze his right hand. Code stroke was called given rapid change from baseline mental status and focal neurologic deficits. While patient was being escorted to CT, patient began to desat, requiring intubation and was given 1mg IV atropine. CT scan revealed diffuse brainstem hemorrhage centered in the pontomedullary junction with intraventricular extension into the fourth and third ventricle. Patient was transferred to ICU and the family was informed in person.     Rapid Response Vital Signs:  BP: 189/100  HR: 65  RR: 18  Temp: 98.4  FS: 149  Sp02: 96%    Vital Signs Last 24 Hrs  T(C): 36.2 (12 Aug 2018 05:00), Max: 36.9 (11 Aug 2018 08:15)  T(F): 97.1 (12 Aug 2018 05:00), Max: 98.5 (11 Aug 2018 08:15)  HR: 78 (12 Aug 2018 05:00) (53 - 78)  BP: 57/41 (12 Aug 2018 05:00) (57/41 - 130/76)  BP(mean): 46 (12 Aug 2018 05:00) (46 - 56)  RR: 20 (12 Aug 2018 05:00) (18 - 34)  SpO2: 100% (12 Aug 2018 05:00) (94% - 100%)    Physical Exam:  General: altered  HEENT: NCAT, right eyelid droop  Neurology: A&Ox1, sensation intact, 0/5 strength in right arm and right leg, 2/5 strength in left arm and left leg.   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    Plan:  - Patient currently in ICU - family at bedside.  - Family discussion to make patient DNR/DNI, ICU to have discussion with family regarding comfort care.  - Call made to Dr. Nunes (heme-onc) as requested by family   - Will continue to follow.     Dr. Henderson PGY-1 [id number]     -Will continue to follow, RN to call if any changes.

## 2018-08-12 NOTE — PROCEDURE NOTE - ADDITIONAL PROCEDURE DETAILS
The paitent was placed in supine position,while at the same time sedtion obtained with Etomidate and Propofol.  Oral suctinoing performed to clear the airway. The patient was quickly ventilated via BVM to greater than 95% . Larengescope was inserted into oropharynx with a grade 1 view  , then 7.5 [place] entdotracheal tube was inserted under direct visuilazation of vocal cords.   The stylet was removed, the ballon was inflated, positive breath sounds were auscultated bilaterally , no gastric sounds were auscultated over stomach, positive color change on ETCO2 detector and pt's o2 sats increased to 98%. The ET tube was secured in place 23cm at the teeth and the patient  was placed on mech ventilation. Portable chest xray confirmed the ET tube to be 1-2 cm above the villa and new NG tube inplace in the stomach. The paitent was placed in supine position.  Oral suctinoing performed to clear the airway. The patient was quickly ventilated via BVM to greater than 95% . Larengescope was inserted into oropharynx with a grade 1 view  , then 7.5 [place] entdotracheal tube was inserted under direct visuilazation of vocal cords.   The stylet was removed, the ballon was inflated, positive breath sounds were auscultated bilaterally , no gastric sounds were auscultated over stomach, positive color change on ETCO2 detector and pt's o2 sats increased to 98%. The ET tube was secured in place 23cm at the teeth and the patient  was placed on mech ventilation. Portable chest xray confirmed the ET tube to be 1-2 cm above the villa and new NG tube inplace in the stomach.

## 2018-08-12 NOTE — PROGRESS NOTE ADULT - ATTENDING COMMENTS
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  Overnight with catastrophic brainstem hemorrhage resulting in coma and acute respiratory failure.      --brainstem hemorrhage is catastrophic, and there is essentially no hope of any neurologic recovery.  Given this his family elects for comfort measures only and removal of life support.  D/c all medications except for comfort meds and extubate.  This was discussed with Dr. Nunes who is in agreement with plan.  Pt critically ill. CC time 35min
Note written by attending, see above.  Time spent: 45min. More than 50% of the visit was spent counseling the patient on his condition - petechiae, thrombocytopenia, ITP.
Pt seen + examined. Case discussed with intern/resident. Agree with assessment and plan above (edited) with following addendum:  Time spent: 40min. More than 50% of the visit was spent counseling the patient/family on medical condition -- ITP, petechial rash, steroids.    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  -platelet count improving (up to 15,000 today); discussed with heme, if >20,000 tomorrow, pt can be discharged and f/up as outpt  -completed 2 days of IVIg and today is day 3/4 of pulse dose steroids  -suspicion by heme that Augmentin may have been the trigger for pt's ITP, though unclear -- discussed with pt that it may be wise to avoid penicillins if possible in the future, though we do not have definitive proof of the relation.  -f/up with outpt heme  -mucosal bleeding has resolved.
Pt seen + examined. Case discussed with intern/resident. Agree with assessment and plan above (edited) with following addendum:  Time spent: 40min. More than 50% of the visit was spent counseling the patient/family on medical condition -- refractory ITP, Rituxan, petechial rash, steroids.    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  - consider discharge when platelet count is > 20,000  - mucosal bleeding has resolved.  - discussed importance of fall precautions with the pt and his family and that he have someone next to him while ambulating.
Pt seen + examined. Case discussed with intern/resident. Agree with assessment and plan above (edited) with following addendum:  Time spent: 40min. More than 50% of the visit was spent counseling the patient/family on medical condition -- ITP, petechial rash, steroids.    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  -platelet count improving (but very slowly today); rechecked CBC in afternoon but still did not reach the goal heme had for safe discharge, which is >20,000 -- recheck CBC tomorrow, if >20K, pt can be discharged and f/up as outpt  -completed 2 days of IVIg and today is day 4/4 of pulse dose steroids  -suspicion by heme that Augmentin may have been the trigger for pt's ITP, though unclear -- discussed with pt that it may be wise to avoid penicillins if possible in the future, though we do not have definitive proof of the relation.  -f/up with outpt heme  -mucosal bleeding has resolved.
Pt seen + examined. Case discussed with intern/resident. Agree with assessment and plan above (edited) with following addendum:  Time spent: 40min. More than 50% of the visit was spent counseling the patient/family on medical condition -- ITP, petechial rash, steroids.    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   - completed 2 day course of IVIG; and 4 day course of pulse dose steroids, but now platelet count below 10K again, so perhaps refractory ITP  - had bone marrow bx today and will check acute hepatitis panel and consider starting Rituxan - f/up heme recs  - suspicion by heme that Augmentin may have been the trigger for pt's ITP, though unclear -- discussed with pt that it may be wise to avoid penicillins if possible in the future, though we do not have definitive proof of the relation.  - consider discharge when platelet count is > 20,000  - mucosal bleeding has resolved.
Pt seen + examined. Case discussed with intern/resident. Agree with assessment and plan above (edited) with following addendum:  Time spent: 40min. More than 50% of the visit was spent counseling the patient/family on medical condition -- refractory ITP, Rituxan, petechial rash, steroids.    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  - consider discharge when platelet count is > 20,000  - mucosal bleeding has resolved.  - discussed importance of fall precautions with the pt and his family and that he have someone next to him while ambulating.

## 2018-08-12 NOTE — PROGRESS NOTE ADULT - PROBLEM SELECTOR PLAN 2
- resolving, not currently bleeding.  - likely from small submucosal bleed in the mouth like a "blood blister"  - H/H currently stable, continue to monitor.  - Aspiration precautions - ensure bed is elevated above 30 degrees.
- acute  -likely from small submucosal bleed in the mouth like a "blood blister"  - Not currently bleeding.  - H/H currently stable, continue to monitor.  - Aspiration precautions - ensure bed is elevated above 30 degrees.
- acute, not currently bleeding.  - likely from small submucosal bleed in the mouth like a "blood blister"  - H/H currently stable, continue to monitor.  - Aspiration precautions - ensure bed is elevated above 30 degrees.
- new oral, non bleeding papule seen on physical exam  - previous oral ulcer on R side of inner lateral cheek now resolved  - H/H currently stable, continue to monitor.  - Aspiration precautions - ensure bed is elevated above 30 degrees.
- acute, not currently bleeding.  - likely from small submucosal bleed in the mouth like a "blood blister"  - H/H currently stable, continue to monitor.  - Aspiration precautions - ensure bed is elevated above 30 degrees.
no gag or corneal reflex  no response to pain  no pupillary response to light  control systolic blood pressure  hold any anticoagulants   not overbreathing ventilator  will need established goals of care
- resolving, not currently bleeding.  - likely from small submucosal bleed in the mouth like a "blood blister"  - H/H currently stable, continue to monitor.  - Aspiration precautions - ensure bed is elevated above 30 degrees.
- new oral lesion seen on PE, likely from small submucosal bleed in the mouth like a "blood blister"  - Old lesion resolving, not currently bleeding.  - H/H currently stable, continue to monitor.  - Aspiration precautions - ensure bed is elevated above 30 degrees.

## 2018-08-12 NOTE — PROGRESS NOTE ADULT - SUBJECTIVE AND OBJECTIVE BOX
24 hour events:   transferred to ICU overnight with catastrophic brainstem hemorrhage resulting in coma, acute respiratory failure  family requesting palliative care    Review of Systems: unable to obtain due to coma    T(F): 97.1 (08-12-18 @ 05:00), Max: 98.3 (08-11-18 @ 12:24)  HR: 59 (08-12-18 @ 08:00) (55 - 80)  BP: 128/75 (08-12-18 @ 08:00) (57/41 - 221/132)  RR: 20 (08-12-18 @ 08:00) (18 - 34)  SpO2: 100% (08-12-18 @ 08:00) (94% - 100%)  Wt(kg): --    Mode: AC/ CMV (Assist Control/ Continuous Mandatory Ventilation), RR (machine): 20, TV (machine): 500, FiO2: 50, PEEP: 5    CAPILLARY BLOOD GLUCOSE  149 (12 Aug 2018 04:38)      POCT Blood Glucose.: 149 mg/dL (12 Aug 2018 03:51)      I&O's Summary    11 Aug 2018 07:01  -  12 Aug 2018 07:00  --------------------------------------------------------  IN: 14.8 mL / OUT: 0 mL / NET: 14.8 mL        Physical Exam:   Gen: critically ill, intubated  Neuro: pupils fixed and dilated about 6mm, no response to facial pain, no gag, doll's eyes consistent with impaired brainstem, apenic for 60sec on vent  HEENT: slight oozing from R mouth  Resp: CTABL  CVS: RRR  Abd: soft, NTND  Ext: no edema  Skin: petechiae on lower legs b/l    Meds:          morphine  - Injectable IV Push                                            12.9   6.92  )-----------( 8        ( 11 Aug 2018 08:39 )             36.6       08-11    140  |  107  |  33<H>  ----------------------------<  142<H>  3.9   |  24  |  0.97    Ca    8.1<L>      11 Aug 2018 08:39          Radiology:   < from: CT Brain Stroke Protocol (08.12.18 @ 04:43) >  FINDINGS:      Diffuse brainstem hemorrhage centered in the pontomedullary junction with   intraventricular extension into the fourth and third ventricle is   evident. Mildly prominent ventricular size with temporal horn flaring   suggesting early signs of hydrocephalus. No midline shift.    Mild chronic hypervascular changes inthe periventricular deep cerebral   white matter is evident. Chronic bilateral lacunar type infarctions in   the gangliocapsular regions anteriorly. Atheromatous ossifications along   the carotid siphons are noted.    Paranasal sinuses and mastoid air cells are clear. Calvarium is intact.   Lens replacements.    IMPRESSION:     Diffuse brainstem hemorrhage centered in the pontomedullary junction with   intraventricular extension into the fourth and third ventricle. Early   signs of hydrocephalus with mild bilateral temporal horn flaring.    Findings were discussed with VIKAS Landry in the ICU at 4:40 AM on 8/12/2018   who indicated that the medication was understood. Patient's family is   present at bedside.    < end of copied text >      CENTRAL LINE: N  HINTON: N  A-LINE: N    GLOBAL ISSUE/BEST PRACTICE:  Analgesia: N  Sedation: N  CAM-ICU: JOCELIN  HOB elevation: yes  Stress ulcer prophylaxis: N  VTE prophylaxis: N  Glycemic control: Y  Nutrition: N    CODE STATUS: DNR

## 2018-08-21 LAB — CHROM ANALY OVERALL INTERP SPEC-IMP: SIGNIFICANT CHANGE UP

## 2018-08-23 LAB — CHROM ANALY INTERPHASE BLD FISH-IMP: SIGNIFICANT CHANGE UP

## 2018-08-24 ENCOUNTER — APPOINTMENT (OUTPATIENT)
Dept: ORTHOPEDIC SURGERY | Facility: CLINIC | Age: 83
End: 2018-08-24

## 2018-09-05 ENCOUNTER — APPOINTMENT (OUTPATIENT)
Dept: CARDIOLOGY | Facility: CLINIC | Age: 83
End: 2018-09-05

## 2018-10-24 PROCEDURE — 86850 RBC ANTIBODY SCREEN: CPT

## 2018-10-24 PROCEDURE — 83615 LACTATE (LD) (LDH) ENZYME: CPT

## 2018-10-24 PROCEDURE — 70450 CT HEAD/BRAIN W/O DYE: CPT

## 2018-10-24 PROCEDURE — 88280 CHROMOSOME KARYOTYPE STUDY: CPT

## 2018-10-24 PROCEDURE — 96375 TX/PRO/DX INJ NEW DRUG ADDON: CPT

## 2018-10-24 PROCEDURE — 88271 CYTOGENETICS DNA PROBE: CPT

## 2018-10-24 PROCEDURE — 82607 VITAMIN B-12: CPT

## 2018-10-24 PROCEDURE — 99285 EMERGENCY DEPT VISIT HI MDM: CPT | Mod: 25

## 2018-10-24 PROCEDURE — 82746 ASSAY OF FOLIC ACID SERUM: CPT

## 2018-10-24 PROCEDURE — 88185 FLOWCYTOMETRY/TC ADD-ON: CPT

## 2018-10-24 PROCEDURE — 82962 GLUCOSE BLOOD TEST: CPT

## 2018-10-24 PROCEDURE — 88264 CHROMOSOME ANALYSIS 20-25: CPT

## 2018-10-24 PROCEDURE — 86900 BLOOD TYPING SEROLOGIC ABO: CPT

## 2018-10-24 PROCEDURE — 85045 AUTOMATED RETICULOCYTE COUNT: CPT

## 2018-10-24 PROCEDURE — 96372 THER/PROPH/DIAG INJ SC/IM: CPT | Mod: XU

## 2018-10-24 PROCEDURE — 83036 HEMOGLOBIN GLYCOSYLATED A1C: CPT

## 2018-10-24 PROCEDURE — 80048 BASIC METABOLIC PNL TOTAL CA: CPT

## 2018-10-24 PROCEDURE — 88184 FLOWCYTOMETRY/ TC 1 MARKER: CPT

## 2018-10-24 PROCEDURE — 82803 BLOOD GASES ANY COMBINATION: CPT

## 2018-10-24 PROCEDURE — P9037: CPT

## 2018-10-24 PROCEDURE — 80074 ACUTE HEPATITIS PANEL: CPT

## 2018-10-24 PROCEDURE — 80076 HEPATIC FUNCTION PANEL: CPT

## 2018-10-24 PROCEDURE — 99221 1ST HOSP IP/OBS SF/LOW 40: CPT

## 2018-10-24 PROCEDURE — 96374 THER/PROPH/DIAG INJ IV PUSH: CPT

## 2018-10-24 PROCEDURE — 88237 TISSUE CULTURE BONE MARROW: CPT

## 2018-10-24 PROCEDURE — 99231 SBSQ HOSP IP/OBS SF/LOW 25: CPT

## 2018-10-24 PROCEDURE — 86901 BLOOD TYPING SEROLOGIC RH(D): CPT

## 2018-10-24 PROCEDURE — 88275 CYTOGENETICS 100-300: CPT

## 2018-10-24 PROCEDURE — 85027 COMPLETE CBC AUTOMATED: CPT

## 2018-10-24 PROCEDURE — 85610 PROTHROMBIN TIME: CPT

## 2018-10-24 PROCEDURE — 94760 N-INVAS EAR/PLS OXIMETRY 1: CPT

## 2018-10-24 PROCEDURE — 71045 X-RAY EXAM CHEST 1 VIEW: CPT

## 2018-10-24 PROCEDURE — 36600 WITHDRAWAL OF ARTERIAL BLOOD: CPT

## 2018-10-24 PROCEDURE — 96376 TX/PRO/DX INJ SAME DRUG ADON: CPT

## 2018-10-24 PROCEDURE — 80053 COMPREHEN METABOLIC PANEL: CPT

## 2018-10-24 PROCEDURE — 81001 URINALYSIS AUTO W/SCOPE: CPT

## 2018-10-24 PROCEDURE — 94002 VENT MGMT INPAT INIT DAY: CPT

## 2018-10-24 PROCEDURE — 93005 ELECTROCARDIOGRAM TRACING: CPT

## 2018-10-24 PROCEDURE — 36430 TRANSFUSION BLD/BLD COMPNT: CPT

## 2018-11-19 ENCOUNTER — APPOINTMENT (OUTPATIENT)
Dept: CARDIOLOGY | Facility: CLINIC | Age: 83
End: 2018-11-19

## 2019-09-03 NOTE — DISCHARGE NOTE FOR THE EXPIRED PATIENT - PROCEDURE 1
Patient requests all Lab and Radiology Results on their Discharge Instructions Bone marrow aspirate &biopsy

## 2019-11-13 NOTE — STROKE CODE NOTE - EVIDENCE OF INTRACRANIAL HEMORRHAGE ON PRETREATMENT CT SCAN (CT SCAN MUST BE READ BY ATTENDING RADIOLOGIST  OR ATTENDING NEUROLOGIST)
74 m with DM, HTN, CAD s/p CABG, previous L 2nd toe amp, developed R toe swelling and pain 9/2019 s/p partial amputation but never improved and for the last week had worsening edema and pain, an outside xray was s/o osteo, he was started on clinda and when the MRI showed osteo and pt did not improve, came to ER. He denied fever, chills, diarrhea   here afebrile, WBC normal, ESR: 28, CRP: 0.62  xray: Bony destruction and lucency at the base of the proximal phalanges of the second and third digits as well as the distal aspects of the second and third metatarsals consistent with osteomyelitis.    diabetic foot infection, R foot cellulitis and osteomyelitis (had partial 2nd toe amp 9/2019 with worsening R foot edema, erythema and pain)    * f/u the blood cultures  * c/w vanco 1 q 12 and check the trough before the 4th dose  * switch to zosyn 3.375 q 8 for now, will adjust as per the cultures  * plan for OR tomorrow, will follow the OR cultures Statement Selected

## 2019-11-24 NOTE — OCCUPATIONAL THERAPY INITIAL EVALUATION ADULT - THERAPY FREQUENCY, OT EVAL
Alert-The patient is alert, awake and responds to voice. The patient is oriented to time, place, and person. The triage nurse is able to obtain subjective information. 1-2x/week

## 2021-07-02 NOTE — OCCUPATIONAL THERAPY INITIAL EVALUATION ADULT - PHYSICAL ASSIST/NONPHYSICAL ASSIST: SIT/STAND, REHAB EVAL
S/P hernia repair.  Phone call for post op update.  VM message left requesting return call to clinic.  Hours and phone number provided.     1 person assist/verbal cues

## 2022-11-23 NOTE — H&P ADULT - PROBLEM SELECTOR PLAN 6
----- Message from Coco Cadena sent at 11/22/2022 11:10 AM CST -----  Contact: [phone]  Type:  RX Refill Request    Who Called:  Pt   Refill or New Rx:  refill  RX Name and Strength:  diclofenac sodium (VOLTAREN) 1 % Gel  How is the patient currently taking it? (ex. 1XDay):  -  Is this a 30 day or 90 day RX:  30  Preferred Pharmacy with phone number:    [facility] #[id number] - [place] - [address] [facility] [address] [place] [address] [address] [address]  Phone: [phone] Fax: [phone]  Local or Mail Order:  local   Ordering Provider:  Sherwin Roblero Call Back Number:  [phone]    Type:  RX Refill Request    Refill or New Rx:  Not on chart   RX Name and Strength:  Simvastatin (Zocor) 40mg  How is the patient currently taking it? (ex. 1XDay):  Na  Is this a 30 day or 90 day RX:  NA  Preferred Pharmacy with phone number:    [facility] #[id number] - [place] - [address] AT [facility] [address] [facility]  [id number] [place] [id number]  Phone: [phone] Fax: [phone]  Local or Mail Order:  Local   Ordering Provider:  KAROLINE         Addended by: HAYDEE AGUIRRE on: 6/4/2020 10:46 AM     Modules accepted: Orders     - Stable, continue home synthroid - chronic  - Stable, continue home synthroid

## 2023-05-23 NOTE — ED PROVIDER NOTE - CPE EDP SKIN NORM
RASH Ear Star Wedge Flap Text: The defect edges were debeveled with a #15 blade scalpel.  Given the location of the defect and the proximity to free margins (helical rim) an ear star wedge flap was deemed most appropriate.  Using a sterile surgical marker, the appropriate flap was drawn incorporating the defect and placing the expected incisions between the helical rim and antihelix where possible.  The area thus outlined was incised through and through with a #15 scalpel blade.

## 2024-11-14 NOTE — BRIEF OPERATIVE NOTE - PROCEDURE
2nd attempt- Left message for patient to return call.  Patient is due for a yearly wellness exam.  Upon return call, please schedule patient for wellness exam.      <<-----Click on this checkbox to enter Procedure Decompression of lumbar spine  03/13/2018  L3-S1  Active  ASATIN  Discectomy for herniated nucleus pulposus  03/13/2018  Left L4/5  Active  ASATIN

## 2024-11-26 NOTE — PROVIDER CONTACT NOTE (CRITICAL VALUE NOTIFICATION) - NS PROVIDER READ BACK
11/25/24 1313   Service Assessment   Patient Orientation Alert and Oriented   Cognition Alert   History Provided By Patient;Medical Record   Primary Caregiver Self   Support Systems Children;Family Members   Patient's Healthcare Decision Maker is: Legal Next of Kin   PCP Verified by CM Yes   Last Visit to PCP Within last 3 months   Prior Functional Level Assistance with the following:;Bathing;Dressing;Mobility   Current Functional Level Assistance with the following:;Bathing;Dressing;Mobility   Can patient return to prior living arrangement Yes   Ability to make needs known: Fair   Family able to assist with home care needs: Yes   Would you like for me to discuss the discharge plan with any other family members/significant others, and if so, who? Yes   Financial Resources Medicaid   Community Resources None   Social/Functional History   Lives With Family   Home Equipment Wheelchair - Manual;Rollator;Cane  (Shower chair)   Receives Help From Family   Ambulation Assistance Needs assistance        yes